# Patient Record
Sex: MALE | Race: WHITE | NOT HISPANIC OR LATINO | Employment: FULL TIME | URBAN - METROPOLITAN AREA
[De-identification: names, ages, dates, MRNs, and addresses within clinical notes are randomized per-mention and may not be internally consistent; named-entity substitution may affect disease eponyms.]

---

## 2017-02-15 ENCOUNTER — OFFICE VISIT (OUTPATIENT)
Dept: SURGERY | Facility: CLINIC | Age: 57
End: 2017-02-15
Payer: COMMERCIAL

## 2017-02-15 VITALS
BODY MASS INDEX: 30.39 KG/M2 | HEIGHT: 70 IN | WEIGHT: 212.31 LBS | HEART RATE: 65 BPM | SYSTOLIC BLOOD PRESSURE: 154 MMHG | RESPIRATION RATE: 18 BRPM | TEMPERATURE: 98 F | DIASTOLIC BLOOD PRESSURE: 89 MMHG

## 2017-02-15 DIAGNOSIS — C20 RECTAL CANCER: ICD-10-CM

## 2017-02-15 PROCEDURE — 99205 OFFICE O/P NEW HI 60 MIN: CPT | Mod: S$GLB,,, | Performed by: COLON & RECTAL SURGERY

## 2017-02-15 PROCEDURE — 99999 PR PBB SHADOW E&M-NEW PATIENT-LVL III: CPT | Mod: PBBFAC,,, | Performed by: COLON & RECTAL SURGERY

## 2017-02-15 RX ORDER — PRAVASTATIN SODIUM 80 MG/1
1 TABLET ORAL DAILY
COMMUNITY
Start: 2017-02-13

## 2017-02-15 RX ORDER — LEVOTHYROXINE SODIUM 50 UG/1
1 TABLET ORAL DAILY
COMMUNITY
Start: 2017-02-13

## 2017-02-15 NOTE — PROGRESS NOTES
Patient ID:  Murphy Angel is a 56 y.o. 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 Neg scans has seen XRT in [facility].   CT and MRI scheduled for tommorrow. Saw Dr Camacho who recommended APR. Here for second opinion.     ROS:        Constitutional: No fever, chills, activity or appetite change.      HENT: No hearing loss, facial swelling, neck pain or stiffness.       Eyes: No discharge, itching and visual disturbance.      Respiratory: No apnea, cough, choking or shortness of breath.       Cardiovascular: No leg swelling or chest pain      Gastrointestinal: No abdominal distention or change in bowel habbits     Genitourinary: No dysuria, frequency or flank pain.      Musculoskeletal: No arthralgias or gait problem.      Neurological: No dizziness, seizures or weakness.      Hematological: No adenopathy.      Psychiatric/Behavioral: No hallucinations or behavioral problems.       PE:    APPEARANCE: Well nourished, well developed, in no acute distress.   CHEST: Lungs clear. Normal respiratory effort.  CARDIOVASCULAR: Normal rhythm. No edema.  ABDOMEN: Soft. No tenderness or masses.  Rectum:  Normal skin, NST, 2.5 cm mass left posterior, not fixed distal edge 2 cm above puborectalis    Musculoskeletal: Symmetric, normal range of motion and strength.   Neurological: Alert and oriented to person, place, and time. Normal reflexes.   Skin: Skin is warm and dry.   Psychiatric: Normal mood and affect. Behavior is normal and appropriate.     Impression: Low rectal cancer  PLAN: Agree with neoadjuvant Chemoradiotherapy. Should be a canidate for sphincter preservation surgery (coloanal pullthrough with loop ileostomy) 6-10 weeks after Chemo XRT. Have patient return to clinic in 6 weeks.

## 2017-03-29 ENCOUNTER — OFFICE VISIT (OUTPATIENT)
Dept: SURGERY | Facility: CLINIC | Age: 57
End: 2017-03-29
Payer: COMMERCIAL

## 2017-03-29 VITALS
BODY MASS INDEX: 30.49 KG/M2 | DIASTOLIC BLOOD PRESSURE: 101 MMHG | HEART RATE: 72 BPM | SYSTOLIC BLOOD PRESSURE: 137 MMHG | WEIGHT: 212.5 LBS

## 2017-03-29 DIAGNOSIS — C20 RECTAL CANCER: Primary | ICD-10-CM

## 2017-03-29 PROCEDURE — 99999 PR PBB SHADOW E&M-EST. PATIENT-LVL III: CPT | Mod: PBBFAC,,, | Performed by: COLON & RECTAL SURGERY

## 2017-03-29 PROCEDURE — 99215 OFFICE O/P EST HI 40 MIN: CPT | Mod: S$GLB,,, | Performed by: COLON & RECTAL SURGERY

## 2017-03-29 PROCEDURE — 1160F RVW MEDS BY RX/DR IN RCRD: CPT | Mod: S$GLB,,, | Performed by: COLON & RECTAL SURGERY

## 2017-03-29 RX ORDER — METRONIDAZOLE 500 MG/100ML
500 INJECTION, SOLUTION INTRAVENOUS
Status: CANCELLED | OUTPATIENT
Start: 2017-03-29

## 2017-03-29 RX ORDER — CAPECITABINE 500 MG/1
TABLET, FILM COATED ORAL
COMMUNITY
Start: 2017-03-20 | End: 2018-04-18

## 2017-03-29 RX ORDER — ACETAMINOPHEN 10 MG/ML
1000 INJECTION, SOLUTION INTRAVENOUS
Status: CANCELLED | OUTPATIENT
Start: 2017-03-29 | End: 2017-03-29

## 2017-03-29 RX ORDER — HYDROCODONE BITARTRATE AND ACETAMINOPHEN 7.5; 325 MG/1; MG/1
1 TABLET ORAL
COMMUNITY
Start: 2017-03-13 | End: 2017-04-12

## 2017-03-29 RX ORDER — SODIUM CHLORIDE 9 MG/ML
INJECTION, SOLUTION INTRAVENOUS CONTINUOUS
Status: CANCELLED | OUTPATIENT
Start: 2017-03-29

## 2017-03-29 NOTE — PROGRESS NOTES
Patient ID:  Murphy Angel is a 56 y.o. 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 Neg scans has seen XRT in [facility].   CT 2/21/17 No evidence of mets mass not seen. MRI scheduled. Saw Dr Camacho who recommended APR. Came to [facility] for second opinion Feb 15, 2017.     ROS:        Constitutional: No fever, chills, activity or appetite change.      HENT: No hearing loss, facial swelling, neck pain or stiffness.       Eyes: No discharge, itching and visual disturbance.      Respiratory: No apnea, cough, choking or shortness of breath.       Cardiovascular: No leg swelling or chest pain      Gastrointestinal: No abdominal distention or change in bowel habbits     Genitourinary: No dysuria, frequency or flank pain.      Musculoskeletal: No arthralgias or gait problem.      Neurological: No dizziness, seizures or weakness.      Hematological: No adenopathy.      Psychiatric/Behavioral: No hallucinations or behavioral problems.       PE:    APPEARANCE: Well nourished, well developed, in no acute distress.   CHEST: Lungs clear. Normal respiratory effort.  CARDIOVASCULAR: Normal rhythm. No edema.  ABDOMEN: Soft. No tenderness or masses.  Rectum:  Normal skin, NST, 2 cm mass left posterior, not fixed distal edge 2 cm above puborectalis    Musculoskeletal: Symmetric, normal range of motion and strength.   Neurological: Alert and oriented to person, place, and time. Normal reflexes.   Skin: Skin is warm and dry.   Psychiatric: Normal mood and affect. Behavior is normal and appropriate.     Impression: Low rectal cancer  PLAN:   sphincter preservation surgery (LAR vs coloanal pullthrough with loop ileostomy)  On May 15 (case req in). RTC May 10 for exam.

## 2017-05-10 ENCOUNTER — OFFICE VISIT (OUTPATIENT)
Dept: SURGERY | Facility: CLINIC | Age: 57
End: 2017-05-10
Payer: COMMERCIAL

## 2017-05-10 VITALS
BODY MASS INDEX: 30.36 KG/M2 | HEART RATE: 58 BPM | SYSTOLIC BLOOD PRESSURE: 136 MMHG | DIASTOLIC BLOOD PRESSURE: 83 MMHG | HEIGHT: 70 IN | WEIGHT: 212.06 LBS

## 2017-05-10 DIAGNOSIS — C20 RECTAL CANCER: Primary | ICD-10-CM

## 2017-05-10 PROCEDURE — 99214 OFFICE O/P EST MOD 30 MIN: CPT | Mod: S$GLB,,, | Performed by: COLON & RECTAL SURGERY

## 2017-05-10 PROCEDURE — 99999 PR PBB SHADOW E&M-EST. PATIENT-LVL III: CPT | Mod: PBBFAC,,, | Performed by: COLON & RECTAL SURGERY

## 2017-05-10 PROCEDURE — 1160F RVW MEDS BY RX/DR IN RCRD: CPT | Mod: S$GLB,,, | Performed by: COLON & RECTAL SURGERY

## 2017-05-10 RX ORDER — METRONIDAZOLE 250 MG/1
TABLET ORAL
Qty: 3 TABLET | Refills: 0 | Status: ON HOLD | OUTPATIENT
Start: 2017-05-10 | End: 2017-06-23 | Stop reason: CLARIF

## 2017-05-10 RX ORDER — ACETAMINOPHEN 10 MG/ML
1000 INJECTION, SOLUTION INTRAVENOUS
Status: CANCELLED | OUTPATIENT
Start: 2017-05-10 | End: 2017-05-10

## 2017-05-10 RX ORDER — NEOMYCIN SULFATE 500 MG/1
1000 TABLET ORAL ONCE
Qty: 6 TABLET | Refills: 0 | Status: SHIPPED | OUTPATIENT
Start: 2017-05-10 | End: 2017-05-10

## 2017-05-10 RX ORDER — SODIUM CHLORIDE 9 MG/ML
INJECTION, SOLUTION INTRAVENOUS CONTINUOUS
Status: CANCELLED | OUTPATIENT
Start: 2017-05-10

## 2017-05-10 RX ORDER — METRONIDAZOLE 500 MG/100ML
500 INJECTION, SOLUTION INTRAVENOUS
Status: CANCELLED | OUTPATIENT
Start: 2017-05-10

## 2017-05-10 NOTE — H&P
Murphy Angel is a 56 y.o. 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 Neg scans has seen XRT in [facility].   CT 2/21/17 No evidence of mets mass not seen. MRI scheduled. Saw Dr Camacho who recommended APR. Came to [facility] for second opinion Feb 15, 2017.     ROS:        Constitutional: No fever, chills, activity or appetite change.      HENT: No hearing loss, facial swelling, neck pain or stiffness.       Eyes: No discharge, itching and visual disturbance.      Respiratory: No apnea, cough, choking or shortness of breath.       Cardiovascular: No leg swelling or chest pain      Gastrointestinal: No abdominal distention or change in bowel habbits     Genitourinary: No dysuria, frequency or flank pain.      Musculoskeletal: No arthralgias or gait problem.      Neurological: No dizziness, seizures or weakness.      Hematological: No adenopathy.      Psychiatric/Behavioral: No hallucinations or behavioral problems.       PE:    APPEARANCE: Well nourished, well developed, in no acute distress.   CHEST: Lungs clear. Normal respiratory effort.  CARDIOVASCULAR: Normal rhythm. No edema.  ABDOMEN: Soft. No tenderness or masses.  Rectum:  Normal skin, NST, 2 cm mass left posterior, not fixed distal edge 2 cm above puborectalis    Musculoskeletal: Symmetric, normal range of motion and strength.   Neurological: Alert and oriented to person, place, and time. Normal reflexes.   Skin: Skin is warm and dry.   Psychiatric: Normal mood and affect. Behavior is normal and appropriate.     Impression: Low rectal cancer  PLAN:   sphincter preservation surgery (LAR vs coloanal pullthrough with loop ileostomy)  On May 15.  I have explained the procedure including indications, alternatives, expected outcomes and potential complications. The patient  appears to understand and gives informed consent. The patient is medically ready for surgery.

## 2017-05-10 NOTE — PROGRESS NOTES
Patient ID:  Murphy Angel is a 56 y.o. 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 Neg scans has seen XRT in [facility].   CT 2/21/17 No evidence of mets mass not seen. MRI scheduled. Saw Dr Camacho who recommended APR. Came to [facility] for second opinion Feb 15, 2017.     ROS:        Constitutional: No fever, chills, activity or appetite change.      HENT: No hearing loss, facial swelling, neck pain or stiffness.       Eyes: No discharge, itching and visual disturbance.      Respiratory: No apnea, cough, choking or shortness of breath.       Cardiovascular: No leg swelling or chest pain      Gastrointestinal: No abdominal distention or change in bowel habbits     Genitourinary: No dysuria, frequency or flank pain.      Musculoskeletal: No arthralgias or gait problem.      Neurological: No dizziness, seizures or weakness.      Hematological: No adenopathy.      Psychiatric/Behavioral: No hallucinations or behavioral problems.       PE:    APPEARANCE: Well nourished, well developed, in no acute distress.   CHEST: Lungs clear. Normal respiratory effort.  CARDIOVASCULAR: Normal rhythm. No edema.  ABDOMEN: Soft. No tenderness or masses.  Rectum:  Normal skin, NST, 2 cm mass left posterior, not fixed distal edge 2 cm above puborectalis    Musculoskeletal: Symmetric, normal range of motion and strength.   Neurological: Alert and oriented to person, place, and time. Normal reflexes.   Skin: Skin is warm and dry.   Psychiatric: Normal mood and affect. Behavior is normal and appropriate.     Impression: Low rectal cancer  PLAN:   sphincter preservation surgery (LAR vs coloanal pullthrough with loop ileostomy)  On May 15.  I have explained the procedure including indications, alternatives, expected outcomes and potential complications.  The patient appears to understand and gives informed consent. The patient is medically ready for surgery.

## 2017-05-13 RX ORDER — NEOMYCIN SULFATE 500 MG/1
500 TABLET ORAL
Status: ON HOLD | COMMUNITY
End: 2017-06-23 | Stop reason: CLARIF

## 2017-05-13 NOTE — PRE-PROCEDURE INSTRUCTIONS
Spoke with Patient.  NPO, medication, and pre-op instructions reviewed.  Denies previous problems with Anesthesia.   Verbalized understanding of instructions.

## 2017-05-14 ENCOUNTER — ANESTHESIA EVENT (OUTPATIENT)
Dept: SURGERY | Facility: HOSPITAL | Age: 57
DRG: 331 | End: 2017-05-14
Payer: COMMERCIAL

## 2017-05-15 ENCOUNTER — ANESTHESIA (OUTPATIENT)
Dept: SURGERY | Facility: HOSPITAL | Age: 57
DRG: 331 | End: 2017-05-15
Payer: COMMERCIAL

## 2017-05-15 ENCOUNTER — HOSPITAL ENCOUNTER (INPATIENT)
Facility: HOSPITAL | Age: 57
LOS: 6 days | Discharge: HOME HEALTH | DRG: 331 | End: 2017-05-21
Attending: COLON & RECTAL SURGERY | Admitting: COLON & RECTAL SURGERY
Payer: COMMERCIAL

## 2017-05-15 ENCOUNTER — SURGERY (OUTPATIENT)
Age: 57
End: 2017-05-15

## 2017-05-15 DIAGNOSIS — C20 RECTAL CANCER: Primary | ICD-10-CM

## 2017-05-15 LAB
ABO + RH BLD: NORMAL
BLD GP AB SCN CELLS X3 SERPL QL: NORMAL

## 2017-05-15 PROCEDURE — 27000221 HC OXYGEN, UP TO 24 HOURS

## 2017-05-15 PROCEDURE — 36000708 HC OR TIME LEV III 1ST 15 MIN: Performed by: COLON & RECTAL SURGERY

## 2017-05-15 PROCEDURE — 88309 TISSUE EXAM BY PATHOLOGIST: CPT | Mod: 26,,, | Performed by: PATHOLOGY

## 2017-05-15 PROCEDURE — 25000003 PHARM REV CODE 250: Performed by: STUDENT IN AN ORGANIZED HEALTH CARE EDUCATION/TRAINING PROGRAM

## 2017-05-15 PROCEDURE — 86900 BLOOD TYPING SEROLOGIC ABO: CPT

## 2017-05-15 PROCEDURE — 25000003 PHARM REV CODE 250: Performed by: COLON & RECTAL SURGERY

## 2017-05-15 PROCEDURE — 63600175 PHARM REV CODE 636 W HCPCS: Performed by: COLON & RECTAL SURGERY

## 2017-05-15 PROCEDURE — 63600175 PHARM REV CODE 636 W HCPCS

## 2017-05-15 PROCEDURE — 63600175 PHARM REV CODE 636 W HCPCS: Performed by: ANESTHESIOLOGY

## 2017-05-15 PROCEDURE — D9220A PRA ANESTHESIA: Mod: ,,, | Performed by: ANESTHESIOLOGY

## 2017-05-15 PROCEDURE — 63600175 PHARM REV CODE 636 W HCPCS: Performed by: STUDENT IN AN ORGANIZED HEALTH CARE EDUCATION/TRAINING PROGRAM

## 2017-05-15 PROCEDURE — 71000039 HC RECOVERY, EACH ADD'L HOUR: Performed by: COLON & RECTAL SURGERY

## 2017-05-15 PROCEDURE — 88307 TISSUE EXAM BY PATHOLOGIST: CPT | Performed by: PATHOLOGY

## 2017-05-15 PROCEDURE — 37000008 HC ANESTHESIA 1ST 15 MINUTES: Performed by: COLON & RECTAL SURGERY

## 2017-05-15 PROCEDURE — 88307 TISSUE EXAM BY PATHOLOGIST: CPT | Mod: 26,,, | Performed by: PATHOLOGY

## 2017-05-15 PROCEDURE — 86901 BLOOD TYPING SEROLOGIC RH(D): CPT

## 2017-05-15 PROCEDURE — 71000033 HC RECOVERY, INTIAL HOUR: Performed by: COLON & RECTAL SURGERY

## 2017-05-15 PROCEDURE — 44139 MOBILIZATION OF COLON: CPT | Mod: ,,, | Performed by: COLON & RECTAL SURGERY

## 2017-05-15 PROCEDURE — 25000003 PHARM REV CODE 250

## 2017-05-15 PROCEDURE — 0DTP0ZZ RESECTION OF RECTUM, OPEN APPROACH: ICD-10-PCS | Performed by: COLON & RECTAL SURGERY

## 2017-05-15 PROCEDURE — 20600001 HC STEP DOWN PRIVATE ROOM

## 2017-05-15 PROCEDURE — C1729 CATH, DRAINAGE: HCPCS | Performed by: COLON & RECTAL SURGERY

## 2017-05-15 PROCEDURE — 27201423 OPTIME MED/SURG SUP & DEVICES STERILE SUPPLY: Performed by: COLON & RECTAL SURGERY

## 2017-05-15 PROCEDURE — 0D1B0Z4 BYPASS ILEUM TO CUTANEOUS, OPEN APPROACH: ICD-10-PCS | Performed by: COLON & RECTAL SURGERY

## 2017-05-15 PROCEDURE — 44145 PARTIAL REMOVAL OF COLON: CPT | Mod: ,,, | Performed by: COLON & RECTAL SURGERY

## 2017-05-15 PROCEDURE — 44310 ILEOSTOMY/JEJUNOSTOMY: CPT | Mod: 51,,, | Performed by: COLON & RECTAL SURGERY

## 2017-05-15 PROCEDURE — 0DTN0ZZ RESECTION OF SIGMOID COLON, OPEN APPROACH: ICD-10-PCS | Performed by: COLON & RECTAL SURGERY

## 2017-05-15 PROCEDURE — 94760 N-INVAS EAR/PLS OXIMETRY 1: CPT

## 2017-05-15 PROCEDURE — 36000709 HC OR TIME LEV III EA ADD 15 MIN: Performed by: COLON & RECTAL SURGERY

## 2017-05-15 PROCEDURE — 37000009 HC ANESTHESIA EA ADD 15 MINS: Performed by: COLON & RECTAL SURGERY

## 2017-05-15 RX ORDER — LEVOTHYROXINE SODIUM 25 UG/1
TABLET ORAL
Status: COMPLETED
Start: 2017-05-15 | End: 2017-05-15

## 2017-05-15 RX ORDER — SODIUM CHLORIDE 9 MG/ML
INJECTION, SOLUTION INTRAVENOUS CONTINUOUS
Status: DISCONTINUED | OUTPATIENT
Start: 2017-05-15 | End: 2017-05-15

## 2017-05-15 RX ORDER — NALOXONE HCL 0.4 MG/ML
0.02 VIAL (ML) INJECTION
Status: DISCONTINUED | OUTPATIENT
Start: 2017-05-15 | End: 2017-05-21 | Stop reason: HOSPADM

## 2017-05-15 RX ORDER — SODIUM CHLORIDE 9 MG/ML
INJECTION, SOLUTION INTRAVENOUS CONTINUOUS
Status: DISCONTINUED | OUTPATIENT
Start: 2017-05-15 | End: 2017-05-19

## 2017-05-15 RX ORDER — MIDAZOLAM HYDROCHLORIDE 1 MG/ML
INJECTION, SOLUTION INTRAMUSCULAR; INTRAVENOUS
Status: DISCONTINUED | OUTPATIENT
Start: 2017-05-15 | End: 2017-05-15

## 2017-05-15 RX ORDER — METRONIDAZOLE 500 MG/100ML
500 INJECTION, SOLUTION INTRAVENOUS
Status: COMPLETED | OUTPATIENT
Start: 2017-05-15 | End: 2017-05-15

## 2017-05-15 RX ORDER — HYDROMORPHONE HYDROCHLORIDE 1 MG/ML
0.2 INJECTION, SOLUTION INTRAMUSCULAR; INTRAVENOUS; SUBCUTANEOUS EVERY 5 MIN PRN
Status: DISCONTINUED | OUTPATIENT
Start: 2017-05-15 | End: 2017-05-15

## 2017-05-15 RX ORDER — SODIUM CHLORIDE 0.9 % (FLUSH) 0.9 %
3 SYRINGE (ML) INJECTION EVERY 8 HOURS
Status: DISCONTINUED | OUTPATIENT
Start: 2017-05-15 | End: 2017-05-15

## 2017-05-15 RX ORDER — IBUPROFEN 200 MG
16 TABLET ORAL
Status: DISCONTINUED | OUTPATIENT
Start: 2017-05-15 | End: 2017-05-21 | Stop reason: HOSPADM

## 2017-05-15 RX ORDER — ACETAMINOPHEN 10 MG/ML
INJECTION, SOLUTION INTRAVENOUS
Status: COMPLETED
Start: 2017-05-15 | End: 2017-05-15

## 2017-05-15 RX ORDER — KETAMINE HYDROCHLORIDE 100 MG/ML
INJECTION, SOLUTION INTRAMUSCULAR; INTRAVENOUS
Status: DISCONTINUED | OUTPATIENT
Start: 2017-05-15 | End: 2017-05-15

## 2017-05-15 RX ORDER — HYDROMORPHONE HYDROCHLORIDE 1 MG/ML
0.2 INJECTION, SOLUTION INTRAMUSCULAR; INTRAVENOUS; SUBCUTANEOUS EVERY 5 MIN PRN
Status: COMPLETED | OUTPATIENT
Start: 2017-05-15 | End: 2017-05-15

## 2017-05-15 RX ORDER — ONDANSETRON 2 MG/ML
4 INJECTION INTRAMUSCULAR; INTRAVENOUS EVERY 4 HOURS PRN
Status: DISCONTINUED | OUTPATIENT
Start: 2017-05-15 | End: 2017-05-21 | Stop reason: HOSPADM

## 2017-05-15 RX ORDER — ACETAMINOPHEN 10 MG/ML
1000 INJECTION, SOLUTION INTRAVENOUS
Status: COMPLETED | OUTPATIENT
Start: 2017-05-15 | End: 2017-05-15

## 2017-05-15 RX ORDER — PROPOFOL 10 MG/ML
VIAL (ML) INTRAVENOUS
Status: DISCONTINUED | OUTPATIENT
Start: 2017-05-15 | End: 2017-05-15

## 2017-05-15 RX ORDER — GLYCOPYRROLATE 0.2 MG/ML
INJECTION INTRAMUSCULAR; INTRAVENOUS
Status: DISCONTINUED | OUTPATIENT
Start: 2017-05-15 | End: 2017-05-15

## 2017-05-15 RX ORDER — HYDROMORPHONE HYDROCHLORIDE 1 MG/ML
0.5 INJECTION, SOLUTION INTRAMUSCULAR; INTRAVENOUS; SUBCUTANEOUS EVERY 5 MIN PRN
Status: COMPLETED | OUTPATIENT
Start: 2017-05-15 | End: 2017-05-15

## 2017-05-15 RX ORDER — SODIUM CHLORIDE 0.9 % (FLUSH) 0.9 %
3 SYRINGE (ML) INJECTION
Status: DISCONTINUED | OUTPATIENT
Start: 2017-05-15 | End: 2017-05-15

## 2017-05-15 RX ORDER — DIPHENHYDRAMINE HYDROCHLORIDE 50 MG/ML
12.5 INJECTION INTRAMUSCULAR; INTRAVENOUS EVERY 4 HOURS PRN
Status: DISCONTINUED | OUTPATIENT
Start: 2017-05-15 | End: 2017-05-21 | Stop reason: HOSPADM

## 2017-05-15 RX ORDER — ACETAMINOPHEN 10 MG/ML
1000 INJECTION, SOLUTION INTRAVENOUS EVERY 8 HOURS
Status: DISPENSED | OUTPATIENT
Start: 2017-05-15 | End: 2017-05-16

## 2017-05-15 RX ORDER — PRAVASTATIN SODIUM 40 MG/1
80 TABLET ORAL DAILY
Status: DISCONTINUED | OUTPATIENT
Start: 2017-05-15 | End: 2017-05-21 | Stop reason: HOSPADM

## 2017-05-15 RX ORDER — HYDROMORPHONE HYDROCHLORIDE 2 MG/ML
INJECTION, SOLUTION INTRAMUSCULAR; INTRAVENOUS; SUBCUTANEOUS
Status: DISCONTINUED | OUTPATIENT
Start: 2017-05-15 | End: 2017-05-15

## 2017-05-15 RX ORDER — HYDROMORPHONE HYDROCHLORIDE 1 MG/ML
INJECTION, SOLUTION INTRAMUSCULAR; INTRAVENOUS; SUBCUTANEOUS
Status: COMPLETED
Start: 2017-05-15 | End: 2017-05-15

## 2017-05-15 RX ORDER — LIDOCAINE HCL/PF 100 MG/5ML
SYRINGE (ML) INTRAVENOUS
Status: DISCONTINUED | OUTPATIENT
Start: 2017-05-15 | End: 2017-05-15

## 2017-05-15 RX ORDER — PRAVASTATIN SODIUM 20 MG/1
TABLET ORAL
Status: COMPLETED
Start: 2017-05-15 | End: 2017-05-15

## 2017-05-15 RX ORDER — FENTANYL CITRATE 50 UG/ML
INJECTION, SOLUTION INTRAMUSCULAR; INTRAVENOUS
Status: DISCONTINUED | OUTPATIENT
Start: 2017-05-15 | End: 2017-05-15

## 2017-05-15 RX ORDER — HYDROMORPHONE HYDROCHLORIDE 1 MG/ML
1 INJECTION, SOLUTION INTRAMUSCULAR; INTRAVENOUS; SUBCUTANEOUS
Status: DISCONTINUED | OUTPATIENT
Start: 2017-05-15 | End: 2017-05-19

## 2017-05-15 RX ORDER — DEXAMETHASONE SODIUM PHOSPHATE 4 MG/ML
INJECTION, SOLUTION INTRA-ARTICULAR; INTRALESIONAL; INTRAMUSCULAR; INTRAVENOUS; SOFT TISSUE
Status: DISCONTINUED | OUTPATIENT
Start: 2017-05-15 | End: 2017-05-15

## 2017-05-15 RX ORDER — ENOXAPARIN SODIUM 100 MG/ML
40 INJECTION SUBCUTANEOUS EVERY 24 HOURS
Status: DISCONTINUED | OUTPATIENT
Start: 2017-05-16 | End: 2017-05-21 | Stop reason: HOSPADM

## 2017-05-15 RX ORDER — NEOSTIGMINE METHYLSULFATE 1 MG/ML
INJECTION, SOLUTION INTRAVENOUS
Status: DISCONTINUED | OUTPATIENT
Start: 2017-05-15 | End: 2017-05-15

## 2017-05-15 RX ORDER — ONDANSETRON 2 MG/ML
4 INJECTION INTRAMUSCULAR; INTRAVENOUS DAILY PRN
Status: DISCONTINUED | OUTPATIENT
Start: 2017-05-15 | End: 2017-05-15

## 2017-05-15 RX ORDER — LIDOCAINE HYDROCHLORIDE AND EPINEPHRINE 10; 10 MG/ML; UG/ML
INJECTION, SOLUTION INFILTRATION; PERINEURAL
Status: DISCONTINUED | OUTPATIENT
Start: 2017-05-15 | End: 2017-05-15 | Stop reason: HOSPADM

## 2017-05-15 RX ORDER — ONDANSETRON 2 MG/ML
INJECTION INTRAMUSCULAR; INTRAVENOUS
Status: DISCONTINUED | OUTPATIENT
Start: 2017-05-15 | End: 2017-05-15

## 2017-05-15 RX ORDER — ONDANSETRON 2 MG/ML
INJECTION INTRAMUSCULAR; INTRAVENOUS
Status: DISPENSED
Start: 2017-05-15 | End: 2017-05-15

## 2017-05-15 RX ORDER — LEVOTHYROXINE SODIUM 50 UG/1
50 TABLET ORAL DAILY
Status: DISCONTINUED | OUTPATIENT
Start: 2017-05-15 | End: 2017-05-21 | Stop reason: HOSPADM

## 2017-05-15 RX ORDER — ROCURONIUM BROMIDE 10 MG/ML
INJECTION, SOLUTION INTRAVENOUS
Status: DISCONTINUED | OUTPATIENT
Start: 2017-05-15 | End: 2017-05-15

## 2017-05-15 RX ORDER — IBUPROFEN 200 MG
24 TABLET ORAL
Status: DISCONTINUED | OUTPATIENT
Start: 2017-05-15 | End: 2017-05-21 | Stop reason: HOSPADM

## 2017-05-15 RX ORDER — HYDROMORPHONE HYDROCHLORIDE 1 MG/ML
0.5 INJECTION, SOLUTION INTRAMUSCULAR; INTRAVENOUS; SUBCUTANEOUS
Status: DISCONTINUED | OUTPATIENT
Start: 2017-05-15 | End: 2017-05-19

## 2017-05-15 RX ORDER — BUPIVACAINE HYDROCHLORIDE 2.5 MG/ML
INJECTION, SOLUTION EPIDURAL; INFILTRATION; INTRACAUDAL
Status: DISCONTINUED | OUTPATIENT
Start: 2017-05-15 | End: 2017-05-15 | Stop reason: HOSPADM

## 2017-05-15 RX ORDER — GLUCAGON 1 MG
1 KIT INJECTION
Status: DISCONTINUED | OUTPATIENT
Start: 2017-05-15 | End: 2017-05-21 | Stop reason: HOSPADM

## 2017-05-15 RX ADMIN — IBUPROFEN 800 MG: 800 INJECTION INTRAVENOUS at 12:05

## 2017-05-15 RX ADMIN — ONDANSETRON 4 MG: 2 INJECTION INTRAMUSCULAR; INTRAVENOUS at 07:05

## 2017-05-15 RX ADMIN — SODIUM CHLORIDE, SODIUM GLUCONATE, SODIUM ACETATE, POTASSIUM CHLORIDE, MAGNESIUM CHLORIDE, SODIUM PHOSPHATE, DIBASIC, AND POTASSIUM PHOSPHATE: .53; .5; .37; .037; .03; .012; .00082 INJECTION, SOLUTION INTRAVENOUS at 09:05

## 2017-05-15 RX ADMIN — ROCURONIUM BROMIDE 10 MG: 10 INJECTION, SOLUTION INTRAVENOUS at 10:05

## 2017-05-15 RX ADMIN — ROCURONIUM BROMIDE 10 MG: 10 INJECTION, SOLUTION INTRAVENOUS at 09:05

## 2017-05-15 RX ADMIN — METRONIDAZOLE 500 MG: 500 SOLUTION INTRAVENOUS at 07:05

## 2017-05-15 RX ADMIN — HYDROMORPHONE HYDROCHLORIDE 1 MG: 1 INJECTION, SOLUTION INTRAMUSCULAR; INTRAVENOUS; SUBCUTANEOUS at 09:05

## 2017-05-15 RX ADMIN — HYDROMORPHONE HYDROCHLORIDE 0.2 MG: 1 INJECTION, SOLUTION INTRAMUSCULAR; INTRAVENOUS; SUBCUTANEOUS at 12:05

## 2017-05-15 RX ADMIN — SODIUM CHLORIDE: 0.9 INJECTION, SOLUTION INTRAVENOUS at 12:05

## 2017-05-15 RX ADMIN — HYDROMORPHONE HYDROCHLORIDE 0.5 MG: 1 INJECTION, SOLUTION INTRAMUSCULAR; INTRAVENOUS; SUBCUTANEOUS at 03:05

## 2017-05-15 RX ADMIN — HYDROMORPHONE HYDROCHLORIDE 0.2 MG: 1 INJECTION, SOLUTION INTRAMUSCULAR; INTRAVENOUS; SUBCUTANEOUS at 01:05

## 2017-05-15 RX ADMIN — ACETAMINOPHEN 1000 MG: 10 INJECTION, SOLUTION INTRAVENOUS at 01:05

## 2017-05-15 RX ADMIN — KETAMINE HYDROCHLORIDE 5 MG: 100 INJECTION, SOLUTION, CONCENTRATE INTRAMUSCULAR; INTRAVENOUS at 10:05

## 2017-05-15 RX ADMIN — SODIUM CHLORIDE, SODIUM GLUCONATE, SODIUM ACETATE, POTASSIUM CHLORIDE, MAGNESIUM CHLORIDE, SODIUM PHOSPHATE, DIBASIC, AND POTASSIUM PHOSPHATE: .53; .5; .37; .037; .03; .012; .00082 INJECTION, SOLUTION INTRAVENOUS at 07:05

## 2017-05-15 RX ADMIN — ROCURONIUM BROMIDE 10 MG: 10 INJECTION, SOLUTION INTRAVENOUS at 07:05

## 2017-05-15 RX ADMIN — PRAVASTATIN SODIUM 80 MG: 40 TABLET ORAL at 12:05

## 2017-05-15 RX ADMIN — LIDOCAINE HYDROCHLORIDE AND EPINEPHRINE 20 ML: 10; 10 INJECTION, SOLUTION INFILTRATION; PERINEURAL at 08:05

## 2017-05-15 RX ADMIN — PRAVASTATIN SODIUM 80 MG: 20 TABLET ORAL at 12:05

## 2017-05-15 RX ADMIN — GLYCOPYRROLATE 0.4 MG: 0.2 INJECTION, SOLUTION INTRAMUSCULAR; INTRAVENOUS at 11:05

## 2017-05-15 RX ADMIN — IBUPROFEN 800 MG: 800 INJECTION INTRAVENOUS at 05:05

## 2017-05-15 RX ADMIN — BUPIVACAINE 20 ML: 13.3 INJECTION, SUSPENSION, LIPOSOMAL INFILTRATION at 11:05

## 2017-05-15 RX ADMIN — FENTANYL CITRATE 25 MCG: 50 INJECTION, SOLUTION INTRAMUSCULAR; INTRAVENOUS at 08:05

## 2017-05-15 RX ADMIN — MIDAZOLAM HYDROCHLORIDE 2 MG: 1 INJECTION, SOLUTION INTRAMUSCULAR; INTRAVENOUS at 07:05

## 2017-05-15 RX ADMIN — ACETAMINOPHEN 1000 MG: 10 INJECTION, SOLUTION INTRAVENOUS at 09:05

## 2017-05-15 RX ADMIN — HYDROMORPHONE HYDROCHLORIDE 1 MG: 1 INJECTION, SOLUTION INTRAMUSCULAR; INTRAVENOUS; SUBCUTANEOUS at 05:05

## 2017-05-15 RX ADMIN — HYDROMORPHONE HYDROCHLORIDE 0.4 MG: 2 INJECTION, SOLUTION INTRAMUSCULAR; INTRAVENOUS; SUBCUTANEOUS at 09:05

## 2017-05-15 RX ADMIN — ROCURONIUM BROMIDE 10 MG: 10 INJECTION, SOLUTION INTRAVENOUS at 08:05

## 2017-05-15 RX ADMIN — HYDROMORPHONE HYDROCHLORIDE 0.5 MG: 1 INJECTION, SOLUTION INTRAMUSCULAR; INTRAVENOUS; SUBCUTANEOUS at 02:05

## 2017-05-15 RX ADMIN — HYDROMORPHONE HYDROCHLORIDE 0.2 MG: 2 INJECTION, SOLUTION INTRAMUSCULAR; INTRAVENOUS; SUBCUTANEOUS at 08:05

## 2017-05-15 RX ADMIN — PROPOFOL 150 MG: 10 INJECTION, EMULSION INTRAVENOUS at 07:05

## 2017-05-15 RX ADMIN — ONDANSETRON 4 MG: 2 INJECTION INTRAMUSCULAR; INTRAVENOUS at 11:05

## 2017-05-15 RX ADMIN — ROCURONIUM BROMIDE 50 MG: 10 INJECTION, SOLUTION INTRAVENOUS at 07:05

## 2017-05-15 RX ADMIN — HYDROMORPHONE HYDROCHLORIDE 0.2 MG: 2 INJECTION, SOLUTION INTRAMUSCULAR; INTRAVENOUS; SUBCUTANEOUS at 10:05

## 2017-05-15 RX ADMIN — FENTANYL CITRATE 50 MCG: 50 INJECTION, SOLUTION INTRAMUSCULAR; INTRAVENOUS at 07:05

## 2017-05-15 RX ADMIN — KETAMINE HYDROCHLORIDE 30 MG: 100 INJECTION, SOLUTION, CONCENTRATE INTRAMUSCULAR; INTRAVENOUS at 07:05

## 2017-05-15 RX ADMIN — FENTANYL CITRATE 25 MCG: 50 INJECTION, SOLUTION INTRAMUSCULAR; INTRAVENOUS at 07:05

## 2017-05-15 RX ADMIN — ONDANSETRON 4 MG: 2 INJECTION INTRAMUSCULAR; INTRAVENOUS at 10:05

## 2017-05-15 RX ADMIN — HYDROMORPHONE HYDROCHLORIDE 0.5 MG: 1 INJECTION, SOLUTION INTRAMUSCULAR; INTRAVENOUS; SUBCUTANEOUS at 01:05

## 2017-05-15 RX ADMIN — SODIUM CHLORIDE, SODIUM GLUCONATE, SODIUM ACETATE, POTASSIUM CHLORIDE, MAGNESIUM CHLORIDE, SODIUM PHOSPHATE, DIBASIC, AND POTASSIUM PHOSPHATE: .53; .5; .37; .037; .03; .012; .00082 INJECTION, SOLUTION INTRAVENOUS at 08:05

## 2017-05-15 RX ADMIN — LEVOTHYROXINE SODIUM 50 MCG: 25 TABLET ORAL at 12:05

## 2017-05-15 RX ADMIN — DEXAMETHASONE SODIUM PHOSPHATE 8 MG: 4 INJECTION, SOLUTION INTRAMUSCULAR; INTRAVENOUS at 07:05

## 2017-05-15 RX ADMIN — KETAMINE HYDROCHLORIDE 5 MG: 100 INJECTION, SOLUTION, CONCENTRATE INTRAMUSCULAR; INTRAVENOUS at 08:05

## 2017-05-15 RX ADMIN — ACETAMINOPHEN 1000 MG: 10 INJECTION, SOLUTION INTRAVENOUS at 06:05

## 2017-05-15 RX ADMIN — LIDOCAINE HYDROCHLORIDE 100 MG: 20 INJECTION, SOLUTION INTRAVENOUS at 07:05

## 2017-05-15 RX ADMIN — NEOSTIGMINE METHYLSULFATE 5 MG: 1 INJECTION INTRAVENOUS at 11:05

## 2017-05-15 RX ADMIN — SODIUM CHLORIDE: 0.9 INJECTION, SOLUTION INTRAVENOUS at 06:05

## 2017-05-15 RX ADMIN — CEFTRIAXONE 2 G: 2 INJECTION, SOLUTION INTRAVENOUS at 07:05

## 2017-05-15 RX ADMIN — SODIUM CHLORIDE, SODIUM GLUCONATE, SODIUM ACETATE, POTASSIUM CHLORIDE, MAGNESIUM CHLORIDE, SODIUM PHOSPHATE, DIBASIC, AND POTASSIUM PHOSPHATE: .53; .5; .37; .037; .03; .012; .00082 INJECTION, SOLUTION INTRAVENOUS at 10:05

## 2017-05-15 RX ADMIN — FENTANYL CITRATE 100 MCG: 50 INJECTION, SOLUTION INTRAMUSCULAR; INTRAVENOUS at 07:05

## 2017-05-15 RX ADMIN — SODIUM CHLORIDE: 0.9 INJECTION, SOLUTION INTRAVENOUS at 07:05

## 2017-05-15 RX ADMIN — LEVOTHYROXINE SODIUM 50 MCG: 50 TABLET ORAL at 12:05

## 2017-05-15 RX ADMIN — BUPIVACAINE HYDROCHLORIDE 30 ML: 2.5 INJECTION, SOLUTION EPIDURAL; INFILTRATION; INTRACAUDAL; PERINEURAL at 11:05

## 2017-05-15 NOTE — ANESTHESIA RELEASE NOTE
"Anesthesia Release from PACU Note    Patient: Murphy Angel    Procedure(s) Performed: Procedure(s) (LRB):  RESECTION-COLON AND RECTAL-LOW ANTERIOR, coloanal pullthrough, loop ileostomy, double setup (N/A)    Anesthesia type: general    Post pain: Adequate analgesia    Post assessment: no apparent anesthetic complications, tolerated procedure well and no evidence of recall    Last Vitals:   Visit Vitals    BP (!) 151/86    Pulse 89    Temp 36.5 °C (97.7 °F) (Temporal)    Resp 10    Ht 5' 10" (1.778 m)    Wt 96.2 kg (212 lb)    SpO2 100%    BMI 30.42 kg/m2       Post vital signs: stable    Level of consciousness: awake, alert  and oriented    Nausea/Vomiting: no nausea/no vomiting    Complications: none    Airway Patency: patent    Respiratory: unassisted, spontaneous ventilation, room air    Cardiovascular: stable and blood pressure at baseline    Hydration: euvolemic  "

## 2017-05-15 NOTE — ANESTHESIA POSTPROCEDURE EVALUATION
"Anesthesia Post Evaluation    Patient: Murphy Angel    Procedure(s) Performed: Procedure(s) (LRB):  RESECTION-COLON AND RECTAL-LOW ANTERIOR, coloanal pullthrough, loop ileostomy, double setup (N/A)    Final Anesthesia Type: general  Patient location during evaluation: PACU  Patient participation: Yes- Able to Participate  Level of consciousness: awake and alert and oriented  Post-procedure vital signs: reviewed and stable  Pain management: adequate  Airway patency: patent  PONV status at discharge: No PONV  Anesthetic complications: no      Cardiovascular status: blood pressure returned to baseline and hemodynamically stable  Respiratory status: spontaneous ventilation, unassisted and room air  Hydration status: euvolemic  Follow-up not needed.        Visit Vitals    BP (!) 151/86    Pulse 89    Temp 37 °C (98.6 °F) (Temporal)    Resp 10    Ht 5' 10" (1.778 m)    Wt 96.2 kg (212 lb)    SpO2 100%    BMI 30.42 kg/m2       Pain/Conrad Score: Pain Assessment Performed: Yes (5/15/2017  2:15 PM)  Presence of Pain: complains of pain/discomfort (5/15/2017  2:15 PM)  Pain Rating Prior to Med Admin: 5 (5/15/2017  3:15 PM)  Conrad Score: 10 (5/15/2017  2:15 PM)      "

## 2017-05-15 NOTE — BRIEF OP NOTE
[facility]  Colon and Rectal Surgery  Operative Note    SUMMARY     Date of Procedure: 5/15/2017     Procedure:  RESECTION-COLON AND RECTAL-LOW ANTERIOR, coloanal pullthrough, loop ileostomy     Surgeon(s) and Role:     * Nacho Dominguez MD - Primary     * Freddy Rod MD - Fellow    Assisting Surgeon: None    Pre-Operative Diagnosis: Rectal cancer [C20]    Post-Operative Diagnosis: Post-Op Diagnosis Codes:     * Rectal cancer [C20]    Anesthesia: General, Exparel 266 mg, Marcaine 0.25% (75 mg) saline 20 cc  preperironeal injection      Technical Procedures Used: hand sewn end coloanall pullthrough, loop ileostomy  Description of the Findings of the Procedure: distal scar just above anorectal ring    Significant Surgical Tasks Conducted by the Assistant(s), if Applicable: n/a    Complications: No    Estimated Blood Loss (EBL): 300 mL           Implants: * No implants in log *    Specimens:   Specimen (12h ago through future)    Start     Ordered    05/15/17 1108  Specimen to Pathology - Surgery  Once     Comments:  1. Rectum and anal canal (permanent, no preservatives, to fridge)   2. Anal canal (permanent, in formalin, to fridge)    05/15/17 1107           Condition: Good    Disposition: PACU - hemodynamically stable.    Attestation: I was present and scrubbed for the entire procedure.

## 2017-05-15 NOTE — INTERVAL H&P NOTE
The patient has been examined and the H&P has been reviewed:    I concur with the findings and no changes have occurred since H&P was written.    Anesthesia/Surgery risks, benefits and alternative options discussed and understood by patient/family.          Active Hospital Problems    Diagnosis  POA    Rectal cancer [C20]  Yes      Resolved Hospital Problems    Diagnosis Date Resolved POA   No resolved problems to display.

## 2017-05-15 NOTE — ANESTHESIA PREPROCEDURE EVALUATION
Pre-operative evaluation for Procedure(s) (LRB):  RESECTION-COLON AND RECTAL-LOW ANTERIOR, coloanal pullthrough, loop ileostomy, double setup (N/A)    Murphy Angel is a 56 y.o. male with pmh of smoking, hypothyroidism and rectal cancer. Plan for above procedure 5/15/17.     LDA:   18G PIV    Prev airway:   None on file     Patient Active Problem List   Diagnosis    Rectal cancer       Review of patient's allergies indicates:  No Known Allergies     No current facility-administered medications on file prior to encounter.      Current Outpatient Prescriptions on File Prior to Encounter   Medication Sig Dispense Refill    levothyroxine (SYNTHROID) 50 MCG tablet Take 1 tablet by mouth once daily.      pravastatin (PRAVACHOL) 80 MG tablet Take 1 tablet by mouth once daily.      capecitabine (XELODA) 500 MG Tab          Past Surgical History:   Procedure Laterality Date    EYE SURGERY         Social History     Social History    Marital status:      Spouse name: N/A    Number of children: N/A    Years of education: N/A     Occupational History    Not on file.     Social History Main Topics    Smoking status: Current Every Day Smoker     Packs/day: 1.00     Types: Cigarettes    Smokeless tobacco: Never Used    Alcohol use No    Drug use: No    Sexual activity: Not on file     Other Topics Concern    Not on file     Social History Narrative         Vital Signs Range (Last 24H):  Temp:  [36.7 °C (98.1 °F)]   Pulse:  [63]   Resp:  [16]   BP: (127)/(77)   SpO2:  [99 %]       CBC: No results for input(s): WBC, RBC, HGB, HCT, PLT, MCV, MCH, MCHC in the last 72 hours.    CMP: No results for input(s): NA, K, CL, CO2, BUN, CREATININE, GLU, MG, PHOS, CALCIUM, ALBUMIN, PROT, ALKPHOS, ALT, AST, BILITOT in the last 72 hours.    INR  No results for input(s): INR, PROTIME, APTT in the last 72 hours.    Invalid input(s): PT    EKG:  Vent. Rate : 076 BPM     Atrial Rate : 076 BPM     P-R Int : 146 ms           QRS Dur : 098 ms      QT Int : 396 ms       P-R-T Axes : 050 061 030 degrees     QTc Int : 445 ms    Confirmed by STORAGE ONLY, NO REPORT ([id number]),  ISH JEAN ([id number])  on 3/8/2013 10:48:34 AM    Anesthesia Evaluation    I have reviewed the Patient Summary Reports.        Review of Systems  Anesthesia Hx:  No problems with previous Anesthesia  History of prior surgery of interest to airway management or planning: Denies Family Hx of Anesthesia complications.   Denies Personal Hx of Anesthesia complications.   Social:  Smoker 1 ppd   Hematology/Oncology:  Hematology Normal      Current/Recent Cancer. (Rectal Cancer)   EENT/Dental:EENT/Dental Normal   Cardiovascular:  Cardiovascular Normal     Pulmonary:  Pulmonary Normal    Renal/:  Renal/ Normal     Hepatic/GI:  Hepatic/GI Normal    Musculoskeletal:  Musculoskeletal Normal    Neurological:  Neurology Normal    Endocrine:  Endocrine Normal    Dermatological:  Skin Normal    Psych:  Psychiatric Normal           Physical Exam  General:  Well nourished    Airway/Jaw/Neck:  Airway Findings: Mouth Opening: Normal Tongue: Normal  General Airway Assessment: Adult  Mallampati: III  Improves to II with phonation.  TM Distance: Normal, at least 6 cm      Dental:  Dental Findings: In tact   Chest/Lungs:  Chest/Lungs Findings: Clear to auscultation, Normal Respiratory Rate     Heart/Vascular:  Heart Findings: Rate: Normal  Rhythm: Regular Rhythm  Sounds: Normal  Heart murmur: negative    Abdomen:  Abdomen Findings: Normal      Mental Status:  Mental Status Findings:  Cooperative, Alert and Oriented         Anesthesia Plan  Type of Anesthesia, risks & benefits discussed:  Anesthesia Type:  general  Patient's Preference:   Intra-op Monitoring Plan: standard ASA monitors  Intra-op Monitoring Plan Comments:   Post Op Pain Control Plan:   Post Op Pain Control Plan Comments:   Induction:   IV  Beta Blocker:  Patient is not currently on a Beta-Blocker (No further  documentation required).       Informed Consent: Patient understands risks and agrees with Anesthesia plan.  Questions answered. Anesthesia consent signed with patient.  ASA Score: 2     Day of Surgery Review of History & Physical: I have interviewed and examined the patient. I have reviewed the patient's H&P dated:            Ready For Surgery From Anesthesia Perspective.

## 2017-05-15 NOTE — NURSING TRANSFER
Nursing Transfer Note      5/15/2017     Transfer To: [address]    Transfer via stretcher    Transfer with iv pole    Transported by pct    Medicines sent: iv fluids    Chart send with patient: Yes    Notified: spouse    Patient reassessed at: 5/15/17

## 2017-05-15 NOTE — TRANSFER OF CARE
"Anesthesia Transfer of Care Note    Patient: Murphy Angel    Procedure(s) Performed: Procedure(s) (LRB):  RESECTION-COLON AND RECTAL-LOW ANTERIOR, coloanal pullthrough, loop ileostomy, double setup (N/A)    Patient location: PACU    Anesthesia Type: general    Transport from OR: Transported from OR on 6-10 L/min O2 by face mask with adequate spontaneous ventilation    Post pain: adequate analgesia    Post assessment: no apparent anesthetic complications and tolerated procedure well    Post vital signs: stable    Level of consciousness: awake and alert    Nausea/Vomiting: no nausea/vomiting    Complications: none          Last vitals:   Visit Vitals    BP (!) 120/58 (BP Location: Left arm, Patient Position: Lying, BP Method: Automatic)    Pulse 80    Temp 36.7 °C (98.1 °F) (Oral)    Resp 10    Ht 5' 10" (1.778 m)    Wt 96.2 kg (212 lb)    SpO2 100%    BMI 30.42 kg/m2     "

## 2017-05-15 NOTE — PLAN OF CARE
Problem: Patient Care Overview  Goal: Plan of Care Review  Outcome: Ongoing (interventions implemented as appropriate)  Plan of care reviewed with patient. Patient verbalized understanding.  Patient is AAO and VSS. Pain managed with PRN pain meds.  No complaint of nausea or vomiting. Tolerating clear liquids well.  Coates Catheter in place with good output.  Ileostomy in place to RLQ.  Ambulated from stretcher to bed. Free of falls and injury. Will continue to monitor. Siria Brennan RN

## 2017-05-16 LAB
ANION GAP SERPL CALC-SCNC: 6 MMOL/L
BASOPHILS # BLD AUTO: 0 K/UL
BASOPHILS NFR BLD: 0 %
BUN SERPL-MCNC: 12 MG/DL
CALCIUM SERPL-MCNC: 8 MG/DL
CHLORIDE SERPL-SCNC: 105 MMOL/L
CO2 SERPL-SCNC: 26 MMOL/L
CREAT SERPL-MCNC: 0.9 MG/DL
DIFFERENTIAL METHOD: ABNORMAL
EOSINOPHIL # BLD AUTO: 0 K/UL
EOSINOPHIL NFR BLD: 0 %
ERYTHROCYTE [DISTWIDTH] IN BLOOD BY AUTOMATED COUNT: 14.3 %
EST. GFR  (AFRICAN AMERICAN): >60 ML/MIN/1.73 M^2
EST. GFR  (NON AFRICAN AMERICAN): >60 ML/MIN/1.73 M^2
GLUCOSE SERPL-MCNC: 125 MG/DL
HCT VFR BLD AUTO: 31.8 %
HGB BLD-MCNC: 10.9 G/DL
LYMPHOCYTES # BLD AUTO: 0.8 K/UL
LYMPHOCYTES NFR BLD: 5.7 %
MAGNESIUM SERPL-MCNC: 2.1 MG/DL
MCH RBC QN AUTO: 32.8 PG
MCHC RBC AUTO-ENTMCNC: 34.3 %
MCV RBC AUTO: 96 FL
MONOCYTES # BLD AUTO: 1.8 K/UL
MONOCYTES NFR BLD: 12 %
NEUTROPHILS # BLD AUTO: 12 K/UL
NEUTROPHILS NFR BLD: 82 %
PHOSPHATE SERPL-MCNC: 2.9 MG/DL
PLATELET # BLD AUTO: 193 K/UL
PMV BLD AUTO: 10.1 FL
POTASSIUM SERPL-SCNC: 3.8 MMOL/L
RBC # BLD AUTO: 3.32 M/UL
SODIUM SERPL-SCNC: 137 MMOL/L
WBC # BLD AUTO: 14.63 K/UL

## 2017-05-16 PROCEDURE — 25000003 PHARM REV CODE 250: Performed by: COLON & RECTAL SURGERY

## 2017-05-16 PROCEDURE — 83735 ASSAY OF MAGNESIUM: CPT

## 2017-05-16 PROCEDURE — 84100 ASSAY OF PHOSPHORUS: CPT

## 2017-05-16 PROCEDURE — 20600001 HC STEP DOWN PRIVATE ROOM

## 2017-05-16 PROCEDURE — 36415 COLL VENOUS BLD VENIPUNCTURE: CPT

## 2017-05-16 PROCEDURE — 85025 COMPLETE CBC W/AUTO DIFF WBC: CPT

## 2017-05-16 PROCEDURE — 63600175 PHARM REV CODE 636 W HCPCS: Performed by: COLON & RECTAL SURGERY

## 2017-05-16 PROCEDURE — 80048 BASIC METABOLIC PNL TOTAL CA: CPT

## 2017-05-16 RX ADMIN — IBUPROFEN 800 MG: 800 INJECTION INTRAVENOUS at 11:05

## 2017-05-16 RX ADMIN — IBUPROFEN 800 MG: 800 INJECTION INTRAVENOUS at 05:05

## 2017-05-16 RX ADMIN — PRAVASTATIN SODIUM 80 MG: 40 TABLET ORAL at 08:05

## 2017-05-16 RX ADMIN — HYDROMORPHONE HYDROCHLORIDE 1 MG: 1 INJECTION, SOLUTION INTRAMUSCULAR; INTRAVENOUS; SUBCUTANEOUS at 09:05

## 2017-05-16 RX ADMIN — HYDROMORPHONE HYDROCHLORIDE 1 MG: 1 INJECTION, SOLUTION INTRAMUSCULAR; INTRAVENOUS; SUBCUTANEOUS at 06:05

## 2017-05-16 RX ADMIN — HYDROMORPHONE HYDROCHLORIDE 1 MG: 1 INJECTION, SOLUTION INTRAMUSCULAR; INTRAVENOUS; SUBCUTANEOUS at 11:05

## 2017-05-16 RX ADMIN — HYDROMORPHONE HYDROCHLORIDE 1 MG: 1 INJECTION, SOLUTION INTRAMUSCULAR; INTRAVENOUS; SUBCUTANEOUS at 08:05

## 2017-05-16 RX ADMIN — HYDROMORPHONE HYDROCHLORIDE 1 MG: 1 INJECTION, SOLUTION INTRAMUSCULAR; INTRAVENOUS; SUBCUTANEOUS at 03:05

## 2017-05-16 RX ADMIN — HYDROMORPHONE HYDROCHLORIDE 1 MG: 1 INJECTION, SOLUTION INTRAMUSCULAR; INTRAVENOUS; SUBCUTANEOUS at 05:05

## 2017-05-16 RX ADMIN — LEVOTHYROXINE SODIUM 50 MCG: 50 TABLET ORAL at 08:05

## 2017-05-16 RX ADMIN — DIPHENHYDRAMINE HYDROCHLORIDE 12.5 MG: 50 INJECTION, SOLUTION INTRAMUSCULAR; INTRAVENOUS at 08:05

## 2017-05-16 RX ADMIN — HYDROMORPHONE HYDROCHLORIDE 1 MG: 1 INJECTION, SOLUTION INTRAMUSCULAR; INTRAVENOUS; SUBCUTANEOUS at 01:05

## 2017-05-16 RX ADMIN — IBUPROFEN 800 MG: 800 INJECTION INTRAVENOUS at 01:05

## 2017-05-16 RX ADMIN — ENOXAPARIN SODIUM 40 MG: 100 INJECTION SUBCUTANEOUS at 05:05

## 2017-05-16 NOTE — PROGRESS NOTES
COLON RECTAL SURGERY  PROGRESS NOTE    LENGTH OF STAY: 1  POST OPERATIVE DAY: 1 Day Post-Op for Procedure(s) (LRB):  RESECTION-COLON AND RECTAL-LOW ANTERIOR, coloanal pullthrough, loop ileostomy, double setup (N/A)     Subjective     Daily HPI: NAEN, adequate pain control, no n/v, no flatus yet  Objective     Vitals:    05/16/17 0728   BP: 120/63   Pulse: 75   Resp: 16   Temp: 98.2 °F (36.8 °C)         Intake/Output Summary (Last 24 hours) at 05/16/17 1034  Last data filed at 05/16/17 0340   Gross per 24 hour   Intake          5573.08 ml   Output             2350 ml   Net          3223.08 ml       General:  male in nad  Neuro: AAO x4 MAEx4 PERRL  Chest: resps even unlabored, cap refill <2 sec  Abd: soft, non-tender, without masses or organomegaly  Wound (if present): wound margins intact and healing well.  No signs of infection.  Stoma (if present): pink and viable, no flatus     Recent Results (from the past 24 hour(s))   Basic Metabolic Panel    Collection Time: 05/16/17  4:37 AM   Result Value Ref Range    Sodium 137 136 - 145 mmol/L    Potassium 3.8 3.5 - 5.1 mmol/L    Chloride 105 95 - 110 mmol/L    CO2 26 23 - 29 mmol/L    Glucose 125 (H) 70 - 110 mg/dL    BUN, Bld 12 6 - 20 mg/dL    Creatinine 0.9 0.5 - 1.4 mg/dL    Calcium 8.0 (L) 8.7 - 10.5 mg/dL    Anion Gap 6 (L) 8 - 16 mmol/L    eGFR if African American >60.0 >60 mL/min/1.73 m^2    eGFR if non African American >60.0 >60 mL/min/1.73 m^2   CBC with Auto Differential    Collection Time: 05/16/17  4:37 AM   Result Value Ref Range    WBC 14.63 (H) 3.90 - 12.70 K/uL    RBC 3.32 (L) 4.60 - 6.20 M/uL    Hemoglobin 10.9 (L) 14.0 - 18.0 g/dL    Hematocrit 31.8 (L) 40.0 - 54.0 %    MCV 96 82 - 98 fL    MCH 32.8 (H) 27.0 - 31.0 pg    MCHC 34.3 32.0 - 36.0 %    RDW 14.3 11.5 - 14.5 %    Platelets 193 150 - 350 K/uL    MPV 10.1 9.2 - 12.9 fL    Gran # 12.0 (H) 1.8 - 7.7 K/uL    Lymph # 0.8 (L) 1.0 - 4.8 K/uL    Mono # 1.8 (H) 0.3 - 1.0 K/uL    Eos # 0.0 0.0 -  0.5 K/uL    Baso # 0.00 0.00 - 0.20 K/uL    Gran% 82.0 (H) 38.0 - 73.0 %    Lymph% 5.7 (L) 18.0 - 48.0 %    Mono% 12.0 4.0 - 15.0 %    Eosinophil% 0.0 0.0 - 8.0 %    Basophil% 0.0 0.0 - 1.9 %    Differential Method Automated    Magnesium    Collection Time: 05/16/17  4:37 AM   Result Value Ref Range    Magnesium 2.1 1.6 - 2.6 mg/dL   Phosphorus    Collection Time: 05/16/17  4:37 AM   Result Value Ref Range    Phosphorus 2.9 2.7 - 4.5 mg/dL       Assessment and Plan   PO 1 LAR with coloanal anast     Plan:  Coates to remain in for now  cld  Enc amb and IS  Await bowel function  Monitor labs

## 2017-05-16 NOTE — PLAN OF CARE
Problem: Patient Care Overview  Goal: Plan of Care Review  Outcome: Ongoing (interventions implemented as appropriate)  Plan of care reviewed with patient. Patient verbalized understanding. Patient is AAO and VSS. Pain managed with PRN pain meds. No complaint of nausea or vomiting. Tolerating clear liquids well. Coates Catheter in place with good output. Ileostomy in place to RLQ, only producing bowel sweat.  Ambulated in [place]. Free of falls and injury. Will continue to monitor. Siria Brennan RN

## 2017-05-16 NOTE — PROGRESS NOTES
Asked to see for new ileostomy   Hx of adenocarcinoma in low rectum and relates he has had radiation and chemotherapy prior to surgery.   Pt has been ambulating in the hallway with his nurse and is now sitting up in the chair. C/o frequent hiccups and pain when he coughs. Stoma red, viable and non functioning. Has some bowel sweat in the appliance.   Had lengthy discussion regarding life with an ostomy and demonstrated the [facility] ostomy appliance. Gave him a pouch to manipulate. Wants his wife to have education as well. Discussed dietary changes with an ileostomy .   Plan to have another lesson an change the appliance tomorrow.   Mario Kunz RN,CWON  [phone]

## 2017-05-16 NOTE — PLAN OF CARE
Problem: Patient Care Overview  Goal: Plan of Care Review  Outcome: Ongoing (interventions implemented as appropriate)  Reviewed plan of care with patient whom verbalized understanding. Vital signs stable overnight. No acute events or falls during shift. Patient is AAOx4. Midline clean dry and intact. Ileostomy intact and patent, putting out bowel sweat. NS infusing at 125 cc/hour. One episode of emesis, though patient denied nausea. Coates in place. Urine output adequate. Pain controlled via PRN pain medications. Call bell within reach. Bed is in lowest position. Will continue to monitor for further patient needs.

## 2017-05-16 NOTE — PLAN OF CARE
Pt deciding whether he would like HH upon d/c. Pt to speak to his wife and inform team of decision. Sw will continue to follow.      Melissa Walter LMSW [phone]

## 2017-05-16 NOTE — OP NOTE
DATE OF PROCEDURE:  05/15/2017    PREOPERATIVE DIAGNOSIS:  Rectal cancer.    POSTOPERATIVE DIAGNOSIS:  Rectal cancer.    SURGEON:  Nacho Dominguez M.D.    ASSISTING SURGEON:  Freddy Rod M.D. (RES)    ANESTHESIA:  General endotracheal anesthesia.    FINDINGS:  Scar just above the anorectal ring.    ESTIMATED BLOOD LOSS:  300 mL.    FLUIDS GIVEN:  4561 mL of fluid.    SPECIMENS:  Rectum and anal canal.    SECOND SPECIMEN:  Anal canal.    DESCRIPTION OF PROCEDURE:  The patient was identified and consent verified in the preoperative holding area.  The patient was then brought to the Operative Theater and placed in the supine position on the operative table.  General endotracheal anesthesia was administered by the Anesthesia Team.  Once adequate anesthesia was obtained, the patient was placed in the lithotomy position.  Preoperative antibiotics were given prophylactically.  Prior to induction, the patient had received 5000 units of heparin and SCDs were on and in place.  A Coates catheter was placed and distal rectal washout was performed with Betadine.  The abdomen and perineum were prepped and draped in the usual standard sterile fashion.  A critical timeout was then performed.    A generous low midline incision was created and deepened to the abdominal cavity.  The abdominal cavity was entered under direct visualization and a midline incision extended from the pubic symphysis to the mid epigastrium.  A wound protector was then placed at this incision and [place] retractor was used to gain exposure of the abdominal contents.  The small bowel and proximal transverse colon were packed into the right upper quadrant of the abdomen.  Dissection of the sigmoid colon was then performed at the white line of Toldt and carried to the splenic flexure.  The mesentery was then sequentially dissected from the retroperitoneum at Gerota's fascia.  During this dissection, we identified the left ureter and protected it throughout its  course.  Our dissection then proceeded into the pelvis wherein we scored the peritoneum overlying the left and right lateral aspect of the rectum.  At this point, a window was created at the base of the mesentery at the sacral promontory.  Using a combination of Bovie electrocautery and digital manipulation, we isolated and ligated the SOMMER with clamps and ties. Our dissection then proceeded through the mesentery to the bowel, which was sequentially dissected and ligated using clamps and ties.  The sigmoid colon was then transected at the junction of the sigmoid and descending colon using a NICKI 75 mm blue load stapler.  The splenic flexure was then fully mobilized and the omentum dissected from the distal transverse colon.  During this dissection, the IMV was ligated and  a colotomy was created with minimal spillage of enteric contents.  This was closed in two layers using 3-0 Vicryl and 3-0 Ethibond.  This area was copiously irrigated and aspirated until clear.  Hemostasis was obtained in this portion of the abdomen and after mobilization was completed, it was clear that the colon would reach down to the pubic symphysis without any undue tension.  The bowel was then packed into the upper abdomen.  While we proceeded with our rectal dissection, the right lateral aspect of the rectum was dissected down to the anterior peritoneal reflection, which was completely mobilized and the anterior aspect of the dissection was performed down to the pelvic floor.  The posterior aspect along the Waldyer's fascia was then created down to the pelvic floor.  It was noted that the patient's pelvis was narrow in nature.  At this time, we proceeded with the perineal dissection.  Using Marcaine with epinephrine, we injected the anal canal mucosa lifting it from the sphincter complex using a combination of sharp dissection and Bovie electrocautery.  The anal canal mucosa was freed to the proximal aspect of the anal canal anorectal ring.   The rectum was then transected at the pelvic floor using Jaclyn scissors.  The anal canal mucosa was further dissected from the anal canal using Bovie electrocautery and passed off the table as a specimen.  Hemostasis was obtained in the anal canal on the pelvic floor.  Our attention then returned to the abdomen where we sequentially placed sutures at the proximal resection margin, which were then pulled through the anal canal floor.  We preplaced 2-0 Vicryl sutures in the anal mucosa and sphincter complex.  These were then used to create our coloanal anastomosis.  Any gaps were then closed with 2-0 Vicryl sutures.  This completed our handsewn coloanal anastomosis.  A loop ileostomy was then fashioned by creating a trephination in the right lower quadrant at a previously marked site.  A piece of Seprafilm was used to wrap the bowel, which was prolapsed through the anterior abdominal wall.  The abdominal wall was then closed using 2-0 PDS sutures in a running fashion.  The loop ileostomy was then matured using 3-0 chromic sutures in a typical fashion and the midline wound closed with 4-0 Monocryl sutures and dressed with Dermabond.  The patient tolerated the procedure well.  The patient was awoken from anesthesia and brought to the PACU in stable condition.    DICTATED BY:  Freddy Rod M.D. (RES)      PATTI/MARVA  dd: 05/15/2017 19:35:54 (CDT)  td: 05/16/2017 00:32:25 (CDT)  Doc ID   #[id number]  Job ID #[id number]    CC:

## 2017-05-16 NOTE — PLAN OF CARE
Pt is postop day #1, AA&Ox4, resting in bed.   met with patient and obtained assessment information.  Educated pt on goals of the day, he voiced understanding.      Pt with new Ileostomy.  WOCN consulted.   educated pt on importance of starting to learn self-care of ostomy while here in hospital, patient voiced understanding and agreement.  CM discussed  home health care as a resource at time of discharge.  Pt is undecided wants to discuss with his wife.  SW consulted.    PCP: Chandan Vallejo MD    Pharmacy of Choice:   [facility], [place] - [address]  [address] [facility] [place] [address]  Phone: [phone] Fax: [phone]    Payor: [facility] / Plan: [facility] CHOICE PLUS / Product Type: Commercial /         05/16/17 0904   Discharge Assessment   Assessment Type Discharge Planning Assessment   Confirmed/corrected address and phone number on facesheet? Yes   Assessment information obtained from? Patient;Medical Record   Expected Length of Stay (days) 5   Communicated expected length of stay with patient/caregiver yes   Prior to hospitilization cognitive status: Alert/Oriented   Prior to hospitalization functional status: Independent   Current cognitive status: Alert/Oriented   Current Functional Status: Needs Assistance   Arrived From home or self-care   Lives With spouse   Able to Return to Prior Arrangements yes   Is patient able to care for self after discharge? Yes   How many people do you have in your home that can help with your care after discharge? 1   Who are your caregiver(s) and their phone number(s)? Wife, Aaliyah Soliz, [phone]   Patient's perception of discharge disposition home or selfcare   Readmission Within The Last 30 Days no previous admission in last 30 days   Patient currently receives home health services? No   Does the patient currently use HME? Yes   Equipment Currently Used at Home CPAP   Do you have any problems affording any of your  prescribed medications? No   Is the patient taking medications as prescribed? yes   Do you have any financial concerns preventing you from receiving the healthcare you need? No   Does the patient have transportation to healthcare appointments? Yes   Transportation Available family or friend will provide   On Dialysis? No   Does the patient receive services at the Coumadin Clinic? No   Discharge Plan A Home with family;Home Health   Discharge Plan B Home with family   Patient/Family In Agreement With Plan yes

## 2017-05-17 LAB
ANION GAP SERPL CALC-SCNC: 8 MMOL/L
BASOPHILS # BLD AUTO: 0.01 K/UL
BASOPHILS NFR BLD: 0.1 %
BUN SERPL-MCNC: 10 MG/DL
CALCIUM SERPL-MCNC: 8.1 MG/DL
CHLORIDE SERPL-SCNC: 106 MMOL/L
CO2 SERPL-SCNC: 25 MMOL/L
CREAT SERPL-MCNC: 0.8 MG/DL
DIFFERENTIAL METHOD: ABNORMAL
EOSINOPHIL # BLD AUTO: 0.1 K/UL
EOSINOPHIL NFR BLD: 0.6 %
ERYTHROCYTE [DISTWIDTH] IN BLOOD BY AUTOMATED COUNT: 14.1 %
EST. GFR  (AFRICAN AMERICAN): >60 ML/MIN/1.73 M^2
EST. GFR  (NON AFRICAN AMERICAN): >60 ML/MIN/1.73 M^2
GLUCOSE SERPL-MCNC: 90 MG/DL
HCT VFR BLD AUTO: 28 %
HGB BLD-MCNC: 9.8 G/DL
LYMPHOCYTES # BLD AUTO: 0.8 K/UL
LYMPHOCYTES NFR BLD: 10 %
MAGNESIUM SERPL-MCNC: 1.9 MG/DL
MCH RBC QN AUTO: 32.7 PG
MCHC RBC AUTO-ENTMCNC: 35 %
MCV RBC AUTO: 93 FL
MONOCYTES # BLD AUTO: 1.2 K/UL
MONOCYTES NFR BLD: 14.9 %
NEUTROPHILS # BLD AUTO: 6.1 K/UL
NEUTROPHILS NFR BLD: 74.3 %
PHOSPHATE SERPL-MCNC: 1.5 MG/DL
PLATELET # BLD AUTO: 157 K/UL
PMV BLD AUTO: 9.7 FL
POTASSIUM SERPL-SCNC: 3.8 MMOL/L
RBC # BLD AUTO: 3 M/UL
SODIUM SERPL-SCNC: 139 MMOL/L
WBC # BLD AUTO: 8.17 K/UL

## 2017-05-17 PROCEDURE — 20600001 HC STEP DOWN PRIVATE ROOM

## 2017-05-17 PROCEDURE — 83735 ASSAY OF MAGNESIUM: CPT

## 2017-05-17 PROCEDURE — 84100 ASSAY OF PHOSPHORUS: CPT

## 2017-05-17 PROCEDURE — 85025 COMPLETE CBC W/AUTO DIFF WBC: CPT

## 2017-05-17 PROCEDURE — 36415 COLL VENOUS BLD VENIPUNCTURE: CPT

## 2017-05-17 PROCEDURE — 80048 BASIC METABOLIC PNL TOTAL CA: CPT

## 2017-05-17 PROCEDURE — 25000003 PHARM REV CODE 250: Performed by: NURSE PRACTITIONER

## 2017-05-17 PROCEDURE — 25000003 PHARM REV CODE 250: Performed by: COLON & RECTAL SURGERY

## 2017-05-17 PROCEDURE — 63600175 PHARM REV CODE 636 W HCPCS: Performed by: COLON & RECTAL SURGERY

## 2017-05-17 RX ADMIN — LEVOTHYROXINE SODIUM 50 MCG: 50 TABLET ORAL at 10:05

## 2017-05-17 RX ADMIN — ENOXAPARIN SODIUM 40 MG: 100 INJECTION SUBCUTANEOUS at 06:05

## 2017-05-17 RX ADMIN — IBUPROFEN 800 MG: 800 INJECTION INTRAVENOUS at 05:05

## 2017-05-17 RX ADMIN — HYDROMORPHONE HYDROCHLORIDE 1 MG: 1 INJECTION, SOLUTION INTRAMUSCULAR; INTRAVENOUS; SUBCUTANEOUS at 05:05

## 2017-05-17 RX ADMIN — SODIUM CHLORIDE: 0.9 INJECTION, SOLUTION INTRAVENOUS at 06:05

## 2017-05-17 RX ADMIN — PRAVASTATIN SODIUM 80 MG: 40 TABLET ORAL at 10:05

## 2017-05-17 RX ADMIN — IBUPROFEN 800 MG: 800 INJECTION INTRAVENOUS at 12:05

## 2017-05-17 RX ADMIN — HYDROMORPHONE HYDROCHLORIDE 1 MG: 1 INJECTION, SOLUTION INTRAMUSCULAR; INTRAVENOUS; SUBCUTANEOUS at 09:05

## 2017-05-17 RX ADMIN — HYDROMORPHONE HYDROCHLORIDE 1 MG: 1 INJECTION, SOLUTION INTRAMUSCULAR; INTRAVENOUS; SUBCUTANEOUS at 03:05

## 2017-05-17 RX ADMIN — IBUPROFEN 800 MG: 800 INJECTION INTRAVENOUS at 06:05

## 2017-05-17 RX ADMIN — HYDROMORPHONE HYDROCHLORIDE 1 MG: 1 INJECTION, SOLUTION INTRAMUSCULAR; INTRAVENOUS; SUBCUTANEOUS at 12:05

## 2017-05-17 RX ADMIN — IBUPROFEN 800 MG: 800 INJECTION INTRAVENOUS at 11:05

## 2017-05-17 RX ADMIN — HYDROMORPHONE HYDROCHLORIDE 1 MG: 1 INJECTION, SOLUTION INTRAMUSCULAR; INTRAVENOUS; SUBCUTANEOUS at 10:05

## 2017-05-17 RX ADMIN — HYDROMORPHONE HYDROCHLORIDE 1 MG: 1 INJECTION, SOLUTION INTRAMUSCULAR; INTRAVENOUS; SUBCUTANEOUS at 06:05

## 2017-05-17 NOTE — PLAN OF CARE
Sw met with Pt at the bedside to discuss d/c plan. Pt still unsure of whether he would like  care. Pt would like to make decision closer to time of d/c. Pt's IZAIAH tentatively Fri/ Sat, depending on ostomy function. Sw will continue to follow.      Melissa Walter, ABRAM [id number]

## 2017-05-17 NOTE — PROGRESS NOTES
Complaining of pain this morning. Has not been OOB to walk yet.   Stoma red , viable and non functioning . Denies flatus production.   Have brought ostomy supplies and literature into the room. Discussed foods to avoid on his restricted diet.   Agreed to wait for wife to be present for pouch change lesson . Pt to call when she arrives.   Mario Conde RN,CWON  [phone]

## 2017-05-17 NOTE — PROGRESS NOTES
COLON RECTAL SURGERY  PROGRESS NOTE    LENGTH OF STAY: 2  POST OPERATIVE DAY: 2 Days Post-Op for Procedure(s) (LRB):  RESECTION-COLON AND RECTAL-LOW ANTERIOR, coloanal pullthrough, loop ileostomy, double setup (N/A)     Subjective     Daily HPI: NAEON, adequate pain control, no n/v, no flatus or succus  Objective     Vitals:    05/17/17 0723   BP: 125/68   Pulse: 82   Resp: 18   Temp: 98 °F (36.7 °C)         Intake/Output Summary (Last 24 hours) at 05/17/17 1211  Last data filed at 05/17/17 1003   Gross per 24 hour   Intake          3022.92 ml   Output             2700 ml   Net           322.92 ml       General:  male in nad  Neuro: AAO x4 MAEx4 PERRL  Chest: resps even unlabored, cap refill <2 sec  Abd: soft, non-tender, without masses or organomegaly  Wound (if present): wound margins intact and healing well.  No signs of infection.  Stoma (if present): pink and viable, no flatus     Recent Results (from the past 24 hour(s))   Basic Metabolic Panel    Collection Time: 05/17/17  4:17 AM   Result Value Ref Range    Sodium 139 136 - 145 mmol/L    Potassium 3.8 3.5 - 5.1 mmol/L    Chloride 106 95 - 110 mmol/L    CO2 25 23 - 29 mmol/L    Glucose 90 70 - 110 mg/dL    BUN, Bld 10 6 - 20 mg/dL    Creatinine 0.8 0.5 - 1.4 mg/dL    Calcium 8.1 (L) 8.7 - 10.5 mg/dL    Anion Gap 8 8 - 16 mmol/L    eGFR if African American >60.0 >60 mL/min/1.73 m^2    eGFR if non African American >60.0 >60 mL/min/1.73 m^2   CBC with Auto Differential    Collection Time: 05/17/17  4:17 AM   Result Value Ref Range    WBC 8.17 3.90 - 12.70 K/uL    RBC 3.00 (L) 4.60 - 6.20 M/uL    Hemoglobin 9.8 (L) 14.0 - 18.0 g/dL    Hematocrit 28.0 (L) 40.0 - 54.0 %    MCV 93 82 - 98 fL    MCH 32.7 (H) 27.0 - 31.0 pg    MCHC 35.0 32.0 - 36.0 %    RDW 14.1 11.5 - 14.5 %    Platelets 157 150 - 350 K/uL    MPV 9.7 9.2 - 12.9 fL    Gran # 6.1 1.8 - 7.7 K/uL    Lymph # 0.8 (L) 1.0 - 4.8 K/uL    Mono # 1.2 (H) 0.3 - 1.0 K/uL    Eos # 0.1 0.0 - 0.5 K/uL     Baso # 0.01 0.00 - 0.20 K/uL    Gran% 74.3 (H) 38.0 - 73.0 %    Lymph% 10.0 (L) 18.0 - 48.0 %    Mono% 14.9 4.0 - 15.0 %    Eosinophil% 0.6 0.0 - 8.0 %    Basophil% 0.1 0.0 - 1.9 %    Differential Method Automated    Magnesium    Collection Time: 05/17/17  4:17 AM   Result Value Ref Range    Magnesium 1.9 1.6 - 2.6 mg/dL   Phosphorus    Collection Time: 05/17/17  4:17 AM   Result Value Ref Range    Phosphorus 1.5 (L) 2.7 - 4.5 mg/dL       Assessment and Plan   PO 2 LAR with coloanal    Plan:  Coates d/c  Adv diet  Enc amb and IS  Monitor labs

## 2017-05-18 LAB
ANION GAP SERPL CALC-SCNC: 6 MMOL/L
BASOPHILS # BLD AUTO: 0.01 K/UL
BASOPHILS NFR BLD: 0.2 %
BUN SERPL-MCNC: 8 MG/DL
CALCIUM SERPL-MCNC: 8.3 MG/DL
CHLORIDE SERPL-SCNC: 106 MMOL/L
CO2 SERPL-SCNC: 26 MMOL/L
CREAT SERPL-MCNC: 0.7 MG/DL
DIFFERENTIAL METHOD: ABNORMAL
EOSINOPHIL # BLD AUTO: 0.2 K/UL
EOSINOPHIL NFR BLD: 2.5 %
ERYTHROCYTE [DISTWIDTH] IN BLOOD BY AUTOMATED COUNT: 13.7 %
EST. GFR  (AFRICAN AMERICAN): >60 ML/MIN/1.73 M^2
EST. GFR  (NON AFRICAN AMERICAN): >60 ML/MIN/1.73 M^2
GLUCOSE SERPL-MCNC: 107 MG/DL
HCT VFR BLD AUTO: 30.6 %
HGB BLD-MCNC: 10.4 G/DL
LYMPHOCYTES # BLD AUTO: 0.7 K/UL
LYMPHOCYTES NFR BLD: 11.1 %
MAGNESIUM SERPL-MCNC: 1.9 MG/DL
MCH RBC QN AUTO: 32.3 PG
MCHC RBC AUTO-ENTMCNC: 34 %
MCV RBC AUTO: 95 FL
MONOCYTES # BLD AUTO: 0.7 K/UL
MONOCYTES NFR BLD: 11.5 %
NEUTROPHILS # BLD AUTO: 4.8 K/UL
NEUTROPHILS NFR BLD: 74.4 %
PHOSPHATE SERPL-MCNC: 2 MG/DL
PLATELET # BLD AUTO: 178 K/UL
PMV BLD AUTO: 9.9 FL
POTASSIUM SERPL-SCNC: 3.6 MMOL/L
RBC # BLD AUTO: 3.22 M/UL
SODIUM SERPL-SCNC: 138 MMOL/L
WBC # BLD AUTO: 6.41 K/UL

## 2017-05-18 PROCEDURE — 20600001 HC STEP DOWN PRIVATE ROOM

## 2017-05-18 PROCEDURE — 36415 COLL VENOUS BLD VENIPUNCTURE: CPT

## 2017-05-18 PROCEDURE — 63600175 PHARM REV CODE 636 W HCPCS: Performed by: COLON & RECTAL SURGERY

## 2017-05-18 PROCEDURE — 85025 COMPLETE CBC W/AUTO DIFF WBC: CPT

## 2017-05-18 PROCEDURE — 84100 ASSAY OF PHOSPHORUS: CPT

## 2017-05-18 PROCEDURE — 25000003 PHARM REV CODE 250: Performed by: NURSE PRACTITIONER

## 2017-05-18 PROCEDURE — 80048 BASIC METABOLIC PNL TOTAL CA: CPT

## 2017-05-18 PROCEDURE — 83735 ASSAY OF MAGNESIUM: CPT

## 2017-05-18 PROCEDURE — 25000003 PHARM REV CODE 250: Performed by: COLON & RECTAL SURGERY

## 2017-05-18 RX ADMIN — HYDROMORPHONE HYDROCHLORIDE 1 MG: 1 INJECTION, SOLUTION INTRAMUSCULAR; INTRAVENOUS; SUBCUTANEOUS at 04:05

## 2017-05-18 RX ADMIN — LEVOTHYROXINE SODIUM 50 MCG: 50 TABLET ORAL at 09:05

## 2017-05-18 RX ADMIN — IBUPROFEN 800 MG: 800 INJECTION INTRAVENOUS at 12:05

## 2017-05-18 RX ADMIN — IBUPROFEN 800 MG: 800 INJECTION INTRAVENOUS at 05:05

## 2017-05-18 RX ADMIN — SODIUM CHLORIDE: 0.9 INJECTION, SOLUTION INTRAVENOUS at 09:05

## 2017-05-18 RX ADMIN — HYDROMORPHONE HYDROCHLORIDE 1 MG: 1 INJECTION, SOLUTION INTRAMUSCULAR; INTRAVENOUS; SUBCUTANEOUS at 12:05

## 2017-05-18 RX ADMIN — ENOXAPARIN SODIUM 40 MG: 100 INJECTION SUBCUTANEOUS at 04:05

## 2017-05-18 RX ADMIN — HYDROMORPHONE HYDROCHLORIDE 1 MG: 1 INJECTION, SOLUTION INTRAMUSCULAR; INTRAVENOUS; SUBCUTANEOUS at 09:05

## 2017-05-18 RX ADMIN — HYDROMORPHONE HYDROCHLORIDE 1 MG: 1 INJECTION, SOLUTION INTRAMUSCULAR; INTRAVENOUS; SUBCUTANEOUS at 10:05

## 2017-05-18 RX ADMIN — PRAVASTATIN SODIUM 80 MG: 40 TABLET ORAL at 09:05

## 2017-05-18 RX ADMIN — HYDROMORPHONE HYDROCHLORIDE 1 MG: 1 INJECTION, SOLUTION INTRAMUSCULAR; INTRAVENOUS; SUBCUTANEOUS at 07:05

## 2017-05-18 RX ADMIN — SODIUM CHLORIDE: 0.9 INJECTION, SOLUTION INTRAVENOUS at 10:05

## 2017-05-18 NOTE — PLAN OF CARE
Sw met with Pt at the bedside. Sw discussed d/c plan. Pt requested HH care, has no preference of agency. Sw searched in network agencies in Pt's area. Sw sent referral to [facility], as many ostomy Pts have reported positive experiences with their care. Sw sent referral via [facility]. Sw contacted Radha at [facility], informed her of IZAIAH and needs.     [facility] cannot accept Pt until they have received cosigned orders. Covering Sw will need to send cosigned orders prior to d/c, as no orders have been written yet (as Pt is not expected to d/c until Sunday).     Melissa Walter, ABRAM [id number]

## 2017-05-18 NOTE — PLAN OF CARE
Problem: Patient Care Overview  Goal: Plan of Care Review  IV pain medication administered as ordered.     Problem: Fall Risk (Adult)  Goal: Identify Related Risk Factors and Signs and Symptoms  Related risk factors and signs and symptoms are identified upon initiation of Human Response Clinical Practice Guideline (CPG)   No falls during this shift.

## 2017-05-18 NOTE — PROGRESS NOTES
Pt related his wife will be unable to get a ride to the hospital until in the evenings .  Discussed with care management as we were concerned he may need additional assistance with his new ostomy at home as wife has not been part of the ostomy lessons. Plan is for him to receive home health services upon discharge.     05/18/17 1100       Ileostomy 05/15/17 1111 RLQ   Placement Date/Time: 05/15/17 1111   Inserted by: MD  Location: RLQ   Stoma Appearance rosebud appearance;red;swollen   Stoma Size (in) 62v20ln oval   Appliance 1-piece;no leakage;changed   Accessories/Skin Care skin barrier paste;cleansed w/ sterile normal saline   Treatment Bag change;Site care   Stoma Function brown;thin liquid   Peristomal Assessment Dry;Clean;Intact   Tolerance did not assist w/ stoma care  (cut wafer and observed application)     Pt observed entire pouch change procedure and assisted by cutting out some of the wafer. States he feels changing the pouch is something he can do with practice. Would like for him to change the appliance himself with wound/ ostomy nurse observing.  Mario Conde RN,CWON  [id number]

## 2017-05-18 NOTE — PROGRESS NOTES
COLON RECTAL SURGERY  PROGRESS NOTE    LENGTH OF STAY: 3  POST OPERATIVE DAY: 3 Days Post-Op for Procedure(s) (LRB):  RESECTION-COLON AND RECTAL-LOW ANTERIOR, coloanal pullthrough, loop ileostomy, double setup (N/A)     Subjective     Daily HPI: NAEON, adequate pain control, no n/v, positive for flatus and succus  Objective     Vitals:    05/18/17 0740   BP: (!) 155/86   Pulse: 78   Resp: 16   Temp: 97.8 °F (36.6 °C)         Intake/Output Summary (Last 24 hours) at 05/18/17 1134  Last data filed at 05/18/17 0741   Gross per 24 hour   Intake             2607 ml   Output             2150 ml   Net              457 ml       General:  male in nad  Neuro: AAO x4 MAEx4 PERRL  Chest: resps even unlabored, cap refill <2 sec  Abd: soft, non-tender, without masses or organomegaly  Wound (if present): wound margins intact and healing well.  No signs of infection.distention improved  Stoma (if present): pink and viable, positive for flatus     Recent Results (from the past 24 hour(s))   Basic Metabolic Panel    Collection Time: 05/18/17  5:15 AM   Result Value Ref Range    Sodium 138 136 - 145 mmol/L    Potassium 3.6 3.5 - 5.1 mmol/L    Chloride 106 95 - 110 mmol/L    CO2 26 23 - 29 mmol/L    Glucose 107 70 - 110 mg/dL    BUN, Bld 8 6 - 20 mg/dL    Creatinine 0.7 0.5 - 1.4 mg/dL    Calcium 8.3 (L) 8.7 - 10.5 mg/dL    Anion Gap 6 (L) 8 - 16 mmol/L    eGFR if African American >60.0 >60 mL/min/1.73 m^2    eGFR if non African American >60.0 >60 mL/min/1.73 m^2   CBC with Auto Differential    Collection Time: 05/18/17  5:15 AM   Result Value Ref Range    WBC 6.41 3.90 - 12.70 K/uL    RBC 3.22 (L) 4.60 - 6.20 M/uL    Hemoglobin 10.4 (L) 14.0 - 18.0 g/dL    Hematocrit 30.6 (L) 40.0 - 54.0 %    MCV 95 82 - 98 fL    MCH 32.3 (H) 27.0 - 31.0 pg    MCHC 34.0 32.0 - 36.0 %    RDW 13.7 11.5 - 14.5 %    Platelets 178 150 - 350 K/uL    MPV 9.9 9.2 - 12.9 fL    Gran # 4.8 1.8 - 7.7 K/uL    Lymph # 0.7 (L) 1.0 - 4.8 K/uL    Mono # 0.7  0.3 - 1.0 K/uL    Eos # 0.2 0.0 - 0.5 K/uL    Baso # 0.01 0.00 - 0.20 K/uL    Gran% 74.4 (H) 38.0 - 73.0 %    Lymph% 11.1 (L) 18.0 - 48.0 %    Mono% 11.5 4.0 - 15.0 %    Eosinophil% 2.5 0.0 - 8.0 %    Basophil% 0.2 0.0 - 1.9 %    Differential Method Automated    Magnesium    Collection Time: 05/18/17  5:15 AM   Result Value Ref Range    Magnesium 1.9 1.6 - 2.6 mg/dL   Phosphorus    Collection Time: 05/18/17  5:15 AM   Result Value Ref Range    Phosphorus 2.0 (L) 2.7 - 4.5 mg/dL       Assessment and Plan   PO 2 LAR with coloanal    Plan:  Continue low fiber diet  Consult wound care for ostomy education  Enc amb and IS  Monitor labs   Discharge planning

## 2017-05-18 NOTE — PLAN OF CARE
Problem: Patient Care Overview  Goal: Plan of Care Review  Pt voided during this shift.     Problem: Fall Risk (Adult)  Goal: Identify Related Risk Factors and Signs and Symptoms  Related risk factors and signs and symptoms are identified upon initiation of Human Response Clinical Practice Guideline (CPG)   No falls during this shift.

## 2017-05-18 NOTE — PLAN OF CARE
Problem: Patient Care Overview  Goal: Plan of Care Review  Outcome: Ongoing (interventions implemented as appropriate)  POC reviewed with pt and spouse, both verbalized understanding. VSS. Pt AAOx3. Pt c\o pain during shift,  relieved with prescribed meds. Pt denies nausea during shift.  Pt tolerating regular diet. Pt moving independently in bed.  Pt remains free of falls, injuries and trauma during shift. No distress noted at this time, will continue to monitor.

## 2017-05-19 LAB
ANION GAP SERPL CALC-SCNC: 11 MMOL/L
BASOPHILS # BLD AUTO: 0.01 K/UL
BASOPHILS NFR BLD: 0.2 %
BUN SERPL-MCNC: 6 MG/DL
CALCIUM SERPL-MCNC: 8.5 MG/DL
CHLORIDE SERPL-SCNC: 106 MMOL/L
CO2 SERPL-SCNC: 22 MMOL/L
CREAT SERPL-MCNC: 0.8 MG/DL
DIFFERENTIAL METHOD: ABNORMAL
EOSINOPHIL # BLD AUTO: 0.3 K/UL
EOSINOPHIL NFR BLD: 4.5 %
ERYTHROCYTE [DISTWIDTH] IN BLOOD BY AUTOMATED COUNT: 13.5 %
EST. GFR  (AFRICAN AMERICAN): >60 ML/MIN/1.73 M^2
EST. GFR  (NON AFRICAN AMERICAN): >60 ML/MIN/1.73 M^2
GLUCOSE SERPL-MCNC: 90 MG/DL
HCT VFR BLD AUTO: 31.3 %
HGB BLD-MCNC: 10.5 G/DL
LYMPHOCYTES # BLD AUTO: 0.9 K/UL
LYMPHOCYTES NFR BLD: 14.2 %
MAGNESIUM SERPL-MCNC: 1.8 MG/DL
MCH RBC QN AUTO: 31.6 PG
MCHC RBC AUTO-ENTMCNC: 33.5 %
MCV RBC AUTO: 94 FL
MONOCYTES # BLD AUTO: 0.8 K/UL
MONOCYTES NFR BLD: 12.7 %
NEUTROPHILS # BLD AUTO: 4.1 K/UL
NEUTROPHILS NFR BLD: 68.2 %
PHOSPHATE SERPL-MCNC: 2.7 MG/DL
PLATELET # BLD AUTO: 206 K/UL
PMV BLD AUTO: 9.5 FL
POTASSIUM SERPL-SCNC: 3.6 MMOL/L
RBC # BLD AUTO: 3.32 M/UL
SODIUM SERPL-SCNC: 139 MMOL/L
WBC # BLD AUTO: 6 K/UL

## 2017-05-19 PROCEDURE — 25000003 PHARM REV CODE 250: Performed by: NURSE PRACTITIONER

## 2017-05-19 PROCEDURE — 80048 BASIC METABOLIC PNL TOTAL CA: CPT

## 2017-05-19 PROCEDURE — 36415 COLL VENOUS BLD VENIPUNCTURE: CPT

## 2017-05-19 PROCEDURE — 83735 ASSAY OF MAGNESIUM: CPT

## 2017-05-19 PROCEDURE — 20600001 HC STEP DOWN PRIVATE ROOM

## 2017-05-19 PROCEDURE — 84100 ASSAY OF PHOSPHORUS: CPT

## 2017-05-19 PROCEDURE — 85025 COMPLETE CBC W/AUTO DIFF WBC: CPT

## 2017-05-19 PROCEDURE — 63600175 PHARM REV CODE 636 W HCPCS: Performed by: COLON & RECTAL SURGERY

## 2017-05-19 PROCEDURE — 25000003 PHARM REV CODE 250: Performed by: COLON & RECTAL SURGERY

## 2017-05-19 RX ORDER — OXYCODONE HYDROCHLORIDE 5 MG/1
5 TABLET ORAL
Status: DISCONTINUED | OUTPATIENT
Start: 2017-05-19 | End: 2017-05-21 | Stop reason: HOSPADM

## 2017-05-19 RX ORDER — HYDROMORPHONE HYDROCHLORIDE 1 MG/ML
0.5 INJECTION, SOLUTION INTRAMUSCULAR; INTRAVENOUS; SUBCUTANEOUS EVERY 6 HOURS PRN
Status: DISCONTINUED | OUTPATIENT
Start: 2017-05-19 | End: 2017-05-21 | Stop reason: HOSPADM

## 2017-05-19 RX ORDER — OXYCODONE HYDROCHLORIDE 5 MG/1
10 TABLET ORAL
Status: DISCONTINUED | OUTPATIENT
Start: 2017-05-19 | End: 2017-05-21 | Stop reason: HOSPADM

## 2017-05-19 RX ADMIN — IBUPROFEN 800 MG: 800 INJECTION INTRAVENOUS at 11:05

## 2017-05-19 RX ADMIN — HYDROMORPHONE HYDROCHLORIDE 1 MG: 1 INJECTION, SOLUTION INTRAMUSCULAR; INTRAVENOUS; SUBCUTANEOUS at 04:05

## 2017-05-19 RX ADMIN — HYDROMORPHONE HYDROCHLORIDE 1 MG: 1 INJECTION, SOLUTION INTRAMUSCULAR; INTRAVENOUS; SUBCUTANEOUS at 08:05

## 2017-05-19 RX ADMIN — LEVOTHYROXINE SODIUM 50 MCG: 50 TABLET ORAL at 08:05

## 2017-05-19 RX ADMIN — HYDROMORPHONE HYDROCHLORIDE 0.5 MG: 1 INJECTION, SOLUTION INTRAMUSCULAR; INTRAVENOUS; SUBCUTANEOUS at 02:05

## 2017-05-19 RX ADMIN — IBUPROFEN 800 MG: 800 INJECTION INTRAVENOUS at 06:05

## 2017-05-19 RX ADMIN — ENOXAPARIN SODIUM 40 MG: 100 INJECTION SUBCUTANEOUS at 05:05

## 2017-05-19 RX ADMIN — IBUPROFEN 800 MG: 800 INJECTION INTRAVENOUS at 05:05

## 2017-05-19 RX ADMIN — OXYCODONE HYDROCHLORIDE 10 MG: 5 TABLET ORAL at 05:05

## 2017-05-19 RX ADMIN — IBUPROFEN 800 MG: 800 INJECTION INTRAVENOUS at 12:05

## 2017-05-19 RX ADMIN — OXYCODONE HYDROCHLORIDE 10 MG: 5 TABLET ORAL at 10:05

## 2017-05-19 RX ADMIN — PRAVASTATIN SODIUM 80 MG: 40 TABLET ORAL at 08:05

## 2017-05-19 NOTE — PLAN OF CARE
Problem: Patient Care Overview  Goal: Plan of Care Review  Outcome: Ongoing (interventions implemented as appropriate)  POC reviewed with pt. Who verbalized understanding. AAOx 4. Remains free of falls and injuries. VSS. Tolerating low fiber/residue diet, denies nausea. Complains of pain treated with PRN meds. Independent activity. ML-DB. Ileostomy. No acute events. No distress noted. WCTM.

## 2017-05-19 NOTE — PROGRESS NOTES
Wife was present for pouch change .  Had lengthy lesson on pouch change procedure. Pt familiar with steps and could provide step by step instruction. Wife participated by cutting out wafer , applying the stoma paste and applying the pouch to the stoma. Demonstrated the pouch closure. Pt has been enrolled in the [facility] cares program to asst in the transition to home and supply needs.      05/19/17 1000   RASS (Mccray Agitation-Sedation Scale)   RASS Patient Score 0-->alert and calm       Ileostomy 05/15/17 1111 RLQ   Placement Date/Time: 05/15/17 1111   Inserted by: MD  Location: RLQ   Stoma Appearance rosebud appearance;pink;moist   Stoma Size (in) 40mm x 35 mm oval   Appliance 1-piece;no leakage   Accessories/Skin Care skin barrier paste;cleansed w/ sterile normal saline   Treatment Bag change   Stoma Function green;thin liquid   Peristomal Assessment Clean;Intact   Tolerance did not assist w/ stoma care;independent w/ stoma care  (wife taught pouch change procedure and she applied the pouch)   Safety Interventions   Patient Rounds bed in low position;bed wheels locked;call light in reach;clutter free environment maintained;ID band on;placement of personal items at bedside;toileting offered;visualized patient   Safety Promotion/Fall Prevention side rails raised x 2;nonskid shoes/socks when out of bed;medications reviewed;lighting adjusted;Fall Risk reviewed with patient/family;commode/urinal/bedpan at bedside;assistive device/personal item within reach   Daily Care   Activity Type up ad rose   Positioning   Body Position positioned/repositioned independently     Mario Conde RN,CWON  [id number]

## 2017-05-19 NOTE — PLAN OF CARE
Problem: Patient Care Overview  Goal: Plan of Care Review  IV fluids d/c'd    Problem: Fall Risk (Adult)  Goal: Identify Related Risk Factors and Signs and Symptoms  Related risk factors and signs and symptoms are identified upon initiation of Human Response Clinical Practice Guideline (CPG)   No falls during this shift.

## 2017-05-19 NOTE — PLAN OF CARE
[facility]    HOME HEALTH ORDERS  FACE TO FACE ENCOUNTER    Patient Name: Murphy Angel  YOB: 1960    PCP: Chandan Vallejo MD   PCP Address: [address] [place], [place] [address] [address]  PCP Phone Number: [phone]  PCP Fax: [phone]    Encounter Date: 05/19/2017    Admit to Home Health    Diagnoses:  Active Hospital Problems    Diagnosis  POA    *Rectal cancer [C20]  Yes      Resolved Hospital Problems    Diagnosis Date Resolved POA   No resolved problems to display.       Future Appointments  Date Time Provider Department Center   5/31/2017 9:15 AM Nacho Dominguez MD [facility] [address]   5/31/2017 9:30 AM JOSE Grant [facility] [address]     Follow-up Information     Follow up with [facility].    Specialties:  DME Provider, Home Health Services    Why:  home health    Contact information:    [address] [facility] [id number] [phone]              I have seen and examined this patient face to face today. My clinical findings that support the need for the home health skilled services and home bound status are the following:  Weakness/numbness causing balance and gait disturbance due to Surgery making it taxing to leave home.    Allergies:Review of patient's allergies indicates:  No Known Allergies    Diet: regular diet    Activities: no lifting, Driving, or Strenuous exercise for 6 weeks    Nursing:   SN to complete comprehensive assessment including routine vital signs. Instruct on disease process and s/s of complications to report to MD. Review/verify medication list sent home with the patient at time of discharge  and instruct patient/caregiver as needed. Frequency may be adjusted depending on start of care date.    Notify MD if SBP > 160 or < 90; DBP > 90 or < 50; HR > 120 or < 50; Temp > 101; Other:         CONSULTS:    Physical Therapy to evaluate and treat. Evaluate for home safety and equipment needs; Establish/upgrade home exercise  program. Perform / instruct on therapeutic exercises, gait training, transfer training, and Range of Motion.  Occupational Therapy to evaluate and treat. Evaluate home environment for safety and equipment needs. Perform/Instruct on transfers, ADL training, ROM, and therapeutic exercises.   to evaluate for community resources/long-range planning.    MISCELLANEOUS CARE:  Colostomy Care:  Instruct patient/caregiver to empty bag when full and PRN., Change and clean site every 48 hours and Monitor skin integrity.    WOUND CARE ORDERS  yes:  Surgical Wound:  Location:   abd    Consult ET nurse        Apply the following to wound: wound cleanse and leave open to air,          Medications: Review discharge medications with patient and family and provide education.      Current Discharge Medication List      CONTINUE these medications which have NOT CHANGED    Details   levothyroxine (SYNTHROID) 50 MCG tablet Take 1 tablet by mouth once daily.      metronidazole (FLAGYL) 250 MG tablet Take tablet at 1300, 1400, and 2300 day prior to surgery  Qty: 3 tablet, Refills: 0      neomycin (MYCIFRADIN) 500 mg Tab Take 500 mg by mouth.      pravastatin (PRAVACHOL) 80 MG tablet Take 1 tablet by mouth once daily.      capecitabine (XELODA) 500 MG Tab              I certify that this patient is confined to his home and needs intermittent skilled nursing care, physical therapy and occupational therapy.

## 2017-05-19 NOTE — PROGRESS NOTES
COLON RECTAL SURGERY  PROGRESS NOTE    LENGTH OF STAY: 4  POST OPERATIVE DAY: 4 Days Post-Op for Procedure(s) (LRB):  RESECTION-COLON AND RECTAL-LOW ANTERIOR, coloanal pullthrough, loop ileostomy, double setup (N/A)     Subjective     Daily HPI: NAEON, adequate pain control, no n/v, positive for flatus and succus  Objective     Vitals:    05/19/17 0708   BP: 133/88   Pulse: 71   Resp: 16   Temp: 97.8 °F (36.6 °C)         Intake/Output Summary (Last 24 hours) at 05/19/17 1135  Last data filed at 05/19/17 0803   Gross per 24 hour   Intake             3044 ml   Output             3025 ml   Net               19 ml       General:  male in nad  Neuro: AAO x4 MAEx4 PERRL  Chest: resps even unlabored, cap refill <2 sec  Abd: soft, rohan tender, without masses or organomegaly  Wound (if present): wound margins intact and healing well.  No signs of infection.distention improved  Stoma (if present): pink and viable, positive for flatus     Recent Results (from the past 24 hour(s))   Basic Metabolic Panel    Collection Time: 05/19/17  4:18 AM   Result Value Ref Range    Sodium 139 136 - 145 mmol/L    Potassium 3.6 3.5 - 5.1 mmol/L    Chloride 106 95 - 110 mmol/L    CO2 22 (L) 23 - 29 mmol/L    Glucose 90 70 - 110 mg/dL    BUN, Bld 6 6 - 20 mg/dL    Creatinine 0.8 0.5 - 1.4 mg/dL    Calcium 8.5 (L) 8.7 - 10.5 mg/dL    Anion Gap 11 8 - 16 mmol/L    eGFR if African American >60.0 >60 mL/min/1.73 m^2    eGFR if non African American >60.0 >60 mL/min/1.73 m^2   CBC with Auto Differential    Collection Time: 05/19/17  4:18 AM   Result Value Ref Range    WBC 6.00 3.90 - 12.70 K/uL    RBC 3.32 (L) 4.60 - 6.20 M/uL    Hemoglobin 10.5 (L) 14.0 - 18.0 g/dL    Hematocrit 31.3 (L) 40.0 - 54.0 %    MCV 94 82 - 98 fL    MCH 31.6 (H) 27.0 - 31.0 pg    MCHC 33.5 32.0 - 36.0 %    RDW 13.5 11.5 - 14.5 %    Platelets 206 150 - 350 K/uL    MPV 9.5 9.2 - 12.9 fL    Gran # 4.1 1.8 - 7.7 K/uL    Lymph # 0.9 (L) 1.0 - 4.8 K/uL    Mono # 0.8 0.3 -  1.0 K/uL    Eos # 0.3 0.0 - 0.5 K/uL    Baso # 0.01 0.00 - 0.20 K/uL    Gran% 68.2 38.0 - 73.0 %    Lymph% 14.2 (L) 18.0 - 48.0 %    Mono% 12.7 4.0 - 15.0 %    Eosinophil% 4.5 0.0 - 8.0 %    Basophil% 0.2 0.0 - 1.9 %    Differential Method Automated    Magnesium    Collection Time: 05/19/17  4:18 AM   Result Value Ref Range    Magnesium 1.8 1.6 - 2.6 mg/dL   Phosphorus    Collection Time: 05/19/17  4:18 AM   Result Value Ref Range    Phosphorus 2.7 2.7 - 4.5 mg/dL       Assessment and Plan   s/p LAR with coloanal    Plan:  Continue low fiber diet  Consult wound care for ostomy education  Enc amb and IS  Monitor labs   Discharge today or in am with pain controlled with oral pain meds

## 2017-05-19 NOTE — PROGRESS NOTES
Pt sitting up in bed watching television. States he just came back from his walk around the floor.   His wife plans to be here this morning for an ostomy lesson . Pt will call when she arrives and we will demonstate the steps to changing his ileostomy.  Mario Conde RN,CWON  [id number]

## 2017-05-19 NOTE — PLAN OF CARE
Pt is anticipating discharge home with HH care and support of his wife, possibly tomorrow.  Pt has no preference in a HH agency, CM notified .    CM scheduled postop follow-up appointments:  Future Appointments  Date Time Provider Department Center   5/31/2017 9:15 AM Nacho Dominguez MD [facility] [address]   5/31/2017 9:30 AM JOSE Grant [facility] [address]        05/19/17 1500   Final Note   Assessment Type Final Discharge Note   Discharge Disposition Home-Health   Discharge planning education complete? Yes   Hospital Follow Up  Appt(s) scheduled? Yes   Discharge plans and expectations educations in teach back method with documentation complete? Yes   Offered [facility] -- Bedside Delivery? n/a   Discharge/Hospital Encounter Summary to (non[facility]) PCP n/a   Referral to Outpatient Case Management complete? n/a   Referral to / orders for Home Health Complete? Yes  (See SW's notes )   30 day supply of medicines given at discharge, if documented non-compliance / non-adherence? n/a   Any social issues identified prior to discharge? No   Did you assess the readiness or willingness of the family or caregiver to support self management of care? No

## 2017-05-20 LAB
ANION GAP SERPL CALC-SCNC: 9 MMOL/L
BASOPHILS # BLD AUTO: 0.01 K/UL
BASOPHILS NFR BLD: 0.2 %
BUN SERPL-MCNC: 9 MG/DL
CALCIUM SERPL-MCNC: 9.1 MG/DL
CHLORIDE SERPL-SCNC: 105 MMOL/L
CO2 SERPL-SCNC: 25 MMOL/L
CREAT SERPL-MCNC: 0.9 MG/DL
DIFFERENTIAL METHOD: ABNORMAL
EOSINOPHIL # BLD AUTO: 0.4 K/UL
EOSINOPHIL NFR BLD: 8.2 %
ERYTHROCYTE [DISTWIDTH] IN BLOOD BY AUTOMATED COUNT: 13.7 %
EST. GFR  (AFRICAN AMERICAN): >60 ML/MIN/1.73 M^2
EST. GFR  (NON AFRICAN AMERICAN): >60 ML/MIN/1.73 M^2
GLUCOSE SERPL-MCNC: 96 MG/DL
HCT VFR BLD AUTO: 31.2 %
HGB BLD-MCNC: 11 G/DL
LYMPHOCYTES # BLD AUTO: 0.9 K/UL
LYMPHOCYTES NFR BLD: 17.1 %
MAGNESIUM SERPL-MCNC: 2.1 MG/DL
MCH RBC QN AUTO: 32.9 PG
MCHC RBC AUTO-ENTMCNC: 35.3 %
MCV RBC AUTO: 93 FL
MONOCYTES # BLD AUTO: 0.7 K/UL
MONOCYTES NFR BLD: 14.7 %
NEUTROPHILS # BLD AUTO: 3 K/UL
NEUTROPHILS NFR BLD: 59.6 %
PHOSPHATE SERPL-MCNC: 3.8 MG/DL
PLATELET # BLD AUTO: 214 K/UL
PMV BLD AUTO: 9.2 FL
POTASSIUM SERPL-SCNC: 4 MMOL/L
RBC # BLD AUTO: 3.34 M/UL
SODIUM SERPL-SCNC: 139 MMOL/L
WBC # BLD AUTO: 4.98 K/UL

## 2017-05-20 PROCEDURE — 36415 COLL VENOUS BLD VENIPUNCTURE: CPT

## 2017-05-20 PROCEDURE — 63600175 PHARM REV CODE 636 W HCPCS: Performed by: COLON & RECTAL SURGERY

## 2017-05-20 PROCEDURE — 99900035 HC TECH TIME PER 15 MIN (STAT)

## 2017-05-20 PROCEDURE — 84100 ASSAY OF PHOSPHORUS: CPT

## 2017-05-20 PROCEDURE — 85025 COMPLETE CBC W/AUTO DIFF WBC: CPT

## 2017-05-20 PROCEDURE — 80048 BASIC METABOLIC PNL TOTAL CA: CPT

## 2017-05-20 PROCEDURE — 20600001 HC STEP DOWN PRIVATE ROOM

## 2017-05-20 PROCEDURE — 83735 ASSAY OF MAGNESIUM: CPT

## 2017-05-20 PROCEDURE — 25000003 PHARM REV CODE 250: Performed by: COLON & RECTAL SURGERY

## 2017-05-20 PROCEDURE — 25000003 PHARM REV CODE 250: Performed by: NURSE PRACTITIONER

## 2017-05-20 RX ADMIN — IBUPROFEN 800 MG: 800 INJECTION INTRAVENOUS at 05:05

## 2017-05-20 RX ADMIN — ENOXAPARIN SODIUM 40 MG: 100 INJECTION SUBCUTANEOUS at 04:05

## 2017-05-20 RX ADMIN — LEVOTHYROXINE SODIUM 50 MCG: 50 TABLET ORAL at 07:05

## 2017-05-20 RX ADMIN — OXYCODONE HYDROCHLORIDE 10 MG: 5 TABLET ORAL at 10:05

## 2017-05-20 RX ADMIN — OXYCODONE HYDROCHLORIDE 10 MG: 5 TABLET ORAL at 01:05

## 2017-05-20 RX ADMIN — OXYCODONE HYDROCHLORIDE 10 MG: 5 TABLET ORAL at 04:05

## 2017-05-20 RX ADMIN — IBUPROFEN 800 MG: 800 INJECTION INTRAVENOUS at 11:05

## 2017-05-20 RX ADMIN — HYDROMORPHONE HYDROCHLORIDE 0.5 MG: 1 INJECTION, SOLUTION INTRAMUSCULAR; INTRAVENOUS; SUBCUTANEOUS at 07:05

## 2017-05-20 RX ADMIN — PRAVASTATIN SODIUM 80 MG: 40 TABLET ORAL at 07:05

## 2017-05-20 NOTE — PROGRESS NOTES
COLON RECTAL SURGERY  PROGRESS NOTE    LENGTH OF STAY: 5  POST OPERATIVE DAY: 5 Days Post-Op for Procedure(s) (LRB):  RESECTION-COLON AND RECTAL-LOW ANTERIOR, coloanal pullthrough, loop ileostomy, double setup (N/A)     Subjective     Daily HPI: NAEON  Moderate pain control,   Some nausea and vomiting  Continues to have flatus and stool in ostomy appliance    Objective     Vitals:    05/20/17 0746   BP: (!) 152/91   Pulse: 97   Resp: 18   Temp: 97.5 °F (36.4 °C)         Intake/Output Summary (Last 24 hours) at 05/20/17 1131  Last data filed at 05/20/17 1000   Gross per 24 hour   Intake           2658.5 ml   Output             1520 ml   Net           1138.5 ml       General: NAD  Neuro: AAO x4  Chest: resps even unlabored, cap refill <2 sec  Abd: soft, rohan tender, without masses or organomegaly  Wound (if present): wound margins intact and healing well.  No signs of infection.distention improved  Stoma (if present): pink and viable, positive for flatus     Recent Results (from the past 24 hour(s))   Basic Metabolic Panel    Collection Time: 05/20/17  4:29 AM   Result Value Ref Range    Sodium 139 136 - 145 mmol/L    Potassium 4.0 3.5 - 5.1 mmol/L    Chloride 105 95 - 110 mmol/L    CO2 25 23 - 29 mmol/L    Glucose 96 70 - 110 mg/dL    BUN, Bld 9 6 - 20 mg/dL    Creatinine 0.9 0.5 - 1.4 mg/dL    Calcium 9.1 8.7 - 10.5 mg/dL    Anion Gap 9 8 - 16 mmol/L    eGFR if African American >60.0 >60 mL/min/1.73 m^2    eGFR if non African American >60.0 >60 mL/min/1.73 m^2   CBC with Auto Differential    Collection Time: 05/20/17  4:29 AM   Result Value Ref Range    WBC 4.98 3.90 - 12.70 K/uL    RBC 3.34 (L) 4.60 - 6.20 M/uL    Hemoglobin 11.0 (L) 14.0 - 18.0 g/dL    Hematocrit 31.2 (L) 40.0 - 54.0 %    MCV 93 82 - 98 fL    MCH 32.9 (H) 27.0 - 31.0 pg    MCHC 35.3 32.0 - 36.0 %    RDW 13.7 11.5 - 14.5 %    Platelets 214 150 - 350 K/uL    MPV 9.2 9.2 - 12.9 fL    Gran # 3.0 1.8 - 7.7 K/uL    Lymph # 0.9 (L) 1.0 - 4.8 K/uL    Mono  # 0.7 0.3 - 1.0 K/uL    Eos # 0.4 0.0 - 0.5 K/uL    Baso # 0.01 0.00 - 0.20 K/uL    Gran% 59.6 38.0 - 73.0 %    Lymph% 17.1 (L) 18.0 - 48.0 %    Mono% 14.7 4.0 - 15.0 %    Eosinophil% 8.2 (H) 0.0 - 8.0 %    Basophil% 0.2 0.0 - 1.9 %    Differential Method Automated    Magnesium    Collection Time: 05/20/17  4:29 AM   Result Value Ref Range    Magnesium 2.1 1.6 - 2.6 mg/dL   Phosphorus    Collection Time: 05/20/17  4:29 AM   Result Value Ref Range    Phosphorus 3.8 2.7 - 4.5 mg/dL       Assessment and Plan   s/p LAR with coloanal    Plan:  Continue low fiber diet  Wound care for ostomy education  Enc amb and IS  Monitor labs   Dispo: D/c possibly tomorrow pending pain/nausea control

## 2017-05-20 NOTE — PLAN OF CARE
Problem: Patient Care Overview  Goal: Plan of Care Review  POC reviewed w/ pt and family. Verbalized understanding. AAOX4, slight hypertension for noon vitals due to pain. Increased pain noted today, denies pain at this time. Pt output to ileostomy increased today, per md pt did not go home today due to low output of ileostomy. Decreased appetite, did not eat bfast or dinner but ate 100% of lunch. Denies n/v. Will continue to monitor.

## 2017-05-20 NOTE — PLAN OF CARE
Problem: Patient Care Overview  Goal: Plan of Care Review  Outcome: Ongoing (interventions implemented as appropriate)  Plan of care reviewed with patient who verbalized understanding. Alert and oriented when awake, slept between care. Vitals stable and within patient's baseline since admission on room air. BP trending upward. Pain controlled on current regimen. Complains of incisional abdominal pain. Midline incision open to air and clean. Tolerating low fiber/low residue diet, no nausea reported. Up independently to toilet, urine output adequate overnight (patient forgets to use urinal for accurate measurement). See flowsheets for ileostomy output. Refused TEDs/SCDs, ambulates well in room and hallways; also receives pharmacologic DVT prophylaxis. Instructed to call for help as needed, call light in reach. Bed in lowest position and brake set. See flowsheets for detailed assessment. Continuing to monitor.

## 2017-05-21 VITALS
RESPIRATION RATE: 16 BRPM | DIASTOLIC BLOOD PRESSURE: 76 MMHG | SYSTOLIC BLOOD PRESSURE: 129 MMHG | BODY MASS INDEX: 30.35 KG/M2 | OXYGEN SATURATION: 96 % | WEIGHT: 212 LBS | TEMPERATURE: 98 F | HEIGHT: 70 IN | HEART RATE: 65 BPM

## 2017-05-21 LAB
ANION GAP SERPL CALC-SCNC: 8 MMOL/L
BASOPHILS # BLD AUTO: 0.01 K/UL
BASOPHILS NFR BLD: 0.2 %
BUN SERPL-MCNC: 8 MG/DL
CALCIUM SERPL-MCNC: 8.6 MG/DL
CHLORIDE SERPL-SCNC: 104 MMOL/L
CO2 SERPL-SCNC: 25 MMOL/L
CREAT SERPL-MCNC: 0.8 MG/DL
DIFFERENTIAL METHOD: ABNORMAL
EOSINOPHIL # BLD AUTO: 0.4 K/UL
EOSINOPHIL NFR BLD: 7.5 %
ERYTHROCYTE [DISTWIDTH] IN BLOOD BY AUTOMATED COUNT: 13.8 %
EST. GFR  (AFRICAN AMERICAN): >60 ML/MIN/1.73 M^2
EST. GFR  (NON AFRICAN AMERICAN): >60 ML/MIN/1.73 M^2
GLUCOSE SERPL-MCNC: 123 MG/DL
HCT VFR BLD AUTO: 30.8 %
HGB BLD-MCNC: 10.7 G/DL
LYMPHOCYTES # BLD AUTO: 0.8 K/UL
LYMPHOCYTES NFR BLD: 16.2 %
MAGNESIUM SERPL-MCNC: 1.9 MG/DL
MCH RBC QN AUTO: 32.7 PG
MCHC RBC AUTO-ENTMCNC: 34.7 %
MCV RBC AUTO: 94 FL
MONOCYTES # BLD AUTO: 0.7 K/UL
MONOCYTES NFR BLD: 14.1 %
NEUTROPHILS # BLD AUTO: 3.2 K/UL
NEUTROPHILS NFR BLD: 61.4 %
PHOSPHATE SERPL-MCNC: 3.5 MG/DL
PLATELET # BLD AUTO: 213 K/UL
PMV BLD AUTO: 9.7 FL
POTASSIUM SERPL-SCNC: 3.5 MMOL/L
RBC # BLD AUTO: 3.27 M/UL
SODIUM SERPL-SCNC: 137 MMOL/L
WBC # BLD AUTO: 5.18 K/UL

## 2017-05-21 PROCEDURE — 25000003 PHARM REV CODE 250: Performed by: NURSE PRACTITIONER

## 2017-05-21 PROCEDURE — 85025 COMPLETE CBC W/AUTO DIFF WBC: CPT

## 2017-05-21 PROCEDURE — 36415 COLL VENOUS BLD VENIPUNCTURE: CPT

## 2017-05-21 PROCEDURE — 83735 ASSAY OF MAGNESIUM: CPT

## 2017-05-21 PROCEDURE — 80048 BASIC METABOLIC PNL TOTAL CA: CPT

## 2017-05-21 PROCEDURE — 25000003 PHARM REV CODE 250: Performed by: COLON & RECTAL SURGERY

## 2017-05-21 PROCEDURE — 84100 ASSAY OF PHOSPHORUS: CPT

## 2017-05-21 PROCEDURE — 63600175 PHARM REV CODE 636 W HCPCS: Performed by: COLON & RECTAL SURGERY

## 2017-05-21 RX ORDER — OXYCODONE AND ACETAMINOPHEN 10; 325 MG/1; MG/1
1 TABLET ORAL EVERY 6 HOURS PRN
Qty: 50 TABLET | Refills: 0 | Status: ON HOLD | OUTPATIENT
Start: 2017-05-21 | End: 2017-06-23 | Stop reason: CLARIF

## 2017-05-21 RX ADMIN — OXYCODONE HYDROCHLORIDE 10 MG: 5 TABLET ORAL at 05:05

## 2017-05-21 RX ADMIN — OXYCODONE HYDROCHLORIDE 10 MG: 5 TABLET ORAL at 11:05

## 2017-05-21 RX ADMIN — IBUPROFEN 800 MG: 800 INJECTION INTRAVENOUS at 12:05

## 2017-05-21 RX ADMIN — IBUPROFEN 800 MG: 800 INJECTION INTRAVENOUS at 05:05

## 2017-05-21 RX ADMIN — LEVOTHYROXINE SODIUM 50 MCG: 50 TABLET ORAL at 08:05

## 2017-05-21 RX ADMIN — PRAVASTATIN SODIUM 80 MG: 40 TABLET ORAL at 08:05

## 2017-05-21 RX ADMIN — OXYCODONE HYDROCHLORIDE 10 MG: 5 TABLET ORAL at 08:05

## 2017-05-21 RX ADMIN — HYDROMORPHONE HYDROCHLORIDE 0.5 MG: 1 INJECTION, SOLUTION INTRAMUSCULAR; INTRAVENOUS; SUBCUTANEOUS at 02:05

## 2017-05-21 NOTE — DISCHARGE SUMMARY
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 Neg scans has seen XRT in BR.   CT 2/21/17 No evidence of mets mass not seen. MRI scheduled. Saw Dr Camacho who recommended APR. Came to [facility] for second opinion Feb 15, 2017.     Procedure(s) (LRB):  RESECTION-COLON AND RECTAL-LOW ANTERIOR, coloanal pullthrough, loop ileostomy, double setup (N/A)     Hospital Course: Pt went to OR for LAR with loop ileostomy. Pt tolerated the procedure well and had an expected uncomplicated post operative course. On discharge, pain was well controlled, tolerating low residue diet and ambulating without assistance.     Consults:   Consults         Status Ordering Provider     Inpatient consult to Social Work  Once     Provider:  (Not yet assigned)    Acknowledged NACHO NORMAN          Significant Diagnostic Studies: Labs: All labs within the past 24 hours have been reviewed    Pending Diagnostic Studies:     None        Final Active Diagnoses:    Diagnosis Date Noted POA    PRINCIPAL PROBLEM:  Rectal cancer [C20] 02/15/2017 Yes      Problems Resolved During this Admission:    Diagnosis Date Noted Date Resolved POA      Discharged Condition: good    Disposition: [facility] [place] [facility]    Follow Up:  Follow-up Information     [facility].    Specialties:  DME Provider, Home Health Services  Why:  home health  Contact information:  [address] [facility] [address]  [phone]             Nacho Norman MD. Go on 5/31/2017.    Specialty:  Colon and Rectal Surgery  Contact information:  [facility]  [address]  [phone]             Kemi Soto, CNS. Go on 5/31/2017.    Specialties:  Colon and Rectal Surgery, Wound Care  Contact information:  [address]  [facility] [address]  [phone]                 Patient Instructions:     Diet general     Activity as tolerated     Lifting restrictions   Order Comments: No lifting over 10 pounds for 6 weeks.     Call MD for:  temperature >100.4     Call MD for:  persistent nausea and vomiting or diarrhea     Call MD for:  severe uncontrolled pain     Call MD for:  redness, tenderness, or signs of infection (pain, swelling, redness, odor or green/yellow discharge around incision site)     Call MD for:  difficulty breathing or increased cough     Call MD for:  severe persistent headache     Call MD for:  worsening rash     Call MD for:  persistent dizziness, light-headedness, or visual disturbances     Call MD for:  increased confusion or weakness       Medications:  Reconciled Home Medications:   Current Discharge Medication List      START taking these medications    Details   oxycodone-acetaminophen (PERCOCET)  mg per tablet Take 1 tablet by mouth every 6 (six) hours as needed for Pain.  Qty: 50 tablet, Refills: 0         CONTINUE these medications which have NOT CHANGED    Details   levothyroxine (SYNTHROID) 50 MCG tablet Take 1 tablet by mouth once daily.      metronidazole (FLAGYL) 250 MG tablet Take tablet at 1300, 1400, and 2300 day prior to surgery  Qty: 3 tablet, Refills: 0      neomycin (MYCIFRADIN) 500 mg Tab Take 500 mg by mouth.      pravastatin (PRAVACHOL) 80 MG tablet Take 1 tablet by mouth once daily.      capecitabine (XELODA) 500 MG Tab              Nacho Robles MD  General Surgery  [facility]

## 2017-05-21 NOTE — PROGRESS NOTES
COLON RECTAL SURGERY  PROGRESS NOTE    LENGTH OF STAY: 6  POST OPERATIVE DAY: 6 Days Post-Op for Procedure(s) (LRB):  RESECTION-COLON AND RECTAL-LOW ANTERIOR, coloanal pullthrough, loop ileostomy, double setup (N/A)     Subjective     Daily HPI: NAEON  Better pain control  Tolerating low residue diet with minimal n/v  Good output in ostomy bag    Objective     Vitals:    05/21/17 0511   BP: (!) 151/85   Pulse: 65   Resp: 18   Temp: 97.8 °F (36.6 °C)         Intake/Output Summary (Last 24 hours) at 05/21/17 0638  Last data filed at 05/21/17 0529   Gross per 24 hour   Intake             2230 ml   Output             2720 ml   Net             -490 ml       General: NAD  Neuro: AAO x4  Chest: resps even unlabored, cap refill <2 sec  Abd: soft, rohan tender, without masses or organomegaly  Wound (if present): wound margins intact and healing well.  No signs of infection.distention improved  Stoma (if present): pink and viable, positive for flatus     No results found for this or any previous visit (from the past 24 hour(s)).    Assessment and Plan   s/p LAR with coloanal    Plan:  Continue low fiber diet  Wound care for ostomy education  Enc amb and IS  Monitor labs   Dispo: D/c home after discussing with staff

## 2017-05-21 NOTE — PROGRESS NOTES
Discharge instructions given to pt, verbalized understanding. Pt given prescription for pain medication and instructed to follow up with Dr. Dominguez. Pt being discharged with home health orders. IV d/c'd. Pt denies any further questions/concerns. Pt awaiting ride.

## 2017-05-21 NOTE — PLAN OF CARE
Problem: Patient Care Overview  Goal: Plan of Care Review  Outcome: Ongoing (interventions implemented as appropriate)  Plan of care reviewed with patient who verbalized understanding. Alert and oriented when awake, slept between care. Vitals stable and within patient's baseline since admission on room air. BP trending upward. Pain controlled on current regimen. Complains of incisional abdominal pain. Midline incision open to air and clean. Tolerating low fiber/low residue diet, no nausea reported. Up independently to toilet and urinal in reach urine output adequate overnight. See flowsheets for ileostomy output. Refused TEDs/SCDs, ambulates well in room and hallways; also receives pharmacologic DVT prophylaxis. Instructed to call for help as needed, call light in reach. Bed in lowest position and brake set. See flowsheets for detailed assessment. Continuing to monitor.

## 2017-05-21 NOTE — PLAN OF CARE
CM recv'd a call from Dr. Robles, he stated the pt will be d/c'd today and wanted to make sure the pt's HH was arranged. Advised according to  notes we are waiting on the staff doctor to co-sign the previously written HH orders. Checked the orders and they were co-signed this morning by Dr. Dominguez. Advised I would call HH agency and make the arrangements.     Spoke to the pt and he stated he was going to the d/c address of [address] [place] [address] and his cell [phone] is the best way to contact him. He confirmed the [facility] team gave him ostomy supplies and set him up to recv delivery of ostomy supplies at home.     Spoke to June, on call nurse for [facility]  ([phone]), she stated she has no information on this pt in her system. She stated I can fax a complete referral to [phone] and they will see the pt tomorrow. Pt's d/c address and contact number on the face sheet for [facility].     Complete referral faxed to the number above with fax confirmation e-mail returned.     Pt is clear to d/c from a CM standpoint.

## 2017-05-21 NOTE — DISCHARGE INSTRUCTIONS
Low-Fiber Diet     Eggs are high in protein and easy to digest.     Eating a low-fiber diet means eating foods that dont have much fiber. These foods are easy to digest.  Most of the fiber that you eat passes undigested through your bowel. This is what forms stool. Low-fiber foods can help to slow down your bowel movements. When you eat a low-fiber diet, you have fewer stools. This lets your intestine rest.  Your healthcare provider will tell you how long you need to be on this diet. It may only be for a short time. Low-fiber foods often don't give you all the nutrients you need to keep healthy. Your provider may have you take certain vitamins while you are on this diet.  Reasons to eat a low-fiber diet  The goal of a low-fiber diet is to limit the size and number of your stools. It may be prescribed if you:  · Are having chemotherapy or radiation treatments  · Have had intestinal surgery  · Have a condition that affects your intestine, such as irritable bowel syndrome, Crohns disease, ulcerative colitis, or diverticulitis  General guidelines for a low-fiber diet  In general, a low-fiber diet means having fewer than 13 grams of fiber a day. Your provider may give you a list of things you can and cant eat or drink. Read food labels. Choose foods and drinks that have as close to zero grams of fiber as possible. Here are general guidelines to follow:  Breads, pasta, cereal, rice, and other starches (6 to 11 servings daily)  · What to choose: white bread, biscuits, muffins, and white rolls; plain crackers; waffles; white pasta; white rice; cream of wheat; grits; white pancakes; corn flakes; cooked potatoes without skin.  Fiber content of these foods should be less than 0.5 (1/2) gram per serving.  · What to avoid: whole-wheat or whole-grain breads, crackers, and pasta; breads with seeds or nuts; wheat germ; carmela crackers; cornbread; wild or brown rice; whole-grain, bran, and granola cereals; cereals with seeds,  nuts, coconut, or dried fruit; potatoes with skin  Milk and dairy (2 servings daily)  · What to choose: milk, buttermilk; yogurt or ice cream without seeds or nuts; custard or pudding; sour cream; cheese and cottage cheese  · What to avoid: ice cream and yogurt with seeds, nuts, or fruit chunks  Fruit (2 to 4 servings daily)  · What to choose: ripe banana; ripe nectarine, peach, apricot, papaya, and plum; soft honeydew melon and cantaloupe; cooked or canned fruit without skin or seeds (not sweetened with sorbitol); applesauce; strained fruit juice (without pulp)  · What to avoid: raw or dried fruit; all berries; raisins; canned and raw pineapple; prunes and prune juice; fruit juice with pulp  Vegetables (3 to 5 servings daily)  · What to choose: well-cooked or canned vegetables without seeds, such as spinach, eggplant, green and wax beans, carrots, yellow squash, pumpkin; lettuce on a sandwich  · What to avoid: all raw or steamed vegetables; vegetables with seeds, such as unstrained tomato sauce; green peas; lima beans; broccoli; corn; parsnips  Meats and protein (4 to 6 ounces daily)  · What to choose: tender, well-cooked meat, including ground meat, poultry, and fish; eggs; tofu; creamy peanut butter  · What to avoid: tough, chewy meat with gristle; peas, including split, yellow, and black-eyed; beans, including navy, lima, black, garbanzo, soy, lutz, and lentil; peanuts and crunchy peanut butter   Fats, oils, sauces, condiments (fewer than 8 teaspoons daily)  · What to choose: butter, magarine, oils, whipped cream, sour cream, mayonnaise, smooth dressings and sauces; plain gravy; smooth condiments  · What to avoid: dressing with seeds or fruit chunks; pickles and relishes  Other foods and drinks  · What to choose: water; plain gelatin; plain puddings; pretzels; plain cookies and cakes; honey, syrup; decaffeinated drinks, including tea and coffee    · What to avoid: popcorn; potato chips; spicy foods; fried,  greasy foods; alcohol (ask your provider); marmalade, jam, and preserves; desserts that have seeds, nuts, coconut, dried fruit, whole grains or bran; candy that has seeds or nuts; drinks sweetened with sorbitol or other sugar substitutes; caffeinated drinks, including tea, coffee, soda, and energy drinks  Date Last Reviewed: 6/18/2015  © 7947-4357 [employer]. [address] [place] [address], [place] [address]. All rights reserved. This information is not intended as a substitute for professional medical care. Always follow your healthcare professional's instructions.

## 2017-05-31 ENCOUNTER — OFFICE VISIT (OUTPATIENT)
Dept: SURGERY | Facility: CLINIC | Age: 57
End: 2017-05-31
Payer: COMMERCIAL

## 2017-05-31 ENCOUNTER — OFFICE VISIT (OUTPATIENT)
Dept: WOUND CARE | Facility: CLINIC | Age: 57
End: 2017-05-31
Payer: COMMERCIAL

## 2017-05-31 VITALS
BODY MASS INDEX: 28.44 KG/M2 | DIASTOLIC BLOOD PRESSURE: 93 MMHG | HEIGHT: 70 IN | WEIGHT: 198.63 LBS | SYSTOLIC BLOOD PRESSURE: 147 MMHG | HEART RATE: 56 BPM

## 2017-05-31 DIAGNOSIS — Z85.048 HISTORY OF RECTAL CANCER: ICD-10-CM

## 2017-05-31 DIAGNOSIS — Z93.2 ILEOSTOMY IN PLACE: Primary | ICD-10-CM

## 2017-05-31 DIAGNOSIS — Z43.2 ATTENTION TO ILEOSTOMY: Primary | ICD-10-CM

## 2017-05-31 PROCEDURE — 99999 PR PBB SHADOW E&M-EST. PATIENT-LVL I: CPT | Mod: PBBFAC,,, | Performed by: CLINICAL NURSE SPECIALIST

## 2017-05-31 PROCEDURE — 99024 POSTOP FOLLOW-UP VISIT: CPT | Mod: S$GLB,,, | Performed by: CLINICAL NURSE SPECIALIST

## 2017-05-31 PROCEDURE — 99999 PR PBB SHADOW E&M-EST. PATIENT-LVL IV: CPT | Mod: PBBFAC,,, | Performed by: COLON & RECTAL SURGERY

## 2017-05-31 PROCEDURE — 99024 POSTOP FOLLOW-UP VISIT: CPT | Mod: S$GLB,,, | Performed by: COLON & RECTAL SURGERY

## 2017-05-31 RX ORDER — HYDROCODONE BITARTRATE AND ACETAMINOPHEN 5; 325 MG/1; MG/1
1 TABLET ORAL EVERY 6 HOURS PRN
Qty: 90 TABLET | Refills: 0 | Status: SHIPPED | OUTPATIENT
Start: 2017-05-31 | End: 2018-02-07

## 2017-05-31 NOTE — PROGRESS NOTES
This patient is unknown to me and is here in clinic today for first post op evaluation related to ileostomy. Surgery done 5/15 by Dr. Dominguez. The patient reports no  problems with  new ostomy. The patient is receiving home health care with [facility].   Pain level today is reported as  0.    The ileostomy is 11/4  octaviano high budded loop stoma.  The patient is currently  wearing a 1piece pouching system by [employer].   Average wear time is 2 days.   Peristomal skin is clear    There is no  mucocutaneous separation.  Pt is coping well with the new ostomy.  SUPPLIES/DME: NONE needed and set for closure 6/23  Patient enrolled in [employer] [facility]. Pt gives verbal permission and understanding a representative will call them regarding samples sent and follow up needs.  Samples requested today based on needs or patient requests today.    Pouching concerns include:none      SPECIAL NEEDS:  Pt counseled on skin care, nutrition, hydration as well as  how to order ostomy supplies.  I spent greater than 50% of this 25 minute visit in face to face counseling.

## 2017-05-31 NOTE — PROGRESS NOTES
Patient ID:  Murphy Angel is a 56 y.o. male     Chief Complaint:   Chief Complaint   Patient presents with    Follow-up        HPI: 5/15/17 LAR and DLI   Path :L y pT3 pN0 pMX     Will completed chemoradiotherapy April 4    c-scope 1/31/17 for rectal bleeding fpounf to have mod diff adenocarcinoma in low rectum. Neg scans has seen XRT in [facility].   CT 2/21/17 No evidence of mets mass not seen. MRI scheduled. Saw Dr Camacho who recommended APR. Came to [facility] for second opinion Feb 15, 2017.     ROS:        Constitutional: No fever, chills, activity or appetite change.      HENT: No hearing loss, facial swelling, neck pain or stiffness.       Eyes: No discharge, itching and visual disturbance.      Respiratory: No apnea, cough, choking or shortness of breath.       Cardiovascular: No leg swelling or chest pain      Gastrointestinal: No abdominal distention or change in bowel habbits     Genitourinary: No dysuria, frequency or flank pain.      Musculoskeletal: No arthralgias or gait problem.      Neurological: No dizziness, seizures or weakness.      Hematological: No adenopathy.      Psychiatric/Behavioral: No hallucinations or behavioral problems.       PE:    APPEARANCE: Well nourished, well developed, in no acute distress.   CHEST: Lungs clear. Normal respiratory effort.  CARDIOVASCULAR: Normal rhythm. No edema.  ABDOMEN: Soft. No tenderness or masses.  Rectum:  Normal skin, NST, 2 cm mass left posterior, not fixed distal edge 2 cm above puborectalis    Musculoskeletal: Symmetric, normal range of motion and strength.   Neurological: Alert and oriented to person, place, and time. Normal reflexes.   Skin: Skin is warm and dry.   Psychiatric: Normal mood and affect. Behavior is normal and appropriate.     Impression: Hx rectal cancer, DLI  PLAN:  Pouchagram June 22, if OK will close stoma June 23. RTC June 22. CAse req in

## 2017-06-21 ENCOUNTER — TELEPHONE (OUTPATIENT)
Dept: RADIOLOGY | Facility: HOSPITAL | Age: 57
End: 2017-06-21

## 2017-06-22 ENCOUNTER — HOSPITAL ENCOUNTER (OUTPATIENT)
Dept: RADIOLOGY | Facility: HOSPITAL | Age: 57
Discharge: HOME | DRG: 331 | End: 2017-06-22
Attending: COLON & RECTAL SURGERY
Payer: COMMERCIAL

## 2017-06-22 ENCOUNTER — OFFICE VISIT (OUTPATIENT)
Dept: SURGERY | Facility: CLINIC | Age: 57
DRG: 331 | End: 2017-06-22
Payer: COMMERCIAL

## 2017-06-22 VITALS
DIASTOLIC BLOOD PRESSURE: 88 MMHG | BODY MASS INDEX: 28.72 KG/M2 | HEART RATE: 106 BPM | SYSTOLIC BLOOD PRESSURE: 119 MMHG | HEIGHT: 70 IN | WEIGHT: 200.63 LBS

## 2017-06-22 DIAGNOSIS — Z85.048 HISTORY OF RECTAL CANCER: ICD-10-CM

## 2017-06-22 DIAGNOSIS — Z85.048 HISTORY OF RECTAL OR ANAL CANCER: ICD-10-CM

## 2017-06-22 DIAGNOSIS — Z93.2 ILEOSTOMY IN PLACE: ICD-10-CM

## 2017-06-22 DIAGNOSIS — Z93.2 ILEOSTOMY IN PLACE: Primary | ICD-10-CM

## 2017-06-22 PROBLEM — C20 RECTAL CANCER: Status: RESOLVED | Noted: 2017-02-15 | Resolved: 2017-06-22

## 2017-06-22 PROCEDURE — 99024 POSTOP FOLLOW-UP VISIT: CPT | Mod: S$GLB,,, | Performed by: COLON & RECTAL SURGERY

## 2017-06-22 PROCEDURE — 74270 X-RAY XM COLON 1CNTRST STD: CPT | Mod: 26,,, | Performed by: RADIOLOGY

## 2017-06-22 PROCEDURE — 99999 PR PBB SHADOW E&M-EST. PATIENT-LVL III: CPT | Mod: PBBFAC,,, | Performed by: COLON & RECTAL SURGERY

## 2017-06-22 RX ORDER — METRONIDAZOLE 500 MG/100ML
500 INJECTION, SOLUTION INTRAVENOUS
Status: CANCELLED | OUTPATIENT
Start: 2017-06-22

## 2017-06-22 RX ORDER — ACETAMINOPHEN 10 MG/ML
1000 INJECTION, SOLUTION INTRAVENOUS
Status: CANCELLED | OUTPATIENT
Start: 2017-06-22 | End: 2017-06-22

## 2017-06-22 RX ORDER — SODIUM CHLORIDE 9 MG/ML
INJECTION, SOLUTION INTRAVENOUS CONTINUOUS
Status: CANCELLED | OUTPATIENT
Start: 2017-06-22

## 2017-06-22 RX ADMIN — DIATRIZOATE MEGLUMINE AND DIATRIZOATE SODIUM 240 ML: 600; 100 SOLUTION ORAL; RECTAL at 08:06

## 2017-06-22 NOTE — PROGRESS NOTES
Patient ID:  Murphy Angel is a 56 y.o. male     Chief Complaint:   Chief Complaint   Patient presents with    Ileostomy    history rectal cancer'        HPI: Pouchagram 2/22/17 no leak    5/15/17 LAR and DLI   Path : y pT3 pN0 pMX     Will completed chemoradiotherapy April 4    c-scope 1/31/17 for rectal bleeding founf to have mod diff adenocarcinoma in low rectum. Neg scans has seen XRT in [facility].   CT 2/21/17 No evidence of mets mass not seen. MRI scheduled. Saw Dr Camacho who recommended APR. Came to [facility] for second opinion Feb 15, 2017.     ROS:        Constitutional: No fever, chills, activity or appetite change.      HENT: No hearing loss, facial swelling, neck pain or stiffness.       Eyes: No discharge, itching and visual disturbance.      Respiratory: No apnea, cough, choking or shortness of breath.       Cardiovascular: No leg swelling or chest pain      Gastrointestinal: No abdominal distention or change in bowel habbits     Genitourinary: No dysuria, frequency or flank pain.      Musculoskeletal: No arthralgias or gait problem.      Neurological: No dizziness, seizures or weakness.      Hematological: No adenopathy.      Psychiatric/Behavioral: No hallucinations or behavioral problems.       PE:    APPEARANCE: Well nourished, well developed, in no acute distress.   CHEST: Lungs clear. Normal respiratory effort.  CARDIOVASCULAR: Normal rhythm. No edema.  ABDOMEN: Soft. No tenderness or masses.  Rectum:  Normal skin, NST, 2 cm mass left posterior, not fixed distal edge 2 cm above puborectalis    Musculoskeletal: Symmetric, normal range of motion and strength.   Neurological: Alert and oriented to person, place, and time. Normal reflexes.   Skin: Skin is warm and dry.   Psychiatric: Normal mood and affect. Behavior is normal and appropriate.     Impression: Hx rectal cancer, DLI  PLAN:    close stoma June 23.   I have explained the procedure including indications, alternatives, expected outcomes and  potential complications. The patient appears to understand and gives informed consent. The patient is medically ready for surgery.

## 2017-06-22 NOTE — H&P
Murphy Angel is a 56 y.o. male      Chief Complaint:       Chief Complaint   Patient presents with    Ileostomy    history rectal cancer'         HPI: Pouchagram 2/22/17 no leak     5/15/17 LAR and DLI   Path : y pT3 pN0 pMX      Will completed chemoradiotherapy April 4     c-scope 1/31/17 for rectal bleeding founf to have mod diff adenocarcinoma in low rectum. Neg scans has seen XRT in [facility].   CT 2/21/17 No evidence of mets mass not seen. MRI scheduled. Saw Dr Camacho who recommended APR. Came to [facility] for second opinion Feb 15, 2017.      ROS:        Constitutional: No fever, chills, activity or appetite change.      HENT: No hearing loss, facial swelling, neck pain or stiffness.       Eyes: No discharge, itching and visual disturbance.      Respiratory: No apnea, cough, choking or shortness of breath.       Cardiovascular: No leg swelling or chest pain      Gastrointestinal: No abdominal distention or change in bowel habbits     Genitourinary: No dysuria, frequency or flank pain.      Musculoskeletal: No arthralgias or gait problem.      Neurological: No dizziness, seizures or weakness.      Hematological: No adenopathy.      Psychiatric/Behavioral: No hallucinations or behavioral problems.         PE:    APPEARANCE: Well nourished, well developed, in no acute distress.   CHEST: Lungs clear. Normal respiratory effort.  CARDIOVASCULAR: Normal rhythm. No edema.  ABDOMEN: Soft. No tenderness or masses.  Rectum:  Normal skin, NST, 2 cm mass left posterior, not fixed distal edge 2 cm above puborectalis    Musculoskeletal: Symmetric, normal range of motion and strength.   Neurological: Alert and oriented to person, place, and time. Normal reflexes.   Skin: Skin is warm and dry.   Psychiatric: Normal mood and affect. Behavior is normal and appropriate.      Impression: Hx rectal cancer, DLI  PLAN:    close stoma June 23.   I have explained the procedure including indications, alternatives, expected outcomes and  potential complications. The patient appears to understand and gives informed consent. The patient is medically ready for surgery.

## 2017-06-23 ENCOUNTER — SURGERY (OUTPATIENT)
Age: 57
End: 2017-06-23

## 2017-06-23 ENCOUNTER — HOSPITAL ENCOUNTER (INPATIENT)
Facility: HOSPITAL | Age: 57
LOS: 2 days | Discharge: HOME | DRG: 331 | End: 2017-06-25
Attending: COLON & RECTAL SURGERY | Admitting: COLON & RECTAL SURGERY
Payer: COMMERCIAL

## 2017-06-23 ENCOUNTER — ANESTHESIA (OUTPATIENT)
Dept: SURGERY | Facility: HOSPITAL | Age: 57
DRG: 331 | End: 2017-06-23
Payer: COMMERCIAL

## 2017-06-23 ENCOUNTER — ANESTHESIA EVENT (OUTPATIENT)
Dept: SURGERY | Facility: HOSPITAL | Age: 57
DRG: 331 | End: 2017-06-23
Payer: COMMERCIAL

## 2017-06-23 DIAGNOSIS — Z85.048 HISTORY OF RECTAL OR ANAL CANCER: ICD-10-CM

## 2017-06-23 DIAGNOSIS — Z93.2 ILEOSTOMY IN PLACE: ICD-10-CM

## 2017-06-23 DIAGNOSIS — Z93.2 ILEOSTOMY IN PLACE: Primary | ICD-10-CM

## 2017-06-23 PROCEDURE — 63600175 PHARM REV CODE 636 W HCPCS: Performed by: NURSE ANESTHETIST, CERTIFIED REGISTERED

## 2017-06-23 PROCEDURE — 37000008 HC ANESTHESIA 1ST 15 MINUTES: Performed by: COLON & RECTAL SURGERY

## 2017-06-23 PROCEDURE — 27201423 OPTIME MED/SURG SUP & DEVICES STERILE SUPPLY: Performed by: COLON & RECTAL SURGERY

## 2017-06-23 PROCEDURE — 71000033 HC RECOVERY, INTIAL HOUR: Performed by: COLON & RECTAL SURGERY

## 2017-06-23 PROCEDURE — 36000706: Performed by: COLON & RECTAL SURGERY

## 2017-06-23 PROCEDURE — 25000003 PHARM REV CODE 250: Performed by: COLON & RECTAL SURGERY

## 2017-06-23 PROCEDURE — 25000003 PHARM REV CODE 250: Performed by: NURSE ANESTHETIST, CERTIFIED REGISTERED

## 2017-06-23 PROCEDURE — 25000003 PHARM REV CODE 250: Performed by: SURGERY

## 2017-06-23 PROCEDURE — 36000707: Performed by: COLON & RECTAL SURGERY

## 2017-06-23 PROCEDURE — 71000039 HC RECOVERY, EACH ADD'L HOUR: Performed by: COLON & RECTAL SURGERY

## 2017-06-23 PROCEDURE — 20600001 HC STEP DOWN PRIVATE ROOM

## 2017-06-23 PROCEDURE — 63600175 PHARM REV CODE 636 W HCPCS: Performed by: SURGERY

## 2017-06-23 PROCEDURE — D9220A PRA ANESTHESIA: Mod: CRNA,,, | Performed by: NURSE ANESTHETIST, CERTIFIED REGISTERED

## 2017-06-23 PROCEDURE — D9220A PRA ANESTHESIA: Mod: ANES,,, | Performed by: ANESTHESIOLOGY

## 2017-06-23 PROCEDURE — 27000221 HC OXYGEN, UP TO 24 HOURS

## 2017-06-23 PROCEDURE — 37000009 HC ANESTHESIA EA ADD 15 MINS: Performed by: COLON & RECTAL SURGERY

## 2017-06-23 PROCEDURE — 0DQB0ZZ REPAIR ILEUM, OPEN APPROACH: ICD-10-PCS | Performed by: COLON & RECTAL SURGERY

## 2017-06-23 PROCEDURE — 63600175 PHARM REV CODE 636 W HCPCS: Performed by: ANESTHESIOLOGY

## 2017-06-23 PROCEDURE — 63600175 PHARM REV CODE 636 W HCPCS: Performed by: COLON & RECTAL SURGERY

## 2017-06-23 RX ORDER — LIDOCAINE HCL/PF 100 MG/5ML
SYRINGE (ML) INTRAVENOUS
Status: DISCONTINUED | OUTPATIENT
Start: 2017-06-23 | End: 2017-06-23

## 2017-06-23 RX ORDER — ONDANSETRON 2 MG/ML
4 INJECTION INTRAMUSCULAR; INTRAVENOUS EVERY 4 HOURS PRN
Status: DISCONTINUED | OUTPATIENT
Start: 2017-06-23 | End: 2017-06-25 | Stop reason: HOSPADM

## 2017-06-23 RX ORDER — MIDAZOLAM HYDROCHLORIDE 1 MG/ML
INJECTION, SOLUTION INTRAMUSCULAR; INTRAVENOUS
Status: DISCONTINUED | OUTPATIENT
Start: 2017-06-23 | End: 2017-06-23

## 2017-06-23 RX ORDER — HYDROMORPHONE HYDROCHLORIDE 1 MG/ML
0.5 INJECTION, SOLUTION INTRAMUSCULAR; INTRAVENOUS; SUBCUTANEOUS
Status: DISCONTINUED | OUTPATIENT
Start: 2017-06-23 | End: 2017-06-24

## 2017-06-23 RX ORDER — FENTANYL CITRATE 50 UG/ML
INJECTION, SOLUTION INTRAMUSCULAR; INTRAVENOUS
Status: DISCONTINUED | OUTPATIENT
Start: 2017-06-23 | End: 2017-06-23

## 2017-06-23 RX ORDER — OXYCODONE HYDROCHLORIDE 5 MG/1
10 TABLET ORAL EVERY 4 HOURS PRN
Status: DISCONTINUED | OUTPATIENT
Start: 2017-06-23 | End: 2017-06-25 | Stop reason: HOSPADM

## 2017-06-23 RX ORDER — PROPOFOL 10 MG/ML
VIAL (ML) INTRAVENOUS
Status: DISCONTINUED | OUTPATIENT
Start: 2017-06-23 | End: 2017-06-23

## 2017-06-23 RX ORDER — SODIUM CHLORIDE 9 MG/ML
INJECTION, SOLUTION INTRAVENOUS CONTINUOUS
Status: DISCONTINUED | OUTPATIENT
Start: 2017-06-23 | End: 2017-06-23

## 2017-06-23 RX ORDER — BUPIVACAINE HYDROCHLORIDE 2.5 MG/ML
INJECTION, SOLUTION EPIDURAL; INFILTRATION; INTRACAUDAL
Status: DISCONTINUED | OUTPATIENT
Start: 2017-06-23 | End: 2017-06-23 | Stop reason: HOSPADM

## 2017-06-23 RX ORDER — AMOXICILLIN 250 MG
1 CAPSULE ORAL 2 TIMES DAILY
Status: DISCONTINUED | OUTPATIENT
Start: 2017-06-23 | End: 2017-06-25 | Stop reason: HOSPADM

## 2017-06-23 RX ORDER — OXYCODONE HYDROCHLORIDE 5 MG/1
5 TABLET ORAL EVERY 4 HOURS PRN
Status: DISCONTINUED | OUTPATIENT
Start: 2017-06-23 | End: 2017-06-25 | Stop reason: HOSPADM

## 2017-06-23 RX ORDER — ACETAMINOPHEN 10 MG/ML
1000 INJECTION, SOLUTION INTRAVENOUS
Status: COMPLETED | OUTPATIENT
Start: 2017-06-23 | End: 2017-06-23

## 2017-06-23 RX ORDER — LEVOTHYROXINE SODIUM 50 UG/1
50 TABLET ORAL DAILY
Status: DISCONTINUED | OUTPATIENT
Start: 2017-06-23 | End: 2017-06-25 | Stop reason: HOSPADM

## 2017-06-23 RX ORDER — DIPHENHYDRAMINE HYDROCHLORIDE 50 MG/ML
12.5 INJECTION INTRAMUSCULAR; INTRAVENOUS EVERY 6 HOURS PRN
Status: DISCONTINUED | OUTPATIENT
Start: 2017-06-23 | End: 2017-06-25 | Stop reason: HOSPADM

## 2017-06-23 RX ORDER — SODIUM CHLORIDE 9 MG/ML
INJECTION, SOLUTION INTRAVENOUS CONTINUOUS
Status: DISCONTINUED | OUTPATIENT
Start: 2017-06-23 | End: 2017-06-24

## 2017-06-23 RX ORDER — ONDANSETRON 2 MG/ML
4 INJECTION INTRAMUSCULAR; INTRAVENOUS ONCE AS NEEDED
Status: DISCONTINUED | OUTPATIENT
Start: 2017-06-23 | End: 2017-06-23 | Stop reason: HOSPADM

## 2017-06-23 RX ORDER — LIDOCAINE HYDROCHLORIDE 10 MG/ML
1 INJECTION, SOLUTION EPIDURAL; INFILTRATION; INTRACAUDAL; PERINEURAL ONCE
Status: COMPLETED | OUTPATIENT
Start: 2017-06-23 | End: 2017-06-23

## 2017-06-23 RX ORDER — ONDANSETRON 2 MG/ML
INJECTION INTRAMUSCULAR; INTRAVENOUS
Status: DISCONTINUED | OUTPATIENT
Start: 2017-06-23 | End: 2017-06-23

## 2017-06-23 RX ORDER — METRONIDAZOLE 500 MG/100ML
500 INJECTION, SOLUTION INTRAVENOUS
Status: COMPLETED | OUTPATIENT
Start: 2017-06-23 | End: 2017-06-23

## 2017-06-23 RX ORDER — HYDROMORPHONE HYDROCHLORIDE 1 MG/ML
0.2 INJECTION, SOLUTION INTRAMUSCULAR; INTRAVENOUS; SUBCUTANEOUS EVERY 5 MIN PRN
Status: DISCONTINUED | OUTPATIENT
Start: 2017-06-23 | End: 2017-06-23 | Stop reason: HOSPADM

## 2017-06-23 RX ORDER — ACETAMINOPHEN 10 MG/ML
1000 INJECTION, SOLUTION INTRAVENOUS EVERY 6 HOURS
Status: DISPENSED | OUTPATIENT
Start: 2017-06-23 | End: 2017-06-24

## 2017-06-23 RX ORDER — FAMOTIDINE 10 MG/ML
20 INJECTION INTRAVENOUS EVERY 12 HOURS
Status: DISCONTINUED | OUTPATIENT
Start: 2017-06-23 | End: 2017-06-24

## 2017-06-23 RX ORDER — PRAVASTATIN SODIUM 40 MG/1
80 TABLET ORAL DAILY
Status: DISCONTINUED | OUTPATIENT
Start: 2017-06-23 | End: 2017-06-25 | Stop reason: HOSPADM

## 2017-06-23 RX ADMIN — STANDARDIZED SENNA CONCENTRATE AND DOCUSATE SODIUM 1 TABLET: 8.6; 5 TABLET, FILM COATED ORAL at 10:06

## 2017-06-23 RX ADMIN — METRONIDAZOLE 500 MG: 500 SOLUTION INTRAVENOUS at 08:06

## 2017-06-23 RX ADMIN — LIDOCAINE HYDROCHLORIDE 100 MG: 20 INJECTION, SOLUTION INTRAVENOUS at 08:06

## 2017-06-23 RX ADMIN — ACETAMINOPHEN 1000 MG: 10 INJECTION, SOLUTION INTRAVENOUS at 06:06

## 2017-06-23 RX ADMIN — DIPHENHYDRAMINE HYDROCHLORIDE 12.5 MG: 50 INJECTION, SOLUTION INTRAMUSCULAR; INTRAVENOUS at 09:06

## 2017-06-23 RX ADMIN — OXYCODONE HYDROCHLORIDE 10 MG: 5 TABLET ORAL at 04:06

## 2017-06-23 RX ADMIN — FAMOTIDINE 20 MG: 10 INJECTION, SOLUTION INTRAVENOUS at 09:06

## 2017-06-23 RX ADMIN — IBUPROFEN 800 MG: 800 INJECTION INTRAVENOUS at 08:06

## 2017-06-23 RX ADMIN — FENTANYL CITRATE 25 MCG: 50 INJECTION, SOLUTION INTRAMUSCULAR; INTRAVENOUS at 08:06

## 2017-06-23 RX ADMIN — BUPIVACAINE HYDROCHLORIDE 30 ML: 2.5 INJECTION, SOLUTION EPIDURAL; INFILTRATION; INTRACAUDAL; PERINEURAL at 09:06

## 2017-06-23 RX ADMIN — HYDROMORPHONE HYDROCHLORIDE 0.2 MG: 1 INJECTION, SOLUTION INTRAMUSCULAR; INTRAVENOUS; SUBCUTANEOUS at 10:06

## 2017-06-23 RX ADMIN — ONDANSETRON 4 MG: 2 INJECTION INTRAMUSCULAR; INTRAVENOUS at 09:06

## 2017-06-23 RX ADMIN — SODIUM CHLORIDE: 0.9 INJECTION, SOLUTION INTRAVENOUS at 08:06

## 2017-06-23 RX ADMIN — LEVOTHYROXINE SODIUM 50 MCG: 25 TABLET ORAL at 10:06

## 2017-06-23 RX ADMIN — OXYCODONE HYDROCHLORIDE 10 MG: 5 TABLET ORAL at 09:06

## 2017-06-23 RX ADMIN — BUPIVACAINE 20 ML: 13.3 INJECTION, SUSPENSION, LIPOSOMAL INFILTRATION at 09:06

## 2017-06-23 RX ADMIN — OXYCODONE HYDROCHLORIDE 10 MG: 5 TABLET ORAL at 10:06

## 2017-06-23 RX ADMIN — FENTANYL CITRATE 50 MCG: 50 INJECTION, SOLUTION INTRAMUSCULAR; INTRAVENOUS at 09:06

## 2017-06-23 RX ADMIN — FAMOTIDINE 20 MG: 10 INJECTION, SOLUTION INTRAVENOUS at 10:06

## 2017-06-23 RX ADMIN — ACETAMINOPHEN 1000 MG: 10 INJECTION, SOLUTION INTRAVENOUS at 12:06

## 2017-06-23 RX ADMIN — PRAVASTATIN SODIUM 80 MG: 40 TABLET ORAL at 10:06

## 2017-06-23 RX ADMIN — STANDARDIZED SENNA CONCENTRATE AND DOCUSATE SODIUM 1 TABLET: 8.6; 5 TABLET, FILM COATED ORAL at 09:06

## 2017-06-23 RX ADMIN — CEFTRIAXONE 2 G: 2 INJECTION, SOLUTION INTRAVENOUS at 08:06

## 2017-06-23 RX ADMIN — LIDOCAINE HYDROCHLORIDE 0.2 MG: 10 INJECTION, SOLUTION EPIDURAL; INFILTRATION; INTRACAUDAL; PERINEURAL at 08:06

## 2017-06-23 RX ADMIN — ACETAMINOPHEN 1000 MG: 10 INJECTION, SOLUTION INTRAVENOUS at 08:06

## 2017-06-23 RX ADMIN — PROPOFOL 200 MG: 10 INJECTION, EMULSION INTRAVENOUS at 08:06

## 2017-06-23 RX ADMIN — MIDAZOLAM HYDROCHLORIDE 2 MG: 1 INJECTION, SOLUTION INTRAMUSCULAR; INTRAVENOUS at 08:06

## 2017-06-23 RX ADMIN — SODIUM CHLORIDE: 0.9 INJECTION, SOLUTION INTRAVENOUS at 10:06

## 2017-06-23 RX ADMIN — FENTANYL CITRATE 25 MCG: 50 INJECTION, SOLUTION INTRAMUSCULAR; INTRAVENOUS at 09:06

## 2017-06-23 NOTE — NURSING TRANSFER
Nursing Transfer Note      6/23/2017     Transfer From: PACU    Transfer via stretcher    Transfer with N/A    Transported by Transport    Medicines sent: No    Chart send with patient: Yes    Notified: spouse    Patient reassessed at: 6/23/17 at 1200    Upon arrival to floor: patient oriented to room, call bell in reach and bed in lowest position

## 2017-06-23 NOTE — TRANSFER OF CARE
"Anesthesia Transfer of Care Note    Patient: Murphy Angel    Procedure(s) Performed: Procedure(s) (LRB):  CLOSURE-ILEOSTOMY (N/A)    Patient location: PACU    Anesthesia Type: general    Transport from OR: Transported from OR on 100% O2 by closed face mask with adequate spontaneous ventilation    Post pain: adequate analgesia    Post assessment: no apparent anesthetic complications    Post vital signs: stable    Level of consciousness: sedated    Nausea/Vomiting: no nausea/vomiting    Complications: none    Transfer of care protocol was followed      Last vitals:   Visit Vitals  BP (!) 143/82   Pulse 67   Temp 36.6 °C (97.9 °F) (Temporal)   Resp 12   Ht 5' 10" (1.778 m)   Wt 90.7 kg (200 lb)   SpO2 100%   BMI 28.70 kg/m²     "

## 2017-06-23 NOTE — ANESTHESIA POSTPROCEDURE EVALUATION
"Anesthesia Post Evaluation    Patient: Murphy Angel    Procedure(s) Performed: Procedure(s) (LRB):  CLOSURE-ILEOSTOMY (N/A)    Final Anesthesia Type: general  Patient location during evaluation: PACU  Patient participation: Yes- Able to Participate  Level of consciousness: awake and alert  Post-procedure vital signs: reviewed and stable  Pain management: adequate  Airway patency: patent  PONV status at discharge: No PONV  Anesthetic complications: no      Cardiovascular status: blood pressure returned to baseline  Respiratory status: unassisted, spontaneous ventilation and room air  Hydration status: euvolemic  Follow-up not needed.        Visit Vitals  BP (!) 156/108   Pulse 71   Temp 36.6 °C (97.9 °F) (Temporal)   Resp 12   Ht 5' 10" (1.778 m)   Wt 90.7 kg (200 lb)   SpO2 99%   BMI 28.70 kg/m²       Pain/Conrad Score: Pain Assessment Performed: Yes (6/23/2017  9:35 AM)  Presence of Pain: non-verbal indicators absent (6/23/2017  9:35 AM)  Pain Rating Prior to Med Admin: 8 (6/23/2017 10:30 AM)      "

## 2017-06-23 NOTE — BRIEF OP NOTE
[facility]  Colon and Rectal Surgery  Operative Note    SUMMARY     Date of Procedure: 6/23/2017     Procedure: Procedure(s) (LRB):  CLOSURE-ILEOSTOMY (N/A)     Surgeon(s) and Role:     * Nacho Dominguez MD - Primary     * Khalida Livingston MD - Resident - Assisting        Pre-Operative Diagnosis: Ileostomy in place [Z93.2]  History of rectal cancer [Z85.048]    Post-Operative Diagnosis: Post-Op Diagnosis Codes:     * Ileostomy in place [Z93.2]     * History of rectal cancer [Z85.048]    Anesthesia: General, Exparel 266 mg, Marcaine 0.25% (75 mg) preperironeal injection      Technical Procedures Used: STSFETE LC 75*    Description of the Findings of the Procedure:mod adhesions  Significant Surgical Tasks Conducted by the Assistant(s), if Applicable: n/a  Complications: No    Estimated Blood Loss (EBL): * No values recorded between 6/23/2017  8:54 AM and 6/23/2017  9:25 AM *           Implants: * No implants in log *    Specimens:   Specimen (12h ago through future)    None           Condition: Good    Disposition: PACU - hemodynamically stable.    Attestation: I was present and scrubbed for the entire procedure.

## 2017-06-23 NOTE — NURSING TRANSFER
Nursing Transfer Note      6/23/2017     Transfer To: 647 From PACU    Transfer via stretcher    Transfer with IV pump    Transported by PCT    Medicines sent: none    Chart send with patient: Yes    Notified: spouse    Patient reassessed at: 6/23/17 @ 1130     Upon arrival to floor:

## 2017-06-23 NOTE — INTERVAL H&P NOTE
The patient has been examined and the H&P has been reviewed:    I concur with the findings and no changes have occurred since H&P was written.    Anesthesia/Surgery risks, benefits and alternative options discussed and understood by patient/family.          Active Hospital Problems    Diagnosis  POA    Ileostomy in place [Z93.2]  Not Applicable      Resolved Hospital Problems    Diagnosis Date Resolved POA   No resolved problems to display.

## 2017-06-23 NOTE — ANESTHESIA RELEASE NOTE
"Anesthesia Release from PACU Note    Patient: Murphy Angel    Procedure(s) Performed: Procedure(s) (LRB):  CLOSURE-ILEOSTOMY (N/A)    Anesthesia type: general    Post pain: Adequate analgesia    Post assessment: no apparent anesthetic complications and tolerated procedure well    Last Vitals:   Visit Vitals  BP (!) 156/108   Pulse 71   Temp 36.6 °C (97.9 °F) (Temporal)   Resp 12   Ht 5' 10" (1.778 m)   Wt 90.7 kg (200 lb)   SpO2 99%   BMI 28.70 kg/m²       Post vital signs: stable    Level of consciousness: awake and alert     Nausea/Vomiting: no nausea/no vomiting    Complications: none    Airway Patency: patent    Respiratory: unassisted, spontaneous ventilation, room air    Cardiovascular: stable and blood pressure at baseline    Hydration: euvolemic  "

## 2017-06-23 NOTE — ANESTHESIA PREPROCEDURE EVALUATION
06/23/2017  Murphy Angel is a 56 y.o., male.    Anesthesia Evaluation    I have reviewed the Patient Summary Reports.    I have reviewed the Nursing Notes.   I have reviewed the Medications.     Review of Systems  Anesthesia Hx:  No problems with previous Anesthesia    Social:  Smoker    Hematology/Oncology:  Hematology Normal      Current/Recent Cancer. surgery Oncology Comments: Rectal CA     EENT/Dental:EENT/Dental Normal   Pulmonary:   Sleep Apnea, CPAP    Renal/:  Renal/ Normal     Hepatic/GI:  Hepatic/GI Normal    Neurological:  Neurology Normal    Endocrine:   Hypothyroidism        Physical Exam  General:  Well nourished    Airway/Jaw/Neck:  Airway Findings: Mouth Opening: Normal Tongue: Normal  Mallampati: II      Dental:  Dental Findings: In tact   Chest/Lungs:  Chest/Lungs Findings: Clear to auscultation, Normal Respiratory Rate     Heart/Vascular:  Heart Findings: Rate: Normal  Rhythm: Regular Rhythm        Mental Status:  Mental Status Findings:  Cooperative, Alert and Oriented         Anesthesia Plan  Type of Anesthesia, risks & benefits discussed:  Anesthesia Type:  general  Patient's Preference:   Intra-op Monitoring Plan:   Intra-op Monitoring Plan Comments:   Post Op Pain Control Plan:   Post Op Pain Control Plan Comments:   Induction:   IV  Beta Blocker:  Patient is not currently on a Beta-Blocker (No further documentation required).       Informed Consent: Patient understands risks and agrees with Anesthesia plan.  Questions answered. Anesthesia consent signed with patient.  ASA Score: 2     Day of Surgery Review of History & Physical:    H&P update referred to the surgeon.         Ready For Surgery From Anesthesia Perspective.

## 2017-06-23 NOTE — PLAN OF CARE
Problem: Patient Care Overview  Goal: Plan of Care Review  Outcome: Ongoing (interventions implemented as appropriate)  Plan of care reviewed with patient and spouse, verbalizes understanding. AAOx4, VSS. Dressing to RLQ with gauze and Tegaderm CDI. Patient tolerating clear liquids, reports no N/V. Patient reports pain as well controlled. Patient ambulated X1, remains free of any falls/trauma. Patient states no other needs at this time, call light in reach, WCTM.

## 2017-06-24 LAB
ANION GAP SERPL CALC-SCNC: 8 MMOL/L
BASOPHILS # BLD AUTO: 0.01 K/UL
BASOPHILS NFR BLD: 0.1 %
BUN SERPL-MCNC: 8 MG/DL
CALCIUM SERPL-MCNC: 8.7 MG/DL
CHLORIDE SERPL-SCNC: 104 MMOL/L
CO2 SERPL-SCNC: 27 MMOL/L
CREAT SERPL-MCNC: 0.8 MG/DL
DIFFERENTIAL METHOD: ABNORMAL
EOSINOPHIL # BLD AUTO: 0.2 K/UL
EOSINOPHIL NFR BLD: 2.5 %
ERYTHROCYTE [DISTWIDTH] IN BLOOD BY AUTOMATED COUNT: 12.3 %
EST. GFR  (AFRICAN AMERICAN): >60 ML/MIN/1.73 M^2
EST. GFR  (NON AFRICAN AMERICAN): >60 ML/MIN/1.73 M^2
GLUCOSE SERPL-MCNC: 91 MG/DL
HCT VFR BLD AUTO: 37.6 %
HGB BLD-MCNC: 12.6 G/DL
LYMPHOCYTES # BLD AUTO: 0.8 K/UL
LYMPHOCYTES NFR BLD: 12.3 %
MAGNESIUM SERPL-MCNC: 1.8 MG/DL
MCH RBC QN AUTO: 30.3 PG
MCHC RBC AUTO-ENTMCNC: 33.5 %
MCV RBC AUTO: 90 FL
MONOCYTES # BLD AUTO: 0.7 K/UL
MONOCYTES NFR BLD: 9.8 %
NEUTROPHILS # BLD AUTO: 5.1 K/UL
NEUTROPHILS NFR BLD: 75.2 %
PLATELET # BLD AUTO: 216 K/UL
PMV BLD AUTO: 9.4 FL
POTASSIUM SERPL-SCNC: 3.9 MMOL/L
RBC # BLD AUTO: 4.16 M/UL
SODIUM SERPL-SCNC: 139 MMOL/L
WBC # BLD AUTO: 6.73 K/UL

## 2017-06-24 PROCEDURE — 85025 COMPLETE CBC W/AUTO DIFF WBC: CPT

## 2017-06-24 PROCEDURE — 97165 OT EVAL LOW COMPLEX 30 MIN: CPT

## 2017-06-24 PROCEDURE — 63600175 PHARM REV CODE 636 W HCPCS: Performed by: ANESTHESIOLOGY

## 2017-06-24 PROCEDURE — 63600175 PHARM REV CODE 636 W HCPCS: Performed by: SURGERY

## 2017-06-24 PROCEDURE — 83735 ASSAY OF MAGNESIUM: CPT

## 2017-06-24 PROCEDURE — 25000003 PHARM REV CODE 250: Performed by: ANESTHESIOLOGY

## 2017-06-24 PROCEDURE — 25000003 PHARM REV CODE 250: Performed by: SURGERY

## 2017-06-24 PROCEDURE — 25000003 PHARM REV CODE 250: Performed by: COLON & RECTAL SURGERY

## 2017-06-24 PROCEDURE — 80048 BASIC METABOLIC PNL TOTAL CA: CPT

## 2017-06-24 PROCEDURE — 36415 COLL VENOUS BLD VENIPUNCTURE: CPT

## 2017-06-24 PROCEDURE — 20600001 HC STEP DOWN PRIVATE ROOM

## 2017-06-24 RX ORDER — POTASSIUM CHLORIDE 20 MEQ/1
20 TABLET, EXTENDED RELEASE ORAL ONCE
Status: COMPLETED | OUTPATIENT
Start: 2017-06-24 | End: 2017-06-24

## 2017-06-24 RX ORDER — LANOLIN ALCOHOL/MO/W.PET/CERES
800 CREAM (GRAM) TOPICAL ONCE
Status: COMPLETED | OUTPATIENT
Start: 2017-06-24 | End: 2017-06-24

## 2017-06-24 RX ORDER — FAMOTIDINE 20 MG/1
20 TABLET, FILM COATED ORAL 2 TIMES DAILY
Status: DISCONTINUED | OUTPATIENT
Start: 2017-06-24 | End: 2017-06-25 | Stop reason: HOSPADM

## 2017-06-24 RX ORDER — DIPHENHYDRAMINE HYDROCHLORIDE 50 MG/ML
25 INJECTION INTRAMUSCULAR; INTRAVENOUS ONCE
Status: COMPLETED | OUTPATIENT
Start: 2017-06-24 | End: 2017-06-24

## 2017-06-24 RX ORDER — HYDROMORPHONE HYDROCHLORIDE 1 MG/ML
0.5 INJECTION, SOLUTION INTRAMUSCULAR; INTRAVENOUS; SUBCUTANEOUS
Status: DISCONTINUED | OUTPATIENT
Start: 2017-06-24 | End: 2017-06-25 | Stop reason: HOSPADM

## 2017-06-24 RX ADMIN — LEVOTHYROXINE SODIUM 50 MCG: 25 TABLET ORAL at 08:06

## 2017-06-24 RX ADMIN — MAGNESIUM OXIDE TAB 400 MG (241.3 MG ELEMENTAL MG) 800 MG: 400 (241.3 MG) TAB at 01:06

## 2017-06-24 RX ADMIN — OXYCODONE HYDROCHLORIDE 10 MG: 5 TABLET ORAL at 09:06

## 2017-06-24 RX ADMIN — POTASSIUM CHLORIDE 20 MEQ: 1500 TABLET, EXTENDED RELEASE ORAL at 01:06

## 2017-06-24 RX ADMIN — HYDROMORPHONE HYDROCHLORIDE 0.5 MG: 1 INJECTION, SOLUTION INTRAMUSCULAR; INTRAVENOUS; SUBCUTANEOUS at 06:06

## 2017-06-24 RX ADMIN — OXYCODONE HYDROCHLORIDE 10 MG: 5 TABLET ORAL at 03:06

## 2017-06-24 RX ADMIN — PRAVASTATIN SODIUM 80 MG: 40 TABLET ORAL at 08:06

## 2017-06-24 RX ADMIN — OXYCODONE HYDROCHLORIDE 10 MG: 5 TABLET ORAL at 07:06

## 2017-06-24 RX ADMIN — HYDROMORPHONE HYDROCHLORIDE 0.5 MG: 1 INJECTION, SOLUTION INTRAMUSCULAR; INTRAVENOUS; SUBCUTANEOUS at 12:06

## 2017-06-24 RX ADMIN — ACETAMINOPHEN 1000 MG: 10 INJECTION, SOLUTION INTRAVENOUS at 12:06

## 2017-06-24 RX ADMIN — FAMOTIDINE 20 MG: 20 TABLET, FILM COATED ORAL at 09:06

## 2017-06-24 RX ADMIN — OXYCODONE HYDROCHLORIDE 10 MG: 5 TABLET ORAL at 11:06

## 2017-06-24 RX ADMIN — DIPHENHYDRAMINE HYDROCHLORIDE 25 MG: 50 INJECTION, SOLUTION INTRAMUSCULAR; INTRAVENOUS at 12:06

## 2017-06-24 RX ADMIN — STANDARDIZED SENNA CONCENTRATE AND DOCUSATE SODIUM 1 TABLET: 8.6; 5 TABLET, FILM COATED ORAL at 08:06

## 2017-06-24 RX ADMIN — SODIUM CHLORIDE: 0.9 INJECTION, SOLUTION INTRAVENOUS at 05:06

## 2017-06-24 RX ADMIN — FAMOTIDINE 20 MG: 10 INJECTION, SOLUTION INTRAVENOUS at 08:06

## 2017-06-24 RX ADMIN — DIPHENHYDRAMINE HYDROCHLORIDE 12.5 MG: 50 INJECTION, SOLUTION INTRAMUSCULAR; INTRAVENOUS at 09:06

## 2017-06-24 NOTE — PLAN OF CARE
Problem: Occupational Therapy Goal  Goal: Occupational Therapy Goal  OT initial eval completed and no goals set 2* high (I).  Outcome: Outcome(s) achieved Date Met: 06/24/17  Eval completed and no goals set 2* high (I).

## 2017-06-24 NOTE — PLAN OF CARE
Problem: Patient Care Overview  Goal: Plan of Care Review  Outcome: Ongoing (interventions implemented as appropriate)  Plan of care reviewed with patient. Patient verbalized understanding. Patient is oriented x4. Patient received prn pain meds per mar. Patient received prn benadryl for sleep. Patient voided in urinal. Patient ambulated adlib. Patient remained free of any falls or acute events, VSS, Will continue to monitor.

## 2017-06-24 NOTE — PROGRESS NOTES
Pharmacist Intervention IV to PO Note    Murphy Angel is a 56 y.o. male being treated with IV medication famotidine    Patient Data:    Vital Signs (Most Recent):  Temp: 98.4 °F (36.9 °C) (06/24/17 0731)  Pulse: 67 (06/24/17 0731)  Resp: 18 (06/24/17 0731)  BP: (!) 147/93 (06/24/17 0731)  SpO2: 96 % (06/24/17 0731)   Vital Signs (72h Range):  Temp:  [97 °F (36.1 °C)-98.4 °F (36.9 °C)]   Pulse:  []   Resp:  [8-25]   BP: (119-156)/()   SpO2:  [92 %-100 %]      CBC:    Recent Labs     Lab 06/24/17  0640   WBC 6.73   RBC 4.16*   HGB 12.6*   HCT 37.6*      MCV 90   MCH 30.3   MCHC 33.5     CMP:     Recent Labs     Lab 06/24/17  0640   GLU 91   CALCIUM 8.7      K 3.9   CO2 27      BUN 8   CREATININE 0.8       Dietary Orders:  Diet Orders            Diet low fiber/residue: Low Fiber/Residue starting at 06/24 0856            Based on the following criteria, this patient qualifies for intravenous to oral conversion:  [x] The patients gastrointestinal tract is functioning (tolerating medications via oral or enteral route for 24 hours and tolerating food or enteral feeds for 24 hours).  [x] The patient is hemodynamically stable for 24 hours (heart rate <100 beats per minute, systolic blood pressure >99 mm Hg, and respiratory rate <20 breaths per minute).  [x] The patient shows clinical improvement (afebrile for at least 24 hours and white blood cell count downtrending or normalized). Additionally, the patient must be non-neutropenic (absolute neutrophil count >500 cells/mm3).  [x] For antimicrobials, the patient has received IV therapy for at least 24 hours.    IV medication famotidine 20 mg every 12 hours will be changed to oral medication famotidine 20 mg every 12 hours    Pharmacist's Name: Tracey Mays  Pharmacist's Extension: [phone]

## 2017-06-24 NOTE — PLAN OF CARE
Problem: Patient Care Overview  Goal: Plan of Care Review  Outcome: Ongoing (interventions implemented as appropriate)  Plan of care discussed with pt. Pt verbalizes understanding. Pt tolerating low fiber diet with no complaints of discomfort or nausea. Pain managed with PRN oxy IR. Pt up ad rose. Pt positions independently. Vital signs stable on RA. Pt remains free of falls and injury. Will continue to monitor.

## 2017-06-24 NOTE — PROGRESS NOTES
"GENERAL SURGERY   PROGRESS NOTE    Hospital Day Hospital Day: 2  Post-Op Day 1 Day Post-Op    SUBJECTIVE:    Admit Diagnosis: Ileostomy in place, Procedure(s) (LRB):  CLOSURE-ILEOSTOMY (N/A)    Additional Problems:   Patient Active Problem List    Diagnosis Date Noted    LAR loop ileostomy 5/2017, s/p ileostomy closure 6/23 05/31/2017    History of rectal cancer 05/31/2017       Interval History: POD 1 s/p ileostomy closure. No AEON.   Pain adequately controlled, rhianna CLD w/o N/V, + flatus, + liquid BM    ROS: Pt denies fevers or chills, chest pain or shortness of breath, nausea or vomiting, constipation or diarrhea.    Scheduled Meds:   famotidine  20 mg Oral BID    levothyroxine  50 mcg Oral Daily    pravastatin  80 mg Oral Daily    senna-docusate 8.6-50 mg  1 tablet Oral BID     Continuous Infusions:   sodium chloride 0.9% 125 mL/hr at 06/24/17 0516     PRN Meds:  diphenhydrAMINE, HYDROmorphone, ondansetron, oxycodone, oxycodone, promethazine (PHENERGAN) IVPB    OBJECTIVE:    Temp:  [97 °F (36.1 °C)-98.4 °F (36.9 °C)] 97.3 °F (36.3 °C)  Pulse:  [67-77] 77  Resp:  [17-18] 18  SpO2:  [92 %-99 %] 94 %  BP: (135-156)/(80-99) 145/90  BP (!) 145/90 (BP Location: Left arm, Patient Position: Lying, BP Method: Automatic)   Pulse 77   Temp 97.3 °F (36.3 °C) (Oral)   Resp 18   Ht 5' 10" (1.778 m)   Wt 90.7 kg (200 lb)   SpO2 (!) 94%   BMI 28.70 kg/m²       Intake/Output Summary (Last 24 hours) at 06/24/17 1214  Last data filed at 06/24/17 0732   Gross per 24 hour   Intake          3391.25 ml   Output             2880 ml   Net           511.25 ml       General Appearance:    Alert, resting comfortably, no distress   HEENT:    PERRL, EOMI, MMM   Lungs:     Respirations unlabored   Heart:    Regular rate    Abdomen:     Soft, appropriately TTP, ND   Extremities:   No edema   Drains:    none   Wounds:   Midline incision C/d/i, prior ileostomy site with soaked dressing     Labs:  Lab Results   Component Value Date "    WBC 6.73 06/24/2017    HGB 12.6 (L) 06/24/2017    HCT 37.6 (L) 06/24/2017    MCV 90 06/24/2017     06/24/2017     BMP    Recent Labs  Lab 06/24/17  0640   GLU 91      K 3.9      CO2 27   BUN 8   CREATININE 0.8   CALCIUM 8.7   MG 1.8     Lab Results   Component Value Date    CALCIUM 8.7 06/24/2017    PHOS 3.5 05/21/2017       Imaging/Procedures: NONE      ASSESSMENT/PLAN:    Murphy Angel is a 56 y.o. male POD # 1 Day Post-Op s/p Procedure(s) (LRB):  CLOSURE-ILEOSTOMY (N/A) for Ileostomy in place    -Encourage OOB, ambulation, IS.  -Prophylaxis: SCDs  -SLIV  -advance to low residue diet  -cont current pain regimen  -hypomagnesia and hypokalemia - replaced aries Booker MD  PGY-1  CR Surgery

## 2017-06-24 NOTE — PT/OT/SLP EVAL
"Occupational Therapy  Evaluation and Discharge    Murphy Angel   MRN: [id number]   Admitting Diagnosis: Ileostomy in place    OT Date of Treatment: 06/24/17   OT Start Time: 0921  OT Stop Time: 0934  OT Total Time (min): 13 min    Billable Minutes:  Evaluation 13    Diagnosis: Ileostomy in place   S/p ileostomy closure    Past Medical History:   Diagnosis Date    Cancer     Thyroid disease       Past Surgical History:   Procedure Laterality Date    EYE SURGERY      illeostomy         Referring physician: Khalida Livingston MD  Date referred to OT: 6/23/17    General Precautions: Standard, fall  Orthopedic Precautions: N/A  Braces: N/A    Do you have any cultural, spiritual, Buddhism conflicts, given your current situation?: No     Patient History:  Living Environment  Living Environment Comment: Pt lives with wife in a H with 1 step inside to enter kitchen and 0 LUZMARIA. PTA, pt reports (I) with functional mobility and self-care tasks. Pt reports receiving home health 1-2 times per week since Feb. Pt will have assistance as needed upon DC.   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  "I just went to the bathroom" "I don't have a problem with any of that" -pt stated  Chief Complaint: Pain and inability to sleep  Patient/Family stated goals: return home    Pain/Comfort  Pain Rating 1: 7/10  Location - Side 1: Bilateral  Location - " Orientation 1: lower  Location 1: abdomen  Pain Addressed 1: Reposition, Distraction  Pain Rating Post-Intervention 1: 7/10    Objective:  Patient found with: peripheral IV    Cognitive Exam:  Oriented to: Person, Place, Time and Situation  Follows Commands/attention: Follows multistep  commands  Communication: clear/fluent  Memory:  No Deficits noted  Safety awareness/insight to disability: intact  Coping skills/emotional control: Appropriate to situation    Visual/perceptual:  Intact    Physical Exam:  Postural examination/scapula alignment: No postural abnormalities identified  Skin integrity: Visible skin intact  Edema: None noted     Sensation:   Intact - pt reported numbness in L index finger from previous injury    Upper Extremity Range of Motion:  Right Upper Extremity: WFL  Left Upper Extremity: WFL    Upper Extremity Strength:  Right Upper Extremity: WFL  Left Upper Extremity: WFL   Strength: WFL    Fine motor coordination:   Intact    Gross motor coordination: WFL    Functional Mobility:  Bed Mobility:  Rolling/Turning Right: Modified independent (HOB flat)  Scooting/Bridging: Independent (to EOB)  Supine to Sit: Independent (HOB flat)  Sit to Supine: Modified Independent (HOB flat; with side rail)    Transfers:  Sit <> Stand Assistance: Independent (EOB)  Sit <> Stand Assistive Device: No Assistive Device  Toilet Transfer Assistance: Activity Did not Occur (Pt refused to complete toilet t/f reporting he had just used the bathroom without any difficulty.)    Functional Ambulation: Pt performed 3-4 steps within room but reported ambulating to bathroom just prior to session and several times yesterday without difficulty.     Activities of Daily Living:     UE Dressing Level of Assistance: Minimum assistance (Min A required to don gown like robe while seated EOB 2* line management)  LE Dressing Level of Assistance: Independent (Doffed/donned socks at EOB )    Therapeutic Activities and Exercises:  -Pt  "educated on OT role and POC.   -OT addressed pt's concerns about returning home and educated pt on how to request OT services if there is a change in functional status.   -Pt encouraged to participate in OOB ax later this date.   -Pt whiteboard updated.      AM-PAC 6 CLICK ADL  How much help from another person does this patient currently need?  1 = Unable, Total/Dependent Assistance  2 = A lot, Maximum/Moderate Assistance  3 = A little, Minimum/Contact Guard/Supervision  4 = None, Modified [place]/Independent    Putting on and taking off regular lower body clothing? : 4  Bathing (including washing, rinsing, drying)?: 4  Toileting, which includes using toilet, bedpan, or urinal? : 4  Putting on and taking off regular upper body clothing?: 4  Taking care of personal grooming such as brushing teeth?: 4  Eating meals?: 4  Total Score: 24    AM-PAC Raw Score CMS "G-Code Modifier Level of Impairment Assistance   6 % Total / Unable   7 - 9 CM 80 - 100% Maximal Assist   10-14 CL 60 - 80% Moderate Assist   15 - 19 CK 40 - 60% Moderate Assist   20 - 22 CJ 20 - 40% Minimal Assist   23 CI 1-20% SBA / CGA   24 CH 0% Independent/ Mod I       Patient left supine with all lines intact, call button in reach and RN notified    Assessment:  Murphy Angel is a 56 y.o. male with a medical diagnosis of Ileostomy in place. Pt tolerated OT eval well and put forth good effort to participate. Pt performed all self-care and functional mobility tasks without physical A. Pt reported performing ADL tasks several times this date with no noted difficulty and reports performing at or close to PLOF. OT addressed concerns for returning home and educated pt on how to request OT services if there is a change in function. Pt declined participation in continued OOB ax stating, "I didn't get much sleep last night" but anticipates sitting in chair later this AM. Pt will be DC'd from OT services at this time due to high (I) and achieving " functional baseline.     Rehab identified problem list/impairments: Rehab identified problem list/impairments:  (no deficits noted)    Rehab potential is good.    Activity tolerance: Good    Discharge recommendations: Discharge Facility/Level Of Care Needs: home     Barriers to discharge: Barriers to Discharge: None    Equipment recommendations: none     GOALS:    Occupational Therapy Goals     Not on file          Multidisciplinary Problems (Resolved)        Problem: Occupational Therapy Goal    Goal Priority Disciplines Outcome Interventions   Occupational Therapy Goal   (Resolved)     OT, PT/OT Outcome(s) achieved    Description:  OT initial eval completed and no goals set 2* high (I).                    PLAN:    Pt DC'd from acute OT at this time.   Plan of Care reviewed with: patient       Karie JOYCELYN Zaman  06/24/2017

## 2017-06-25 VITALS
HEIGHT: 70 IN | RESPIRATION RATE: 18 BRPM | DIASTOLIC BLOOD PRESSURE: 81 MMHG | OXYGEN SATURATION: 96 % | WEIGHT: 200 LBS | TEMPERATURE: 99 F | BODY MASS INDEX: 28.63 KG/M2 | HEART RATE: 87 BPM | SYSTOLIC BLOOD PRESSURE: 129 MMHG

## 2017-06-25 LAB
ANION GAP SERPL CALC-SCNC: 8 MMOL/L
BASOPHILS # BLD AUTO: 0.01 K/UL
BASOPHILS NFR BLD: 0.1 %
BUN SERPL-MCNC: 8 MG/DL
CALCIUM SERPL-MCNC: 9.2 MG/DL
CHLORIDE SERPL-SCNC: 102 MMOL/L
CO2 SERPL-SCNC: 27 MMOL/L
CREAT SERPL-MCNC: 0.8 MG/DL
DIFFERENTIAL METHOD: ABNORMAL
EOSINOPHIL # BLD AUTO: 0.2 K/UL
EOSINOPHIL NFR BLD: 3 %
ERYTHROCYTE [DISTWIDTH] IN BLOOD BY AUTOMATED COUNT: 12 %
EST. GFR  (AFRICAN AMERICAN): >60 ML/MIN/1.73 M^2
EST. GFR  (NON AFRICAN AMERICAN): >60 ML/MIN/1.73 M^2
GLUCOSE SERPL-MCNC: 108 MG/DL
HCT VFR BLD AUTO: 37.2 %
HGB BLD-MCNC: 12.8 G/DL
LYMPHOCYTES # BLD AUTO: 0.8 K/UL
LYMPHOCYTES NFR BLD: 10.2 %
MAGNESIUM SERPL-MCNC: 2.2 MG/DL
MCH RBC QN AUTO: 30.5 PG
MCHC RBC AUTO-ENTMCNC: 34.4 %
MCV RBC AUTO: 89 FL
MONOCYTES # BLD AUTO: 1.1 K/UL
MONOCYTES NFR BLD: 14.3 %
NEUTROPHILS # BLD AUTO: 5.5 K/UL
NEUTROPHILS NFR BLD: 72.1 %
PLATELET # BLD AUTO: 237 K/UL
PMV BLD AUTO: 9.4 FL
POTASSIUM SERPL-SCNC: 3.9 MMOL/L
RBC # BLD AUTO: 4.19 M/UL
SODIUM SERPL-SCNC: 137 MMOL/L
WBC # BLD AUTO: 7.68 K/UL

## 2017-06-25 PROCEDURE — 36415 COLL VENOUS BLD VENIPUNCTURE: CPT

## 2017-06-25 PROCEDURE — 83735 ASSAY OF MAGNESIUM: CPT

## 2017-06-25 PROCEDURE — 25000003 PHARM REV CODE 250: Performed by: COLON & RECTAL SURGERY

## 2017-06-25 PROCEDURE — 80048 BASIC METABOLIC PNL TOTAL CA: CPT

## 2017-06-25 PROCEDURE — 85025 COMPLETE CBC W/AUTO DIFF WBC: CPT

## 2017-06-25 PROCEDURE — 25000003 PHARM REV CODE 250: Performed by: SURGERY

## 2017-06-25 RX ORDER — POLYETHYLENE GLYCOL 3350 17 G/17G
17 POWDER, FOR SOLUTION ORAL DAILY
Qty: 527 G | Refills: 0 | Status: SHIPPED | OUTPATIENT
Start: 2017-06-25 | End: 2017-07-26

## 2017-06-25 RX ORDER — OXYCODONE AND ACETAMINOPHEN 5; 325 MG/1; MG/1
1 TABLET ORAL EVERY 4 HOURS PRN
Qty: 41 TABLET | Refills: 0 | Status: SHIPPED | OUTPATIENT
Start: 2017-06-25 | End: 2018-02-07

## 2017-06-25 RX ADMIN — FAMOTIDINE 20 MG: 20 TABLET, FILM COATED ORAL at 08:06

## 2017-06-25 RX ADMIN — OXYCODONE HYDROCHLORIDE 10 MG: 5 TABLET ORAL at 06:06

## 2017-06-25 RX ADMIN — PRAVASTATIN SODIUM 80 MG: 40 TABLET ORAL at 08:06

## 2017-06-25 RX ADMIN — LEVOTHYROXINE SODIUM 50 MCG: 25 TABLET ORAL at 08:06

## 2017-06-25 RX ADMIN — HYDROMORPHONE HYDROCHLORIDE 0.5 MG: 1 INJECTION, SOLUTION INTRAMUSCULAR; INTRAVENOUS; SUBCUTANEOUS at 08:06

## 2017-06-25 RX ADMIN — OXYCODONE HYDROCHLORIDE 10 MG: 5 TABLET ORAL at 10:06

## 2017-06-25 NOTE — PLAN OF CARE
Problem: Patient Care Overview  Goal: Plan of Care Review  Outcome: Ongoing (interventions implemented as appropriate)  Pt complained of having frequent loose stools and it was causing him some irritation. Pt controlled his pain with PO pain med's. Breathing was regular equal and unlabored bilaterally on room air. The pt did receive Benadryl for use as a sleep aid.

## 2017-06-25 NOTE — PT/OT/SLP PROGRESS
Physical Therapy  No PT Needs    Murphy Angel  MRN: [id number]     PT Evaluate and treat orders received. PT attempted to see pt for Evaluation this date; however, noted pt to be independent and ambulating in room per OT note. PT arrived to pt's room to discuss mobility need; pt verbalized no need for skilled PT services this date as he has been ambulating in the hallway without incident and preparing to D/C home this PM. D/C Acute PT services 6/25/2017. No charges entered for this service.    Jessie Roa, PT, DPT  [phone]  6/25/2017

## 2017-06-25 NOTE — NURSING
Discharge instructions discussed with pt. Verbalizes understanding. Paperwork and prescription given to pt. Medications reconciled. IV D/C'd, catheter intact. Tolerated well. Pt informed that showering is okay, but to not submerge incision. Vital signs stable on RA. Pt to ambulate off unit with family.

## 2017-07-12 ENCOUNTER — OFFICE VISIT (OUTPATIENT)
Dept: SURGERY | Facility: CLINIC | Age: 57
End: 2017-07-12
Payer: COMMERCIAL

## 2017-07-12 VITALS
BODY MASS INDEX: 28 KG/M2 | DIASTOLIC BLOOD PRESSURE: 87 MMHG | WEIGHT: 195.56 LBS | HEIGHT: 70 IN | HEART RATE: 66 BPM | SYSTOLIC BLOOD PRESSURE: 142 MMHG

## 2017-07-12 DIAGNOSIS — Z93.2 ILEOSTOMY IN PLACE: Primary | ICD-10-CM

## 2017-07-12 DIAGNOSIS — Z85.048 HISTORY OF RECTAL CANCER: ICD-10-CM

## 2017-07-12 PROCEDURE — 99024 POSTOP FOLLOW-UP VISIT: CPT | Mod: S$GLB,,, | Performed by: COLON & RECTAL SURGERY

## 2017-07-12 PROCEDURE — 99999 PR PBB SHADOW E&M-EST. PATIENT-LVL III: CPT | Mod: PBBFAC,,, | Performed by: COLON & RECTAL SURGERY

## 2017-07-12 NOTE — PROGRESS NOTES
Patient ID:  Murphy Angel is a 56 y.o. male     Chief Complaint:   Chief Complaint   Patient presents with    Post-op Evaluation     pt. has been having some rectal bleeding. (light pink blood)        HPI: 6/223/17 ileostomy closure. Small bowel movements with little control. Os on no fiber      Pouchagram 2/22/17 no leak    5/15/17 LAR and DLI   Path : y pT3 pN0 pMX     Will completed chemoradiotherapy April 4    c-scope 1/31/17 for rectal bleeding founf to have mod diff adenocarcinoma in low rectum. Neg scans has seen XRT in BR.   CT 2/21/17 No evidence of mets mass not seen. MRI scheduled. Saw Dr Camacho who recommended APR. Came to [facility] for second opinion Feb 15, 2017.     ROS:        Constitutional: No fever, chills, activity or appetite change.      HENT: No hearing loss, facial swelling, neck pain or stiffness.       Eyes: No discharge, itching and visual disturbance.      Respiratory: No apnea, cough, choking or shortness of breath.       Cardiovascular: No leg swelling or chest pain      Gastrointestinal: No abdominal distention or change in bowel habbits     Genitourinary: No dysuria, frequency or flank pain.      Musculoskeletal: No arthralgias or gait problem.      Neurological: No dizziness, seizures or weakness.      Hematological: No adenopathy.      Psychiatric/Behavioral: No hallucinations or behavioral problems.       PE:    APPEARANCE: Well nourished, well developed, in no acute distress.   CHEST: Lungs clear. Normal respiratory effort.  CARDIOVASCULAR: Normal rhythm. No edema.  ABDOMEN: Soft. No tenderness or masses.  Rectum:  Normal skin, NST, mild stenosis dilated to little finger  Musculoskeletal: Symmetric, normal range of motion and strength.   Neurological: Alert and oriented to person, place, and time. Normal reflexes.   Skin: Skin is warm and dry.   Psychiatric: Normal mood and affect. Behavior is normal and appropriate.     Impression: Hx rectal cancer,    PLAN:    Ryan and  Miralax. Patient to call me in 3 weeks. RTc 3 months.

## 2017-07-12 NOTE — PATIENT INSTRUCTIONS
Fiber on a daily basis 2 capsules in morning and evening (Metamuci, citrucil or Gummy Fiber) by mouth    Miralax 1 teaspoon in AM    Imodium  in morning and/or evening if seepage occurs

## 2017-08-10 ENCOUNTER — OFFICE VISIT (OUTPATIENT)
Dept: SURGERY | Facility: CLINIC | Age: 57
End: 2017-08-10
Payer: COMMERCIAL

## 2017-08-10 VITALS
WEIGHT: 200.38 LBS | HEART RATE: 74 BPM | DIASTOLIC BLOOD PRESSURE: 95 MMHG | BODY MASS INDEX: 28.69 KG/M2 | SYSTOLIC BLOOD PRESSURE: 155 MMHG | HEIGHT: 70 IN

## 2017-08-10 DIAGNOSIS — K64.8 HEMORRHOIDS, INTERNAL, WITH BLEEDING: ICD-10-CM

## 2017-08-10 PROCEDURE — 99024 POSTOP FOLLOW-UP VISIT: CPT | Mod: S$GLB,,, | Performed by: COLON & RECTAL SURGERY

## 2017-08-10 PROCEDURE — 99999 PR PBB SHADOW E&M-EST. PATIENT-LVL III: CPT | Mod: PBBFAC,,, | Performed by: COLON & RECTAL SURGERY

## 2017-08-10 RX ORDER — CHOLESTYRAMINE 4 G/9G
2 POWDER, FOR SUSPENSION ORAL 2 TIMES DAILY WITH MEALS
Qty: 378 G | Refills: 5 | Status: SHIPPED | OUTPATIENT
Start: 2017-08-10 | End: 2018-06-07

## 2017-08-10 RX ORDER — DIPHENOXYLATE HYDROCHLORIDE AND ATROPINE SULFATE 2.5; .025 MG/1; MG/1
1-2 TABLET ORAL 4 TIMES DAILY PRN
Qty: 120 TABLET | Refills: 3 | Status: SHIPPED | OUTPATIENT
Start: 2017-08-10 | End: 2018-02-23 | Stop reason: SDUPTHER

## 2017-08-10 NOTE — PROGRESS NOTES
Patient ID:  Murphy Angel is a 56 y.o. male     Chief Complaint:   Chief Complaint   Patient presents with    Follow-up        HPI: 6/23/17 ileostomy closure. Small bowel movements with little control. Was on no fiber  Has episides of small frequent stools that burn and are associated with some painless BRBPR> Been using ducolax q 3-4 days. Feels better after cleanout.  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 Neg scans has seen XRT in BR.   CT 2/21/17 No evidence of mets mass not seen. MRI scheduled. Saw Dr Camacho who recommended APR. Came to [facility] for second opinion Feb 15, 2017.     ROS:        Constitutional: No fever, chills, activity or appetite change.      HENT: No hearing loss, facial swelling, neck pain or stiffness.       Eyes: No discharge, itching and visual disturbance.      Respiratory: No apnea, cough, choking or shortness of breath.       Cardiovascular: No leg swelling or chest pain      Gastrointestinal: No abdominal distention or change in bowel habbits     Genitourinary: No dysuria, frequency or flank pain.      Musculoskeletal: No arthralgias or gait problem.      Neurological: No dizziness, seizures or weakness.      Hematological: No adenopathy.      Psychiatric/Behavioral: No hallucinations or behavioral problems.       PE:    APPEARANCE: Well nourished, well developed, in no acute distress.   CHEST: Lungs clear. Normal respiratory effort.  CARDIOVASCULAR: Normal rhythm. No edema.  ABDOMEN: Soft. No tenderness or masses.  Rectum:  Normal skin, NST, mild stenosis dilated to first digit  Anosciopy Grade 2 int hemorrhoids and mild proctitis  Musculoskeletal: Symmetric, normal range of motion and strength.   Neurological: Alert and oriented to person, place, and time. Normal reflexes.   Skin: Skin  is warm and dry.   Psychiatric: Normal mood and affect. Behavior is normal and appropriate.   PROCEDURE:  Anoscopy - Diagnostic - Internal Hemorrhoids    With informed consent IRC was were applied at right posterior and left lateral positions.  The procedure was tolerated well.  The patient was given a handout which discussed their disease process, precautions, and instructions for follow-up and therapy.    IMP:  Internal Hemorrhoids, Symptomatic Hx rectal cancer,    PLAN:    1 metamucil capsule per day. Minimize Ducolax. RTC 3 months.

## 2017-10-12 ENCOUNTER — OFFICE VISIT (OUTPATIENT)
Dept: SURGERY | Facility: CLINIC | Age: 57
End: 2017-10-12
Payer: COMMERCIAL

## 2017-10-12 VITALS
WEIGHT: 203.5 LBS | DIASTOLIC BLOOD PRESSURE: 81 MMHG | HEART RATE: 75 BPM | BODY MASS INDEX: 29.2 KG/M2 | SYSTOLIC BLOOD PRESSURE: 126 MMHG

## 2017-10-12 DIAGNOSIS — Z85.048 HISTORY OF RECTAL CANCER: Primary | ICD-10-CM

## 2017-10-12 PROCEDURE — 99999 PR PBB SHADOW E&M-EST. PATIENT-LVL III: CPT | Mod: PBBFAC,,, | Performed by: COLON & RECTAL SURGERY

## 2017-10-12 PROCEDURE — 99213 OFFICE O/P EST LOW 20 MIN: CPT | Mod: S$GLB,,, | Performed by: COLON & RECTAL SURGERY

## 2017-10-12 NOTE — PROGRESS NOTES
Patient ID:  Murphy Angel is a 57 y.o. male     Chief Complaint:   Chief Complaint   Patient presents with    Follow-up        HPI:  On chemo with variable bowels. Fiber and Miralax in AM.     6/23/17 ileostomy closure.    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 Neg scans has seen XRT in BR.   CT 2/21/17 No evidence of mets mass not seen. MRI scheduled. Saw Dr Camacho who recommended APR. Came to [facility] for second opinion Feb 15, 2017.     ROS:        Constitutional: No fever, chills, activity or appetite change.      HENT: No hearing loss, facial swelling, neck pain or stiffness.       Eyes: No discharge, itching and visual disturbance.      Respiratory: No apnea, cough, choking or shortness of breath.       Cardiovascular: No leg swelling or chest pain      Gastrointestinal: No abdominal distention or change in bowel habbits     Genitourinary: No dysuria, frequency or flank pain.      Musculoskeletal: No arthralgias or gait problem.      Neurological: No dizziness, seizures or weakness.      Hematological: No adenopathy.      Psychiatric/Behavioral: No hallucinations or behavioral problems.       PE:    APPEARANCE: Well nourished, well developed, in no acute distress.   CHEST: Lungs clear. Normal respiratory effort.  CARDIOVASCULAR: Normal rhythm. No edema.  ABDOMEN: Soft. No tenderness or masses.  Rectum:  Normal skin, NST, mild stenosis dilated to first digit  Anosciopy Grade 2 int hemorrhoids and mild proctitis  Musculoskeletal: Symmetric, normal range of motion and strength.   Neurological: Alert and oriented to person, place, and time. Normal reflexes.   Skin: Skin is warm and dry.   Psychiatric: Normal mood and affect. Behavior is normal and appropriate.     IMP:   Hx rectal cancer,    PLAN:  Increase Moralax and fiber. Docipax as needed.  RTC 3 months

## 2018-02-07 ENCOUNTER — OFFICE VISIT (OUTPATIENT)
Dept: SURGERY | Facility: CLINIC | Age: 58
End: 2018-02-07
Payer: COMMERCIAL

## 2018-02-07 VITALS
DIASTOLIC BLOOD PRESSURE: 97 MMHG | WEIGHT: 218.5 LBS | BODY MASS INDEX: 31.28 KG/M2 | HEIGHT: 70 IN | SYSTOLIC BLOOD PRESSURE: 154 MMHG | HEART RATE: 73 BPM

## 2018-02-07 DIAGNOSIS — K59.9 BOWEL DYSFUNCTION: ICD-10-CM

## 2018-02-07 PROCEDURE — 99024 POSTOP FOLLOW-UP VISIT: CPT | Mod: S$GLB,,, | Performed by: COLON & RECTAL SURGERY

## 2018-02-07 PROCEDURE — 99999 PR PBB SHADOW E&M-EST. PATIENT-LVL III: CPT | Mod: PBBFAC,,, | Performed by: COLON & RECTAL SURGERY

## 2018-02-07 NOTE — PROGRESS NOTES
Patient ID:  Murphy Angel is a 57 y.o. male     Chief Complaint:   Chief Complaint   Patient presents with    Follow-up    Rectal Cancer    Hemorrhoids        HPI:  Completed chemo with variable bowels. Small amountg of Fiber and Miralax in AM. Spending most of the day in bathroom small pasty stool takes Ducolax     6/23/17 ileostomy closure.    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 Neg scans has seen XRT in BR.   CT 2/21/17 No evidence of mets mass not seen. MRI scheduled. Saw Dr Camacho who recommended APR. Came to [facility] for second opinion Feb 15, 2017.     ROS:        Constitutional: No fever, chills, activity or appetite change.      HENT: No hearing loss, facial swelling, neck pain or stiffness.       Eyes: No discharge, itching and visual disturbance.      Respiratory: No apnea, cough, choking or shortness of breath.       Cardiovascular: No leg swelling or chest pain      Gastrointestinal: No abdominal distention or change in bowel habbits     Genitourinary: No dysuria, frequency or flank pain.      Musculoskeletal: No arthralgias or gait problem.      Neurological: No dizziness, seizures or weakness.      Hematological: No adenopathy.      Psychiatric/Behavioral: No hallucinations or behavioral problems.       PE:    APPEARANCE: Well nourished, well developed, in no acute distress.   CHEST: Lungs clear. Normal respiratory effort.  CARDIOVASCULAR: Normal rhythm. No edema.  ABDOMEN: Soft. No tenderness or masses.  Rectum:  Normal skin, NST, mild stenosis dilated to first digit  Anosciopy Grade 2 int hemorrhoids and mild proctitis  Musculoskeletal: Symmetric, normal range of motion and strength.   Neurological: Alert and oriented to person, place, and time. Normal reflexes.   Skin: Skin is warm and dry.   Psychiatric: Normal mood and  affect. Behavior is normal and appropriate.     IMP:   Hx rectal cancer,  Low anterior syndroms  PLAN: Bowel regimen. Not ready to return to work for at least 2 months. Adjust fiber, Miralax and Imodium, RTC 6 weeks.

## 2018-02-23 RX ORDER — DIPHENOXYLATE HYDROCHLORIDE AND ATROPINE SULFATE 2.5; .025 MG/1; MG/1
TABLET ORAL
Qty: 120 TABLET | Refills: 3 | Status: ON HOLD | OUTPATIENT
Start: 2018-02-23 | End: 2018-10-18 | Stop reason: HOSPADM

## 2018-03-21 ENCOUNTER — OFFICE VISIT (OUTPATIENT)
Dept: SURGERY | Facility: CLINIC | Age: 58
End: 2018-03-21
Payer: COMMERCIAL

## 2018-03-21 VITALS
HEIGHT: 70 IN | SYSTOLIC BLOOD PRESSURE: 145 MMHG | WEIGHT: 214.31 LBS | BODY MASS INDEX: 30.68 KG/M2 | HEART RATE: 60 BPM | DIASTOLIC BLOOD PRESSURE: 83 MMHG

## 2018-03-21 DIAGNOSIS — K59.9 BOWEL DYSFUNCTION: Primary | ICD-10-CM

## 2018-03-21 DIAGNOSIS — K64.8 HEMORRHOIDS, INTERNAL, WITH BLEEDING: ICD-10-CM

## 2018-03-21 DIAGNOSIS — Z85.048 HISTORY OF RECTAL CANCER: ICD-10-CM

## 2018-03-21 PROCEDURE — 99213 OFFICE O/P EST LOW 20 MIN: CPT | Mod: 25,S$GLB,, | Performed by: COLON & RECTAL SURGERY

## 2018-03-21 PROCEDURE — 46930 DESTROY INTERNAL HEMORRHOIDS: CPT | Mod: S$GLB,,, | Performed by: COLON & RECTAL SURGERY

## 2018-03-21 PROCEDURE — 99999 PR PBB SHADOW E&M-EST. PATIENT-LVL III: CPT | Mod: PBBFAC,,, | Performed by: COLON & RECTAL SURGERY

## 2018-03-21 RX ORDER — MORPHINE SULFATE ORAL SOLUTION 10 MG/5ML
2 SOLUTION ORAL EVERY 6 HOURS PRN
Qty: 100 ML | Refills: 0 | Status: SHIPPED | OUTPATIENT
Start: 2018-03-21 | End: 2018-03-22 | Stop reason: SDUPTHER

## 2018-03-21 NOTE — PROGRESS NOTES
Patient ID:  Murphy Angel is a 57 y.o. male     Chief Complaint:   Chief Complaint   Patient presents with    Follow-up        HPI: Completed chemo with variable bowels. Small amountg of Fiber and Miralax in AM. Spending most of the day in bathroom small pasty stool takes Ducolax. No results with Imodium or Lomotil     6/23/17 ileostomy closure.    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 Neg scans has seen XRT in BR.   CT 2/21/17 No evidence of mets mass not seen. MRI scheduled. Saw Dr Camacho who recommended APR. Came to [facility] for second opinion Feb 15, 2017.     ROS:        Constitutional: No fever, chills, activity or appetite change.      HENT: No hearing loss, facial swelling, neck pain or stiffness.       Eyes: No discharge, itching and visual disturbance.      Respiratory: No apnea, cough, choking or shortness of breath.       Cardiovascular: No leg swelling or chest pain      Gastrointestinal: No abdominal distention or change in bowel habbits     Genitourinary: No dysuria, frequency or flank pain.      Musculoskeletal: No arthralgias or gait problem.      Neurological: No dizziness, seizures or weakness.      Hematological: No adenopathy.      Psychiatric/Behavioral: No hallucinations or behavioral problems.       PE:    APPEARANCE: Well nourished, well developed, in no acute distress.   CHEST: Lungs clear. Normal respiratory effort.  CARDIOVASCULAR: Normal rhythm. No edema.  ABDOMEN: Soft. No tenderness or masses.  Rectum:  Normal skin, NST, mild stenosis dilated to first digit  Anosciopy Grade 2 int hemorrhoids and mild proctitis  Musculoskeletal: Symmetric, normal range of motion and strength.   Neurological: Alert and oriented to person, place, and time. Normal reflexes.   Skin: Skin is warm and dry.   Psychiatric: Normal mood and  affect. Behavior is normal and appropriate.     PROCEDURE:  Anoscopy - Diagnostic - Internal Hemorrhoids    With informed consent IRC was applied at right anterior and left anterior positions.  The procedure was tolerated well.  The patient was given a handout which discussed their disease process, precautions, and instructions for follow-up and therapy.    IMP:  Internal Hemorrhoids, Symptomatic, LAR syndrome    IMP:   Hx rectal cancer,  Low anterior syndroms  PLAN: Bowel regimen.  Try Morphine elixir RTC 1 month

## 2018-03-21 NOTE — LETTER
March 21, 2018      [facility]  [address]  [facility] [address]  Phone: [phone]       Patient: Murphy Angel   YOB: 1960  Date of Visit: 03/21/2018    To Whom It May Concern:    Shola Angel  was at [facility] on 03/21/2018. He may return to work on 05/01/2018 with no restrictions. If you have any questions or concerns, or if I can be of further assistance, please do not hesitate to contact me.    Sincerely,    Nacho Dominguez MD

## 2018-03-22 ENCOUNTER — TELEPHONE (OUTPATIENT)
Dept: SURGERY | Facility: CLINIC | Age: 58
End: 2018-03-22

## 2018-03-22 RX ORDER — MORPHINE SULFATE ORAL SOLUTION 10 MG/5ML
2 SOLUTION ORAL EVERY 6 HOURS PRN
Qty: 100 ML | Refills: 0 | Status: SHIPPED | OUTPATIENT
Start: 2018-03-22 | End: 2018-03-26 | Stop reason: SDUPTHER

## 2018-03-22 NOTE — TELEPHONE ENCOUNTER
----- Message from Michael Landers sent at 3/22/2018  4:37 PM CDT -----  Contact: [facility]:[phone]   .                              Prescription Not In Stock   Name of medication and dose :morphine 10 mg/5 mL solution    Pharmacy:[id number] - [place] - [address] [phone] (Phone)  [phone] (Fax)        Are you completely out of your medication Yes    Additional Information:Pharmacist called and states the pt Rx is not in stock and asked if the Rx could be sent to [facility]  [place], [place] - [phone] (Phone)  [phone] (Fax)

## 2018-03-23 ENCOUNTER — TELEPHONE (OUTPATIENT)
Dept: SURGERY | Facility: CLINIC | Age: 58
End: 2018-03-23

## 2018-03-23 NOTE — TELEPHONE ENCOUNTER
----- Message from Michael Landers sent at 3/23/2018  1:06 PM CDT -----  Contact: Pt:[phone]  Pt called and states both pharmacies don't carry the medication. Pt would like to know if there is a pharmacy that the nurse may know of where the medication may be in stock.

## 2018-03-23 NOTE — TELEPHONE ENCOUNTER
----- Message from Jennie Villarreal sent at 3/23/2018 10:12 AM CDT -----  Contact: pt#[phone]  Pt is returning your call. Please call back

## 2018-03-23 NOTE — TELEPHONE ENCOUNTER
Spoke with patient and instructed him to call other pharmacies to see if they carry morphine.  He will call Monday with pharmacy.

## 2018-03-26 NOTE — TELEPHONE ENCOUNTER
Spoke with pt and he informed me that he found a pharmacy that carries his med. Sent rx request to Dr Dominguez.

## 2018-03-26 NOTE — TELEPHONE ENCOUNTER
----- Message from Leyla Matos MA sent at 3/26/2018 10:17 AM CDT -----  Contact: self  [phone]  States he is returning your call from Friday, 3-23-18.

## 2018-03-27 RX ORDER — MORPHINE SULFATE ORAL SOLUTION 10 MG/5ML
2 SOLUTION ORAL EVERY 6 HOURS PRN
Qty: 100 ML | Refills: 0 | Status: SHIPPED | OUTPATIENT
Start: 2018-03-27 | End: 2018-04-18 | Stop reason: SDUPTHER

## 2018-04-18 ENCOUNTER — OFFICE VISIT (OUTPATIENT)
Dept: SURGERY | Facility: CLINIC | Age: 58
End: 2018-04-18
Payer: COMMERCIAL

## 2018-04-18 VITALS
BODY MASS INDEX: 30.74 KG/M2 | DIASTOLIC BLOOD PRESSURE: 83 MMHG | SYSTOLIC BLOOD PRESSURE: 138 MMHG | WEIGHT: 214.75 LBS | HEART RATE: 62 BPM | HEIGHT: 70 IN

## 2018-04-18 DIAGNOSIS — K59.9 BOWEL DYSFUNCTION: Primary | ICD-10-CM

## 2018-04-18 DIAGNOSIS — Z85.048 HISTORY OF RECTAL CANCER: ICD-10-CM

## 2018-04-18 PROCEDURE — 99999 PR PBB SHADOW E&M-EST. PATIENT-LVL III: CPT | Mod: PBBFAC,,, | Performed by: COLON & RECTAL SURGERY

## 2018-04-18 PROCEDURE — 46930 DESTROY INTERNAL HEMORRHOIDS: CPT | Mod: 79,S$GLB,, | Performed by: COLON & RECTAL SURGERY

## 2018-04-18 RX ORDER — MORPHINE SULFATE ORAL SOLUTION 10 MG/5ML
2 SOLUTION ORAL EVERY 6 HOURS PRN
Qty: 100 ML | Refills: 0 | Status: SHIPPED | OUTPATIENT
Start: 2018-04-18 | End: 2018-05-23 | Stop reason: SDUPTHER

## 2018-04-18 RX ORDER — POLYETHYLENE GLYCOL 3350 17 G/17G
POWDER, FOR SOLUTION ORAL
Status: ON HOLD | COMMUNITY
Start: 2018-02-08 | End: 2018-10-18 | Stop reason: HOSPADM

## 2018-05-23 ENCOUNTER — OFFICE VISIT (OUTPATIENT)
Dept: SURGERY | Facility: CLINIC | Age: 58
End: 2018-05-23
Payer: COMMERCIAL

## 2018-05-23 VITALS
SYSTOLIC BLOOD PRESSURE: 126 MMHG | DIASTOLIC BLOOD PRESSURE: 89 MMHG | WEIGHT: 214.06 LBS | BODY MASS INDEX: 30.65 KG/M2 | HEART RATE: 66 BPM | HEIGHT: 70 IN

## 2018-05-23 DIAGNOSIS — K59.9 BOWEL DYSFUNCTION: Primary | ICD-10-CM

## 2018-05-23 PROCEDURE — 99024 POSTOP FOLLOW-UP VISIT: CPT | Mod: S$GLB,,, | Performed by: COLON & RECTAL SURGERY

## 2018-05-23 PROCEDURE — 99999 PR PBB SHADOW E&M-EST. PATIENT-LVL III: CPT | Mod: PBBFAC,,, | Performed by: COLON & RECTAL SURGERY

## 2018-05-23 RX ORDER — MORPHINE SULFATE ORAL SOLUTION 10 MG/5ML
2 SOLUTION ORAL EVERY 6 HOURS PRN
Qty: 100 ML | Refills: 0 | Status: ON HOLD | OUTPATIENT
Start: 2018-05-23 | End: 2018-10-18 | Stop reason: HOSPADM

## 2018-05-23 NOTE — LETTER
May 23, 2018      Chandan Vallejo MD  [address] [place] [address]           [facility]  [address]  [facility] [address]  Phone: [phone]          Patient: Murphy Angel   MR Number: [id number]   YOB: 1960   Date of Visit: 5/23/2018       Dear Dr. Chandan Vallejo:    Thank you for referring Murphy Angel to me for evaluation. Attached you will find relevant portions of my assessment and plan of care.    If you have questions, please do not hesitate to call me. I look forward to following Murphy Angel along with you.    Sincerely,    Nacho Dominguez MD    Enclosure  CC:  No Recipients    If you would like to receive this communication electronically, please contact [email or web address] or [phone] to request more information on [employer] access.    For providers and/or their staff who would like to refer a patient to [facility], please contact us through our one-stop-shop provider referral line, [facility] [place], at [phone].    If you feel you have received this communication in error or would no longer like to receive these types of communications, please e-mail [email or web address]

## 2018-05-23 NOTE — PROGRESS NOTES
Patient ID:  Murphy Angel is a 57 y.o. male     Chief Complaint:   Chief Complaint   Patient presents with    Follow-up        HPI: Completed chemo with variable bowels. Small amountg of Fiber and Miralax in AM. Spending most of the day in bathroom small pasty stool takes Ducolax. No results with Imodium or Lomotil. Was started on MS elixir    Doing better with MS. Stools still soft and pasty     6/23/17 ileostomy closure.    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 Neg scans has seen XRT in BR.   CT 2/21/17 No evidence of mets mass not seen. MRI scheduled. Saw Dr Camacho who recommended APR. Came to [facility] for second opinion Feb 15, 2017.     ROS:        Constitutional: No fever, chills, activity or appetite change.      HENT: No hearing loss, facial swelling, neck pain or stiffness.       Eyes: No discharge, itching and visual disturbance.      Respiratory: No apnea, cough, choking or shortness of breath.       Cardiovascular: No leg swelling or chest pain      Gastrointestinal: No abdominal distention or change in bowel habbits     Genitourinary: No dysuria, frequency or flank pain.      Musculoskeletal: No arthralgias or gait problem.      Neurological: No dizziness, seizures or weakness.      Hematological: No adenopathy.      Psychiatric/Behavioral: No hallucinations or behavioral problems.       PE:    APPEARANCE: Well nourished, well developed, in no acute distress.   CHEST: Lungs clear. Normal respiratory effort.  CARDIOVASCULAR: Normal rhythm. No edema.  ABDOMEN: Soft. No tenderness or masses.  Rectum:  Normal skin, NST, mild stenosis dilated to first digit  Anosciopy Grade 2 int hemorrhoids and mild proctitis  Musculoskeletal: Symmetric, normal range of motion and strength.   Neurological: Alert and oriented to person, place, and time.  Normal reflexes.   Skin: Skin is warm and dry.   Psychiatric: Normal mood and affect. Behavior is normal and appropriate.     IMP:  Internal Hemorrhoids, Symptomatic, LAR syndrome    IMP:   Hx rectal cancer,  Low anterior syndroms  PLAN: Continue Bowel regimen.   REfer to Dr Jasso for SNS.

## 2018-06-07 ENCOUNTER — OFFICE VISIT (OUTPATIENT)
Dept: SURGERY | Facility: CLINIC | Age: 58
End: 2018-06-07
Payer: COMMERCIAL

## 2018-06-07 VITALS
HEART RATE: 57 BPM | SYSTOLIC BLOOD PRESSURE: 122 MMHG | WEIGHT: 215.19 LBS | HEIGHT: 70 IN | BODY MASS INDEX: 30.81 KG/M2 | DIASTOLIC BLOOD PRESSURE: 76 MMHG

## 2018-06-07 DIAGNOSIS — R15.9 FULL INCONTINENCE OF FECES: Primary | ICD-10-CM

## 2018-06-07 PROCEDURE — 3008F BODY MASS INDEX DOCD: CPT | Mod: CPTII,S$GLB,, | Performed by: COLON & RECTAL SURGERY

## 2018-06-07 PROCEDURE — 99213 OFFICE O/P EST LOW 20 MIN: CPT | Mod: 24,S$GLB,, | Performed by: COLON & RECTAL SURGERY

## 2018-06-07 PROCEDURE — 99999 PR PBB SHADOW E&M-EST. PATIENT-LVL III: CPT | Mod: PBBFAC,,, | Performed by: COLON & RECTAL SURGERY

## 2018-06-08 NOTE — PROGRESS NOTES
Patient ID:  Murphy Angel is a 57 y.o. male     Chief Complaint:   Fecal incontinence    HPI: Completed chemo with variable bowels. Small amountg of Fiber and Miralax in AM. Spending most of the day in bathroom small pasty stool takes Ducolax. No results with Imodium or Lomotil. Was started on MS elixir  Still having intermittent fecal incontinence     6/23/17 ileostomy closure.    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 Neg scans has seen XRT in BR.   CT 2/21/17 No evidence of mets mass not seen. MRI scheduled. Saw Dr Camacho who recommended APR. Came to [facility] for second opinion Feb 15, 2017.     ROS:        Constitutional: No fever, chills, activity or appetite change.      HENT: No hearing loss, facial swelling, neck pain or stiffness.       Eyes: No discharge, itching and visual disturbance.      Respiratory: No apnea, cough, choking or shortness of breath.       Cardiovascular: No leg swelling or chest pain      Gastrointestinal: No abdominal distention or change in bowel habbits     Genitourinary: No dysuria, frequency or flank pain.      Musculoskeletal: No arthralgias or gait problem.      Neurological: No dizziness, seizures or weakness.      Hematological: No adenopathy.      Psychiatric/Behavioral: No hallucinations or behavioral problems.       PE:    APPEARANCE: Well nourished, well developed, in no acute distress.   CHEST: Lungs clear. Normal respiratory effort.  CARDIOVASCULAR: Normal rhythm. No edema.  ABDOMEN: Soft. No tenderness or masses.  Rectum:  Normal skin, NST, mild stenosis dilated to first digit  Anosciopy Grade 2 int hemorrhoids and mild proctitis  Musculoskeletal: Symmetric, normal range of motion and strength.   Neurological: Alert and oriented to person, place, and time. Normal reflexes.   Skin: Skin is warm and dry.    Psychiatric: Normal mood and affect. Behavior is normal and appropriate.     IMP:  Internal Hemorrhoids, Symptomatic, LAR syndrome    IMP:   Hx rectal cancer,   Intermittent fecal incontinence  Have discussed with him variety of options however based on his past history would recommend that he undergo sacral nerve stimulation he is planning on going on vacation he will return at the end of July when we can schedule a two-stage procedure

## 2018-07-31 ENCOUNTER — OFFICE VISIT (OUTPATIENT)
Dept: SURGERY | Facility: CLINIC | Age: 58
End: 2018-07-31
Payer: COMMERCIAL

## 2018-07-31 VITALS
HEIGHT: 70 IN | WEIGHT: 214.75 LBS | BODY MASS INDEX: 30.74 KG/M2 | HEART RATE: 57 BPM | SYSTOLIC BLOOD PRESSURE: 123 MMHG | DIASTOLIC BLOOD PRESSURE: 77 MMHG

## 2018-07-31 DIAGNOSIS — R15.9 FULL INCONTINENCE OF FECES: Primary | ICD-10-CM

## 2018-07-31 PROCEDURE — 99214 OFFICE O/P EST MOD 30 MIN: CPT | Mod: S$GLB,,, | Performed by: COLON & RECTAL SURGERY

## 2018-07-31 PROCEDURE — 99999 PR PBB SHADOW E&M-EST. PATIENT-LVL V: CPT | Mod: PBBFAC,,, | Performed by: COLON & RECTAL SURGERY

## 2018-07-31 PROCEDURE — 3008F BODY MASS INDEX DOCD: CPT | Mod: CPTII,S$GLB,, | Performed by: COLON & RECTAL SURGERY

## 2018-07-31 RX ORDER — HEPARIN SODIUM 5000 [USP'U]/ML
5000 INJECTION, SOLUTION INTRAVENOUS; SUBCUTANEOUS
Status: CANCELLED | OUTPATIENT
Start: 2018-07-31

## 2018-07-31 RX ORDER — MUPIROCIN 20 MG/G
OINTMENT TOPICAL
Status: CANCELLED | OUTPATIENT
Start: 2018-07-31

## 2018-07-31 RX ORDER — ACETAMINOPHEN 10 MG/ML
1000 INJECTION, SOLUTION INTRAVENOUS
Status: CANCELLED | OUTPATIENT
Start: 2018-07-31 | End: 2018-07-31

## 2018-07-31 RX ORDER — SODIUM CHLORIDE 9 MG/ML
INJECTION, SOLUTION INTRAVENOUS CONTINUOUS
Status: CANCELLED | OUTPATIENT
Start: 2018-07-31

## 2018-07-31 RX ORDER — METRONIDAZOLE 500 MG/100ML
500 INJECTION, SOLUTION INTRAVENOUS
Status: CANCELLED | OUTPATIENT
Start: 2018-07-31

## 2018-08-01 NOTE — PROGRESS NOTES
Patient ID:  Murphy Angel is a 57 y.o. male     Chief Complaint:   Fecal incontinence    HPI: Completed chemo with variable bowels. Small amountg of Fiber and Miralax in AM. Spending most of the day in bathroom small pasty stool takes Ducolax. No results with Imodium or Lomotil. Was started on MS elixir  Still having intermittent fecal incontinence     6/23/17 ileostomy closure.    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 Neg scans has seen XRT in BR.   CT 2/21/17 No evidence of mets mass not seen. MRI scheduled. Saw Dr Camacho who recommended APR. Came to [facility] for second opinion Feb 15, 2017.     ROS:        Constitutional: No fever, chills, activity or appetite change.      HENT: No hearing loss, facial swelling, neck pain or stiffness.       Eyes: No discharge, itching and visual disturbance.      Respiratory: No apnea, cough, choking or shortness of breath.       Cardiovascular: No leg swelling or chest pain      Gastrointestinal: No abdominal distention or change in bowel habbits     Genitourinary: No dysuria, frequency or flank pain.      Musculoskeletal: No arthralgias or gait problem.      Neurological: No dizziness, seizures or weakness.      Hematological: No adenopathy.      Psychiatric/Behavioral: No hallucinations or behavioral problems.       PE:    APPEARANCE: Well nourished, well developed, in no acute distress.   CHEST: Lungs clear. Normal respiratory effort.  CARDIOVASCULAR: Normal rhythm. No edema.  ABDOMEN: Soft. No tenderness or masses.  Rectum:  Normal skin, NST, mild stenosis dilated to first digit  Anosciopy Grade 2 int hemorrhoids and mild proctitis  Musculoskeletal: Symmetric, normal range of motion and strength.   Neurological: Alert and oriented to person, place, and time. Normal reflexes.   Skin: Skin is warm and dry.    Psychiatric: Normal mood and affect. Behavior is normal and appropriate.     IMP:  Internal Hemorrhoids, Symptomatic, LAR syndrome    IMP:   Hx rectal cancer,   Intermittent fecal incontinence  Based on this will schedule him for two-stage sacral nerve stimulation. I have explained the procedure including indications, alternatives, expected outcomes and potential complications. The patient appears to understand and gives informed consent. The patient will be evaluated by the preop center

## 2018-08-02 ENCOUNTER — ANESTHESIA EVENT (OUTPATIENT)
Dept: SURGERY | Facility: HOSPITAL | Age: 58
End: 2018-08-02
Payer: COMMERCIAL

## 2018-08-02 NOTE — PRE ADMISSION SCREENING
"Anesthesia Assessment: Preoperative EQUATION    Planned Procedure: Procedure(s) (LRB):  INSERTION, NEUROSTIMULATOR, PERMANENT, SACRAL STAGE II (N/A)  Requested Anesthesia Type:General  Surgeon: Nacho Cortes MD  Service: Colon and Rectal  Known or anticipated Date of Surgery:8/22/2018    Surgeon notes: reviewed and Full incontinence of feces  Previous anesthesia records:6/23/1785-[facility]-no apparent anesthetic complications    Anesthesia Hx:  No problems with previous Anesthesia      Last PCP note: outside [facility]   Subspecialty notes: Hematology/Oncology, Wound Care-Enterostomal  Tests already available:  No recent tests.      Plan:    Testing:  Hematology Profile, BMP and EKG      Patient  has previously scheduled Medical Appointment:None    Navigation:Phone Completed                      Tests Scheduled.Labs-Hem Prof/BMP/EKG on ?   Consult:OS PCP-Dr. Escamilla to send request for "Clearance"-PENDING                      Results will be tracked by Preop Clinic.              Tabitha Church RN  8/2/18  "

## 2018-08-02 NOTE — ANESTHESIA PREPROCEDURE EVALUATION
" Anesthesia Assessment: Preoperative EQUATION      Surgeon notes: reviewed and Full incontinence of feces  Previous anesthesia records:6/23/1734-[facility]-General-no apparent anesthetic complications    Anesthesia Hx:  No problems with previous Anesthesia      Last PCP note: outside [facility]   Subspecialty notes: Hematology/Oncology, Wound Care-Enterostomal  Tests already available:  No recent tests.      Plan:    Testing:  Hematology Profile, BMP and EKG      Patient  has previously scheduled Medical Appointment:None    Navigation:Phone Completed                        Tests Scheduled.Labs-Hem Prof/BMP/EKG      Consult:[facility] PCP-Dr. Munson-Aye to send request for "Clearance" & request PCP to do Labs-Hem Profile /BMP/EKG-PENDING.    Plan reviewed with anesthesia-.                        Results will be tracked by Preop Clinic.              Tabitha Church RN  8/9/18   Addendum: Received [facility] PCP  "Clearance" note & recent labs-CBC/BMP/EKG results from 8/10/18 visit.  Tabitha Church RN  8/13/18 08/02/2018  Murphy Angel is a 57 y.o., male here for removal of sacral neurostimulator.    Past Medical History:   Diagnosis Date    Cancer     Thyroid disease        Past Surgical History:   Procedure Laterality Date    COLON SURGERY  06/23/2017    Ileostomy Closure    COLON SURGERY  05/01/2017    Resection-Colon & Rectal Low Anterior    EYE SURGERY      Prosetheic eye placed-right    illeostomy           Anesthesia Evaluation    I have reviewed the Patient Summary Reports.     I have reviewed the Medications.     Review of Systems  Anesthesia Hx:  No problems with previous Anesthesia  History of prior surgery of interest to airway management or planning: Denies Family Hx of Anesthesia complications.   Denies Personal Hx of Anesthesia complications.   Social:  No Alcohol Use, Former Smoker "    Hematology/Oncology:         -- Cancer in past history: Other (see Oncology comments) chemotherapy, radiation and surgery  Oncology Comments: Hx of Colon Cancer     EENT/Dental:   Wears glasses   Cardiovascular:   Exercise tolerance: good Denies MI.    Denies Angina. hyperlipidemia Walking daily   Pulmonary:   Sleep Apnea, CPAP    Neurological:   resolved  Peripheral Neuropathy    Endocrine:   Hypothyroidism        Physical Exam  General:  Well nourished    Airway/Jaw/Neck:  Airway Findings: Mouth Opening: Normal Tongue: Normal  General Airway Assessment: Adult  Mallampati: III  TM Distance: Normal, at least 6 cm      Dental:  Dental Findings: In tact   Chest/Lungs:  Chest/Lungs Findings: Normal Respiratory Rate     Heart/Vascular:  Heart Findings: Rate: Normal        Mental Status:  Mental Status Findings:  Alert and Oriented     Tabitha Church RN  8/9/18    Anesthesia Plan  Type of Anesthesia, risks & benefits discussed:  Anesthesia Type:  general  Patient's Preference:   Intra-op Monitoring Plan: standard ASA monitors  Intra-op Monitoring Plan Comments:   Post Op Pain Control Plan: multimodal analgesia, IV/PO Opioids PRN and per primary service following discharge from PACU  Post Op Pain Control Plan Comments:   Induction:   IV  Beta Blocker:  Patient is not currently on a Beta-Blocker (No further documentation required).       Informed Consent: Patient understands risks and agrees with Anesthesia plan.  Questions answered. Anesthesia consent signed with patient.  ASA Score: 2     Day of Surgery Review of History & Physical:    H&P update referred to the surgeon.         Ready For Surgery From Anesthesia Perspective.

## 2018-08-03 NOTE — PRE-PROCEDURE INSTRUCTIONS
PreOp Instructions given:     - Verbal medication information (what to hold and what to take)   - NPO guidelines   - Arrival place directions given;  - Bathing with antibacterial soap   - Don't wear any jewelry or bring any valuables AM of surgery   - No makeup or moisturizer to face   - No perfume/cologne, powder, lotions or aftershave   Pt. verbalized understanding.   Denies any family history of side effects or issues with anesthesia or sedation.     PT HAS PROSTHETIC RT EYE

## 2018-08-06 ENCOUNTER — ANESTHESIA (OUTPATIENT)
Dept: SURGERY | Facility: HOSPITAL | Age: 58
End: 2018-08-06
Payer: COMMERCIAL

## 2018-08-06 ENCOUNTER — HOSPITAL ENCOUNTER (OUTPATIENT)
Facility: HOSPITAL | Age: 58
Discharge: HOME | End: 2018-08-06
Attending: COLON & RECTAL SURGERY | Admitting: COLON & RECTAL SURGERY
Payer: COMMERCIAL

## 2018-08-06 ENCOUNTER — ANESTHESIA EVENT (OUTPATIENT)
Dept: SURGERY | Facility: HOSPITAL | Age: 58
End: 2018-08-06
Payer: COMMERCIAL

## 2018-08-06 VITALS
RESPIRATION RATE: 18 BRPM | WEIGHT: 214 LBS | TEMPERATURE: 98 F | SYSTOLIC BLOOD PRESSURE: 132 MMHG | HEIGHT: 70 IN | HEART RATE: 51 BPM | DIASTOLIC BLOOD PRESSURE: 89 MMHG | BODY MASS INDEX: 30.64 KG/M2 | OXYGEN SATURATION: 99 %

## 2018-08-06 DIAGNOSIS — R15.9 FULL INCONTINENCE OF FECES: ICD-10-CM

## 2018-08-06 PROCEDURE — 64581 OPN IMPLTJ NEA SACRAL NERVE: CPT | Mod: ,,, | Performed by: COLON & RECTAL SURGERY

## 2018-08-06 PROCEDURE — C1778 LEAD, NEUROSTIMULATOR: HCPCS | Performed by: COLON & RECTAL SURGERY

## 2018-08-06 PROCEDURE — 63600175 PHARM REV CODE 636 W HCPCS: Performed by: COLON & RECTAL SURGERY

## 2018-08-06 PROCEDURE — C1894 INTRO/SHEATH, NON-LASER: HCPCS | Performed by: COLON & RECTAL SURGERY

## 2018-08-06 PROCEDURE — 63600175 PHARM REV CODE 636 W HCPCS: Performed by: NURSE PRACTITIONER

## 2018-08-06 PROCEDURE — 25000003 PHARM REV CODE 250: Performed by: STUDENT IN AN ORGANIZED HEALTH CARE EDUCATION/TRAINING PROGRAM

## 2018-08-06 PROCEDURE — 71000015 HC POSTOP RECOV 1ST HR: Performed by: COLON & RECTAL SURGERY

## 2018-08-06 PROCEDURE — 27201423 OPTIME MED/SURG SUP & DEVICES STERILE SUPPLY: Performed by: COLON & RECTAL SURGERY

## 2018-08-06 PROCEDURE — 71000033 HC RECOVERY, INTIAL HOUR: Performed by: COLON & RECTAL SURGERY

## 2018-08-06 PROCEDURE — C1767 GENERATOR, NEURO NON-RECHARG: HCPCS | Performed by: COLON & RECTAL SURGERY

## 2018-08-06 PROCEDURE — 36000708 HC OR TIME LEV III 1ST 15 MIN: Performed by: COLON & RECTAL SURGERY

## 2018-08-06 PROCEDURE — S0030 INJECTION, METRONIDAZOLE: HCPCS | Performed by: NURSE PRACTITIONER

## 2018-08-06 PROCEDURE — 25000003 PHARM REV CODE 250: Performed by: NURSE PRACTITIONER

## 2018-08-06 PROCEDURE — 36000709 HC OR TIME LEV III EA ADD 15 MIN: Performed by: COLON & RECTAL SURGERY

## 2018-08-06 PROCEDURE — 37000009 HC ANESTHESIA EA ADD 15 MINS: Performed by: COLON & RECTAL SURGERY

## 2018-08-06 PROCEDURE — 63600175 PHARM REV CODE 636 W HCPCS: Performed by: STUDENT IN AN ORGANIZED HEALTH CARE EDUCATION/TRAINING PROGRAM

## 2018-08-06 PROCEDURE — 25000003 PHARM REV CODE 250: Performed by: NURSE ANESTHETIST, CERTIFIED REGISTERED

## 2018-08-06 PROCEDURE — 63600175 PHARM REV CODE 636 W HCPCS: Performed by: NURSE ANESTHETIST, CERTIFIED REGISTERED

## 2018-08-06 PROCEDURE — 37000008 HC ANESTHESIA 1ST 15 MINUTES: Performed by: COLON & RECTAL SURGERY

## 2018-08-06 PROCEDURE — C9290 INJ, BUPIVACAINE LIPOSOME: HCPCS | Performed by: COLON & RECTAL SURGERY

## 2018-08-06 PROCEDURE — D9220A PRA ANESTHESIA: Mod: CRNA,,, | Performed by: NURSE ANESTHETIST, CERTIFIED REGISTERED

## 2018-08-06 PROCEDURE — D9220A PRA ANESTHESIA: Mod: ANES,,, | Performed by: ANESTHESIOLOGY

## 2018-08-06 DEVICE — LEAD KIT TINED: Type: IMPLANTABLE DEVICE | Site: BACK | Status: FUNCTIONAL

## 2018-08-06 RX ORDER — OXYCODONE AND ACETAMINOPHEN 5; 325 MG/1; MG/1
1 TABLET ORAL EVERY 6 HOURS PRN
Qty: 5 TABLET | Refills: 0 | Status: ON HOLD | OUTPATIENT
Start: 2018-08-06 | End: 2018-08-22 | Stop reason: HOSPADM

## 2018-08-06 RX ORDER — LIDOCAINE HCL/PF 100 MG/5ML
SYRINGE (ML) INTRAVENOUS
Status: DISCONTINUED | OUTPATIENT
Start: 2018-08-06 | End: 2018-08-06

## 2018-08-06 RX ORDER — ACETAMINOPHEN 10 MG/ML
1000 INJECTION, SOLUTION INTRAVENOUS
Status: COMPLETED | OUTPATIENT
Start: 2018-08-06 | End: 2018-08-06

## 2018-08-06 RX ORDER — FENTANYL CITRATE 50 UG/ML
INJECTION, SOLUTION INTRAMUSCULAR; INTRAVENOUS
Status: DISCONTINUED | OUTPATIENT
Start: 2018-08-06 | End: 2018-08-06

## 2018-08-06 RX ORDER — HEPARIN SODIUM 5000 [USP'U]/ML
5000 INJECTION, SOLUTION INTRAVENOUS; SUBCUTANEOUS
Status: COMPLETED | OUTPATIENT
Start: 2018-08-06 | End: 2018-08-06

## 2018-08-06 RX ORDER — OXYCODONE AND ACETAMINOPHEN 5; 325 MG/1; MG/1
1 TABLET ORAL EVERY 4 HOURS PRN
Status: DISCONTINUED | OUTPATIENT
Start: 2018-08-06 | End: 2018-08-06 | Stop reason: HOSPADM

## 2018-08-06 RX ORDER — METRONIDAZOLE 500 MG/100ML
500 INJECTION, SOLUTION INTRAVENOUS
Status: COMPLETED | OUTPATIENT
Start: 2018-08-06 | End: 2018-08-06

## 2018-08-06 RX ORDER — MUPIROCIN 20 MG/G
OINTMENT TOPICAL
Status: DISCONTINUED | OUTPATIENT
Start: 2018-08-06 | End: 2018-08-06 | Stop reason: HOSPADM

## 2018-08-06 RX ORDER — OXYCODONE AND ACETAMINOPHEN 5; 325 MG/1; MG/1
TABLET ORAL
Status: DISCONTINUED
Start: 2018-08-06 | End: 2018-08-06 | Stop reason: HOSPADM

## 2018-08-06 RX ORDER — SODIUM CHLORIDE 9 MG/ML
INJECTION, SOLUTION INTRAVENOUS CONTINUOUS
Status: DISCONTINUED | OUTPATIENT
Start: 2018-08-06 | End: 2018-08-06 | Stop reason: HOSPADM

## 2018-08-06 RX ORDER — SUCCINYLCHOLINE CHLORIDE 20 MG/ML
INJECTION INTRAMUSCULAR; INTRAVENOUS
Status: DISCONTINUED | OUTPATIENT
Start: 2018-08-06 | End: 2018-08-06

## 2018-08-06 RX ORDER — FENTANYL CITRATE 50 UG/ML
25 INJECTION, SOLUTION INTRAMUSCULAR; INTRAVENOUS EVERY 5 MIN PRN
Status: DISCONTINUED | OUTPATIENT
Start: 2018-08-06 | End: 2018-08-06 | Stop reason: HOSPADM

## 2018-08-06 RX ORDER — SODIUM CHLORIDE 0.9 % (FLUSH) 0.9 %
3 SYRINGE (ML) INJECTION
Status: DISCONTINUED | OUTPATIENT
Start: 2018-08-06 | End: 2018-08-06 | Stop reason: HOSPADM

## 2018-08-06 RX ORDER — ONDANSETRON 2 MG/ML
INJECTION INTRAMUSCULAR; INTRAVENOUS
Status: DISCONTINUED | OUTPATIENT
Start: 2018-08-06 | End: 2018-08-06

## 2018-08-06 RX ORDER — PROPOFOL 10 MG/ML
VIAL (ML) INTRAVENOUS
Status: DISCONTINUED | OUTPATIENT
Start: 2018-08-06 | End: 2018-08-06

## 2018-08-06 RX ORDER — MIDAZOLAM HYDROCHLORIDE 1 MG/ML
INJECTION, SOLUTION INTRAMUSCULAR; INTRAVENOUS
Status: DISCONTINUED | OUTPATIENT
Start: 2018-08-06 | End: 2018-08-06

## 2018-08-06 RX ORDER — GLYCOPYRROLATE 0.2 MG/ML
INJECTION INTRAMUSCULAR; INTRAVENOUS
Status: DISCONTINUED | OUTPATIENT
Start: 2018-08-06 | End: 2018-08-06

## 2018-08-06 RX ADMIN — SODIUM CHLORIDE: 0.9 INJECTION, SOLUTION INTRAVENOUS at 10:08

## 2018-08-06 RX ADMIN — MUPIROCIN: 20 OINTMENT TOPICAL at 10:08

## 2018-08-06 RX ADMIN — FENTANYL CITRATE 100 MCG: 50 INJECTION, SOLUTION INTRAMUSCULAR; INTRAVENOUS at 12:08

## 2018-08-06 RX ADMIN — OXYCODONE HYDROCHLORIDE AND ACETAMINOPHEN 1 TABLET: 5; 325 TABLET ORAL at 02:08

## 2018-08-06 RX ADMIN — HEPARIN SODIUM 5000 UNITS: 5000 INJECTION, SOLUTION INTRAVENOUS; SUBCUTANEOUS at 10:08

## 2018-08-06 RX ADMIN — GLYCOPYRROLATE 0.2 MG: 0.2 INJECTION, SOLUTION INTRAMUSCULAR; INTRAVENOUS at 01:08

## 2018-08-06 RX ADMIN — SODIUM CHLORIDE, SODIUM GLUCONATE, SODIUM ACETATE, POTASSIUM CHLORIDE, MAGNESIUM CHLORIDE, SODIUM PHOSPHATE, DIBASIC, AND POTASSIUM PHOSPHATE: .53; .5; .37; .037; .03; .012; .00082 INJECTION, SOLUTION INTRAVENOUS at 01:08

## 2018-08-06 RX ADMIN — PROPOFOL 100 MG: 10 INJECTION, EMULSION INTRAVENOUS at 12:08

## 2018-08-06 RX ADMIN — SUCCINYLCHOLINE CHLORIDE 140 MG: 20 INJECTION, SOLUTION INTRAMUSCULAR; INTRAVENOUS at 12:08

## 2018-08-06 RX ADMIN — ONDANSETRON 4 MG: 2 INJECTION INTRAMUSCULAR; INTRAVENOUS at 01:08

## 2018-08-06 RX ADMIN — PROPOFOL 50 MG: 10 INJECTION, EMULSION INTRAVENOUS at 12:08

## 2018-08-06 RX ADMIN — CEFTRIAXONE 2 G: 2 INJECTION, SOLUTION INTRAVENOUS at 12:08

## 2018-08-06 RX ADMIN — METRONIDAZOLE 500 MG: 500 SOLUTION INTRAVENOUS at 12:08

## 2018-08-06 RX ADMIN — MIDAZOLAM HYDROCHLORIDE 2 MG: 1 INJECTION, SOLUTION INTRAMUSCULAR; INTRAVENOUS at 12:08

## 2018-08-06 RX ADMIN — ACETAMINOPHEN 1000 MG: 10 INJECTION, SOLUTION INTRAVENOUS at 10:08

## 2018-08-06 RX ADMIN — VANCOMYCIN HYDROCHLORIDE 750 MG: 750 INJECTION, POWDER, LYOPHILIZED, FOR SOLUTION INTRAVENOUS at 12:08

## 2018-08-06 RX ADMIN — LIDOCAINE HYDROCHLORIDE 100 MG: 20 INJECTION, SOLUTION INTRAVENOUS at 12:08

## 2018-08-06 NOTE — H&P (VIEW-ONLY)
Patient ID:  Murphy Angle is a 57 y.o. male     Chief Complaint:   Fecal incontinence    HPI: Completed chemo with variable bowels. Small amountg of Fiber and Miralax in AM. Spending most of the day in bathroom small pasty stool takes Ducolax. No results with Imodium or Lomotil. Was started on MS elixir  Still having intermittent fecal incontinence     6/23/17 ileostomy closure.    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 Neg scans has seen XRT in [facility].   CT 2/21/17 No evidence of mets mass not seen. MRI scheduled. Saw Dr Camacho who recommended APR. Came to [facility] for second opinion Feb 15, 2017.     ROS:        Constitutional: No fever, chills, activity or appetite change.      HENT: No hearing loss, facial swelling, neck pain or stiffness.       Eyes: No discharge, itching and visual disturbance.      Respiratory: No apnea, cough, choking or shortness of breath.       Cardiovascular: No leg swelling or chest pain      Gastrointestinal: No abdominal distention or change in bowel habbits     Genitourinary: No dysuria, frequency or flank pain.      Musculoskeletal: No arthralgias or gait problem.      Neurological: No dizziness, seizures or weakness.      Hematological: No adenopathy.      Psychiatric/Behavioral: No hallucinations or behavioral problems.       PE:    APPEARANCE: Well nourished, well developed, in no acute distress.   CHEST: Lungs clear. Normal respiratory effort.  CARDIOVASCULAR: Normal rhythm. No edema.  ABDOMEN: Soft. No tenderness or masses.  Rectum:  Normal skin, NST, mild stenosis dilated to first digit  Anosciopy Grade 2 int hemorrhoids and mild proctitis  Musculoskeletal: Symmetric, normal range of motion and strength.   Neurological: Alert and oriented to person, place, and time. Normal reflexes.   Skin: Skin is warm and dry.    Psychiatric: Normal mood and affect. Behavior is normal and appropriate.     IMP:  Internal Hemorrhoids, Symptomatic, LAR syndrome    IMP:   Hx rectal cancer,   Intermittent fecal incontinence  Based on this will schedule him for two-stage sacral nerve stimulation. I have explained the procedure including indications, alternatives, expected outcomes and potential complications. The patient appears to understand and gives informed consent. The patient will be evaluated by the preop center

## 2018-08-06 NOTE — PROGRESS NOTES
Plan of care reviewed with pt & spouse, both verbalized understanding, pt progressing with plan of care, denies nausea, pain tolerable, tolerating PO, reviewed all DC instructions, home meds, scripts, when to call MD, when to follow-up, answered questions.

## 2018-08-06 NOTE — TRANSFER OF CARE
"Anesthesia Transfer of Care Note    Patient: Murphy Angel    Procedure(s) Performed: Procedure(s) (LRB):  INSERTION, NEUROSTIMULATOR, PERMANENT, SACRAL STAGE 1 (N/A)    Patient location: PACU    Anesthesia Type: general    Transport from OR: Transported from OR on room air with adequate spontaneous ventilation    Post pain: adequate analgesia    Post assessment: no apparent anesthetic complications and tolerated procedure well    Post vital signs: stable    Level of consciousness: awake, alert and oriented    Nausea/Vomiting: no nausea/vomiting    Complications: none    Transfer of care protocol was followed      Last vitals:   Visit Vitals  /82 (BP Location: Left arm, Patient Position: Lying)   Pulse 69   Temp 36.2 °C (97.2 °F) (Temporal)   Resp 14   Ht 5' 10" (1.778 m)   Wt 97.1 kg (214 lb)   SpO2 100%   BMI 30.71 kg/m²     "

## 2018-08-06 NOTE — ANESTHESIA PREPROCEDURE EVALUATION
08/06/2018  Murphy Angel is a 57 y.o., male.    Pre-operative evaluation for Procedure(s) (LRB):  INSERTION, NEUROSTIMULATOR, PERMANENT, SACRAL STAGE 1 (N/A)    Prev airway:  Present Prior to Hospital Arrival?: No; Placement Date: 05/15/17; Placement Time: 0726; Method of Intubation: Tomas; Inserted by: Anesthesia Resident; Airway Device: Endotracheal Tube; Mask Ventilation: Easy; Intubated: Postinduction; Blade: Petey #4; Airway Device Size: 8.0; Style: Cuffed; Cuff Inflation: Minimal occlusive pressure; Placement Verified By: Auscultation, Capnometry, ETT Condensation; Grade: Grade III; Complicating Factors: Anterior larynx, Large/Floppy epiglottis; Intubation Findings: Positive EtCO2, Bilateral breath sounds, Atraumatic/Condition of teeth unchanged;  Depth of Insertion: 24; Securment: Lips; Complications: None; Breath Sounds: Equal Bilateral; Insertion Attempts: 1; Removal Date: 05/15/17;  Removal Time: 1131      Murphy Angel is a 57 y.o. male     Patient Active Problem List   Diagnosis    History of rectal cancer    Hemorrhoids, internal, with bleeding    Bowel dysfunction    Full incontinence of feces       Review of patient's allergies indicates:  No Known Allergies    No current facility-administered medications on file prior to encounter.      Current Outpatient Prescriptions on File Prior to Encounter   Medication Sig Dispense Refill    diphenoxylate-atropine 2.5-0.025 mg (LOMOTIL) 2.5-0.025 mg per tablet TAKE ONE TO TWO TABLETS BY MOUTH 4 TIMES DAILY AS NEEDED FOR DIARRHEA 120 tablet 3    levothyroxine (SYNTHROID) 50 MCG tablet Take 1 tablet by mouth once daily.      morphine 10 mg/5 mL solution Take 1 mL (2 mg total) by mouth every 6 (six) hours as needed for Pain. 100 mL 0    polyethylene glycol (GLYCOLAX) 17 gram/dose powder       pravastatin (PRAVACHOL) 80 MG tablet Take 1  tablet by mouth once daily.         Past Surgical History:   Procedure Laterality Date    COLON SURGERY  06/23/2017    Ileostomy Closure    COLON SURGERY  05/01/2017    Resection-Colon & Rectal Low Anterior    EYE SURGERY      Prosetheic eye placed-right    illeostomy         Social History     Social History    Marital status:      Spouse name: N/A    Number of children: N/A    Years of education: N/A     Occupational History    Not on file.     Social History Main Topics    Smoking status: Former Smoker     Packs/day: 1.00     Years: 20.00     Types: Cigarettes     Quit date: 5/13/2017    Smokeless tobacco: Never Used    Alcohol use Yes      Comment: occasionally    Drug use: No    Sexual activity: Not on file     Other Topics Concern    Not on file     Social History Narrative    No narrative on file         CBC: No results for input(s): WBC, RBC, HGB, HCT, PLT, MCV, MCH, MCHC in the last 72 hours.    CMP: No results for input(s): NA, K, CL, CO2, BUN, CREATININE, GLU, MG, PHOS, CALCIUM, ALBUMIN, PROT, ALKPHOS, ALT, AST, BILITOT in the last 72 hours.    INR  No results for input(s): PT, INR, PROTIME, APTT in the last 72 hours.          Pre-op Assessment    I have reviewed the Patient Summary Reports.     I have reviewed the Nursing Notes.   I have reviewed the Medications.     Review of Systems  Anesthesia Hx:  No problems with previous Anesthesia   Denies Personal Hx of Anesthesia complications.   Social:  Smoker    Hematology/Oncology:  Hematology Normal      Current/Recent Cancer. surgery Oncology Comments: Rectal CA     EENT/Dental:EENT/Dental Normal   Pulmonary:   Sleep Apnea, CPAP    Renal/:  Renal/ Normal     Hepatic/GI:  Hepatic/GI Normal    Neurological:  Neurology Normal    Endocrine:   Hypothyroidism        Physical Exam  General:  Well nourished    Airway/Jaw/Neck:  Airway Findings: Mouth Opening: Normal Tongue: Normal  Mallampati: III      Dental:  Dental Findings: In tact,  upper front caps   Chest/Lungs:  Chest/Lungs Findings: Clear to auscultation, Normal Respiratory Rate     Heart/Vascular:  Heart Findings: Rate: Normal  Rhythm: Regular Rhythm        Mental Status:  Mental Status Findings:  Cooperative, Alert and Oriented         Anesthesia Plan  Type of Anesthesia, risks & benefits discussed:  Anesthesia Type:  general  Patient's Preference:   Intra-op Monitoring Plan: standard ASA monitors  Intra-op Monitoring Plan Comments:   Post Op Pain Control Plan: per primary service following discharge from PACU  Post Op Pain Control Plan Comments:   Induction:   IV  Beta Blocker:  Patient is not currently on a Beta-Blocker (No further documentation required).       Informed Consent: Patient understands risks and agrees with Anesthesia plan.  Questions answered. Anesthesia consent signed with patient.  ASA Score: 2     Day of Surgery Review of History & Physical:    H&P update referred to the surgeon.         Ready For Surgery From Anesthesia Perspective.

## 2018-08-06 NOTE — INTERVAL H&P NOTE
The patient has been examined and the H&P has been reviewed:    I concur with the findings and no changes have occurred since H&P was written. Plan for first stage of SNS today.     Anesthesia/Surgery risks, benefits and alternative options discussed and understood by patient/family.          Active Hospital Problems    Diagnosis  POA    Full incontinence of feces [R15.9]  Yes      Resolved Hospital Problems    Diagnosis Date Resolved POA   No resolved problems to display.     Have seen and examined the patient with the fellow and agree with their plan.  GREGG RANDALL

## 2018-08-06 NOTE — BRIEF OP NOTE
[facility]  Brief Operative Note     SUMMARY     Surgery Date: 8/6/2018     Surgeon(s) and Role:     * Derek Benavidez MD - Fellow     * Rand Meredith MD - Resident - Assisting     * Nacho Cortes MD - Primary        Pre-op Diagnosis:  Full incontinence of feces [R15.9]    Post-op Diagnosis:  Post-Op Diagnosis Codes:     * Full incontinence of feces [R15.9]    Procedure(s) (LRB):  INSERTION, NEUROSTIMULATOR, PERMANENT, SACRAL STAGE 1 (N/A)    Anesthesia: General    Description of the findings of the procedure:   1. Needle placement in S3 foramen confirmed on fluoroscopic imaging  2. positive bellow and toe reflex on stimulation at lead sites 0-3    Findings/Key Components: Neuro stimulator placement, stage 1    Estimated Blood Loss: minimal         Specimens:   Specimen (12h ago through future)    None          Discharge Note    SUMMARY     Admit Date: 8/6/2018    Discharge Date and Time:  08/06/2018 2:12 PM    Hospital Course (synopsis of major diagnoses, care, treatment, and services provided during the course of the hospital stay): Murphy Angel is a 57 y.o. male with fecal incontinence following LAR who presented for interstim placement. The procedure was performed successfully and the patient recovered in PACU. He was discharged home in stable condition     Final Diagnosis: Post-Op Diagnosis Codes:     * Full incontinence of feces [R15.9]    Disposition: Home or Self Care    Follow Up/Patient Instructions: Follow up with Dr. Cortes next week as scheduled    Medications:  Reconciled Home Medications:      Medication List      ASK your doctor about these medications    diphenoxylate-atropine 2.5-0.025 mg 2.5-0.025 mg per tablet  Commonly known as:  LOMOTIL  TAKE ONE TO TWO TABLETS BY MOUTH 4 TIMES DAILY AS NEEDED FOR DIARRHEA     levothyroxine 50 MCG tablet  Commonly known as:  SYNTHROID  Take 1 tablet by mouth once daily.     morphine 10 mg/5 mL solution  Take 1 mL (2  mg total) by mouth every 6 (six) hours as needed for Pain.     polyethylene glycol 17 gram/dose powder  Commonly known as:  GLYCOLAX     pravastatin 80 MG tablet  Commonly known as:  PRAVACHOL  Take 1 tablet by mouth once daily.          Activity ad rose  Have seen and examined the patient with the fellow and agree with their plan.  GREGG RANDALL    .alli

## 2018-08-06 NOTE — DISCHARGE INSTRUCTIONS

## 2018-08-07 NOTE — ANESTHESIA POSTPROCEDURE EVALUATION
"Anesthesia Post Evaluation    Patient: Murphy Angel    Procedure(s) Performed: Procedure(s) (LRB):  INSERTION, NEUROSTIMULATOR, PERMANENT, SACRAL STAGE 1 (N/A)    Final Anesthesia Type: general  Patient location during evaluation: [facility]  Patient participation: Yes- Able to Participate  Level of consciousness: awake and alert and oriented  Post-procedure vital signs: reviewed and stable  Pain management: adequate  Airway patency: patent  PONV status at discharge: No PONV  Anesthetic complications: no      Cardiovascular status: blood pressure returned to baseline  Respiratory status: unassisted and spontaneous ventilation  Hydration status: euvolemic  Follow-up not needed.        Visit Vitals  /89   Pulse (!) 51   Temp 36.4 °C (97.5 °F) (Temporal)   Resp 18   Ht 5' 10" (1.778 m)   Wt 97.1 kg (214 lb)   SpO2 99%   BMI 30.71 kg/m²       Pain/Conrad Score: Pain Assessment Performed: Yes (8/6/2018  3:13 PM)  Presence of Pain: complains of pain/discomfort (8/6/2018  3:13 PM)  Pain Rating Prior to Med Admin: 4 (8/6/2018  2:17 PM)  Pain Rating Post Med Admin: 4 (8/6/2018  3:13 PM)  Conrad Score: 10 (8/6/2018  3:13 PM)      "

## 2018-08-07 NOTE — OP NOTE
DATE OF PROCEDURE:  08/06/2018    PREOPERATIVE DIAGNOSIS:  Fecal incontinence.    POSTOPERATIVE DIAGNOSIS:  Fecal incontinence.    INDICATION:  Mr. Angel is a 57-year-old male, status post low anterior   resection, no sign of recurrence, who has fecal incontinence, here for   definitive therapy.    PROCEDURE:  Here for stage I therapy.    DESCRIPTION OF PROCEDURE:  The patient was brought to the Operating Room and   placed in the prone position under general anesthesia.  The back was prepped and   draped in a sterile manner and Ioban was placed.  Under fluoroscopic guidance,   the S3 foramina were cannulated in both the left and right side.  There were   better nathen on the left side than the right side with more S2 entrapment on   the right side.  Left side was chosen.  Then, after the nerve was identified, a   wire was placed through the needle and then a dilator was placed over the wire.    Once this was done, under fluoroscopic guidance, the lead was placed.  Testing   was then performed again showing good nathen and toe movement and no calf and   no heel rotation.  Once the dilator was removed, the lead was tunneled to a   premade pocket.  On the right side, the pocket was made by making a 3 cm   incision, dissecting down to the gluteal fascia, and inferiorly for   approximately 2 cm.  Once the lead was tunneled to the patient's right side, the   boot and lead were secured and tunneled to the contralateral side.  Hemostasis   was assured.  Exparel was placed in the cavity.  It was closed in two layers.    The midline wound was closed with one layer.  Dermabond was applied.  The   extraction site was covered with Dermabond and Biopatch and Steri-Strips.    Hemostasis was assured.  Sponge and needle count were correct.  The patient   tolerated the procedure and was transferred to Recovery Room in stable   condition.      DAM/HN  dd: 08/06/2018 17:04:40 (CDT)  td: 08/06/2018 19:25:42 (CDT)  Doc ID   #[id number]   Job ID #[id number]    CC:

## 2018-08-16 ENCOUNTER — TELEPHONE (OUTPATIENT)
Dept: SURGERY | Facility: CLINIC | Age: 58
End: 2018-08-16

## 2018-08-16 NOTE — TELEPHONE ENCOUNTER
----- Message from Lynn Cannon MA sent at 8/16/2018 11:47 AM CDT -----  Contact: [phone]  Needs Advice    Reason for call: pt is having surgery 8/23, wife states that she thought Dr Cortes wanted to see him in the office before then but no appt  has been made.  Can you confirm if that's is needed or not?         Communication Preference: [phone]

## 2018-08-21 ENCOUNTER — OFFICE VISIT (OUTPATIENT)
Dept: SURGERY | Facility: CLINIC | Age: 58
End: 2018-08-21
Payer: COMMERCIAL

## 2018-08-21 VITALS — DIASTOLIC BLOOD PRESSURE: 76 MMHG | HEART RATE: 74 BPM | SYSTOLIC BLOOD PRESSURE: 131 MMHG

## 2018-08-21 DIAGNOSIS — R15.9 FECAL INCONTINENCE: ICD-10-CM

## 2018-08-21 DIAGNOSIS — R15.9 FULL INCONTINENCE OF FECES: Primary | ICD-10-CM

## 2018-08-21 PROCEDURE — 99999 PR PBB SHADOW E&M-EST. PATIENT-LVL II: CPT | Mod: PBBFAC,,, | Performed by: NURSE PRACTITIONER

## 2018-08-21 PROCEDURE — 99212 OFFICE O/P EST SF 10 MIN: CPT | Mod: S$GLB,,, | Performed by: NURSE PRACTITIONER

## 2018-08-21 RX ORDER — SODIUM CHLORIDE 9 MG/ML
INJECTION, SOLUTION INTRAVENOUS CONTINUOUS
Status: CANCELLED | OUTPATIENT
Start: 2018-08-21

## 2018-08-21 RX ORDER — MUPIROCIN 20 MG/G
OINTMENT TOPICAL
Status: CANCELLED | OUTPATIENT
Start: 2018-08-21

## 2018-08-21 NOTE — H&P (VIEW-ONLY)
History & Physical    SUBJECTIVE:     History of Present Illness:  Patient is a 57 y.o. male    LAR with DLI for colon cancer by Dr Dominguez. 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 He underwent stage 1 interstim on .   He has had no improvement in his symptoms. He desires removal of the interstim.      No chief complaint on file.      Review of patient's allergies indicates:  No Known Allergies    Current Outpatient Medications   Medication Sig Dispense Refill    diphenoxylate-atropine 2.5-0.025 mg (LOMOTIL) 2.5-0.025 mg per tablet TAKE ONE TO TWO TABLETS BY MOUTH 4 TIMES DAILY AS NEEDED FOR DIARRHEA 120 tablet 3    DULCOLAX, BISACODYL, ORAL Take by mouth.      levothyroxine (SYNTHROID) 50 MCG tablet Take 1 tablet by mouth once daily.      polyethylene glycol (GLYCOLAX) 17 gram/dose powder       pravastatin (PRAVACHOL) 80 MG tablet Take 1 tablet by mouth once daily.      morphine 10 mg/5 mL solution Take 1 mL (2 mg total) by mouth every 6 (six) hours as needed for Pain. 100 mL 0    oxyCODONE-acetaminophen (PERCOCET) 5-325 mg per tablet Take 1 tablet by mouth every 6 (six) hours as needed for Pain. 5 tablet 0     No current facility-administered medications for this visit.        Past Medical History:   Diagnosis Date    Cancer     Thyroid disease      Past Surgical History:   Procedure Laterality Date    COLON SURGERY  2017    Ileostomy Closure    COLON SURGERY  2017    Resection-Colon & Rectal Low Anterior    EYE SURGERY      Prosetheic eye placed-right    illeostomy       Family History   Problem Relation Age of Onset    Anesthesia problems Neg Hx      Social History     Tobacco Use    Smoking status: Former Smoker     Packs/day: 1.00     Years: 20.00     Pack years: 20.00     Types: Cigarettes     Last attempt to quit: 2017     Years since quittin.2    Smokeless tobacco: Never Used   Substance Use Topics    Alcohol use: Yes     Comment: occasionally     Drug use: No        Review of Systems:           Review of Systems  Anesthesia Hx:  No problems with previous Anesthesia   Hematology/Oncology:  Hematology Normal        Cardiovascular:  Cardiovascular Normal     Renal/:  Renal/ Normal     Musculoskeletal:  Musculoskeletal Normal    Neurological:  Neurology Normal    Dermatological:  Skin Normal    Psych:  Psychiatric Normal           OBJECTIVE:     Vital Signs (Most Recent)  Pulse: 74 (08/21/18 0954)  BP: 131/76 (08/21/18 0954)           Physical Exam:    Physical Exam  General:  Well nourished, Obesity    Airway/Jaw/Neck:  AIRWAY FINDINGS: Normal      Eyes/Ears/Nose:  EYES/EARS/NOSE FINDINGS: Normal   Dental:  DENTAL FINDINGS: Normal   Chest/Lungs:  Chest/Lungs Clear    Heart/Vascular:  Heart Findings: Normal Heart murmur: negative Vascular Findings: Normal    Abdomen:  Abdomen Findings: Normal    Musculoskeletal:  Musculoskeletal Findings: Normal   Skin:  Skin Findings: Normal    Mental Status:  Mental Status Findings: Normal        ASSESSMENT/PLAN:     OR 8/22 Interstim Removal   Will follow up with Dr Dominguez post op to discuss possible colostomy

## 2018-08-22 ENCOUNTER — HOSPITAL ENCOUNTER (OUTPATIENT)
Facility: HOSPITAL | Age: 58
Discharge: HOME | End: 2018-08-22
Attending: COLON & RECTAL SURGERY | Admitting: COLON & RECTAL SURGERY
Payer: COMMERCIAL

## 2018-08-22 ENCOUNTER — ANESTHESIA (OUTPATIENT)
Dept: SURGERY | Facility: HOSPITAL | Age: 58
End: 2018-08-22
Payer: COMMERCIAL

## 2018-08-22 VITALS
HEIGHT: 70 IN | BODY MASS INDEX: 30.64 KG/M2 | TEMPERATURE: 98 F | WEIGHT: 214 LBS | OXYGEN SATURATION: 98 % | HEART RATE: 60 BPM | DIASTOLIC BLOOD PRESSURE: 84 MMHG | SYSTOLIC BLOOD PRESSURE: 124 MMHG | RESPIRATION RATE: 16 BRPM

## 2018-08-22 DIAGNOSIS — R15.9 FULL INCONTINENCE OF FECES: ICD-10-CM

## 2018-08-22 DIAGNOSIS — R15.9 FECAL INCONTINENCE: Primary | ICD-10-CM

## 2018-08-22 PROCEDURE — 71000015 HC POSTOP RECOV 1ST HR: Performed by: COLON & RECTAL SURGERY

## 2018-08-22 PROCEDURE — 36000707: Performed by: COLON & RECTAL SURGERY

## 2018-08-22 PROCEDURE — 25000003 PHARM REV CODE 250: Performed by: COLON & RECTAL SURGERY

## 2018-08-22 PROCEDURE — 63600175 PHARM REV CODE 636 W HCPCS: Performed by: COLON & RECTAL SURGERY

## 2018-08-22 PROCEDURE — 63600175 PHARM REV CODE 636 W HCPCS: Performed by: NURSE ANESTHETIST, CERTIFIED REGISTERED

## 2018-08-22 PROCEDURE — D9220A PRA ANESTHESIA: Mod: CRNA,,, | Performed by: NURSE ANESTHETIST, CERTIFIED REGISTERED

## 2018-08-22 PROCEDURE — 37000009 HC ANESTHESIA EA ADD 15 MINS: Performed by: COLON & RECTAL SURGERY

## 2018-08-22 PROCEDURE — D9220A PRA ANESTHESIA: Mod: ANES,,, | Performed by: ANESTHESIOLOGY

## 2018-08-22 PROCEDURE — C9290 INJ, BUPIVACAINE LIPOSOME: HCPCS | Performed by: COLON & RECTAL SURGERY

## 2018-08-22 PROCEDURE — 64585 REV/RMV PERPH NSTIM ELTRD RA: CPT | Mod: ,,, | Performed by: COLON & RECTAL SURGERY

## 2018-08-22 PROCEDURE — 25000003 PHARM REV CODE 250: Performed by: NURSE PRACTITIONER

## 2018-08-22 PROCEDURE — 36000706: Performed by: COLON & RECTAL SURGERY

## 2018-08-22 PROCEDURE — 25000003 PHARM REV CODE 250: Performed by: NURSE ANESTHETIST, CERTIFIED REGISTERED

## 2018-08-22 PROCEDURE — 71000044 HC DOSC ROUTINE RECOVERY FIRST HOUR: Performed by: COLON & RECTAL SURGERY

## 2018-08-22 PROCEDURE — 37000008 HC ANESTHESIA 1ST 15 MINUTES: Performed by: COLON & RECTAL SURGERY

## 2018-08-22 RX ORDER — HYDROMORPHONE HYDROCHLORIDE 1 MG/ML
0.2 INJECTION, SOLUTION INTRAMUSCULAR; INTRAVENOUS; SUBCUTANEOUS EVERY 5 MIN PRN
Status: DISCONTINUED | OUTPATIENT
Start: 2018-08-22 | End: 2018-08-22 | Stop reason: HOSPADM

## 2018-08-22 RX ORDER — BUPIVACAINE HYDROCHLORIDE 2.5 MG/ML
INJECTION, SOLUTION EPIDURAL; INFILTRATION; INTRACAUDAL
Status: DISCONTINUED | OUTPATIENT
Start: 2018-08-22 | End: 2018-08-22 | Stop reason: HOSPADM

## 2018-08-22 RX ORDER — PROPOFOL 10 MG/ML
VIAL (ML) INTRAVENOUS CONTINUOUS PRN
Status: DISCONTINUED | OUTPATIENT
Start: 2018-08-22 | End: 2018-08-22

## 2018-08-22 RX ORDER — SODIUM CHLORIDE 0.9 % (FLUSH) 0.9 %
3 SYRINGE (ML) INJECTION
Status: DISCONTINUED | OUTPATIENT
Start: 2018-08-22 | End: 2018-08-22 | Stop reason: HOSPADM

## 2018-08-22 RX ORDER — FENTANYL CITRATE 50 UG/ML
INJECTION, SOLUTION INTRAMUSCULAR; INTRAVENOUS
Status: DISCONTINUED | OUTPATIENT
Start: 2018-08-22 | End: 2018-08-22

## 2018-08-22 RX ORDER — SODIUM CHLORIDE 9 MG/ML
INJECTION, SOLUTION INTRAVENOUS CONTINUOUS
Status: DISCONTINUED | OUTPATIENT
Start: 2018-08-22 | End: 2018-08-22 | Stop reason: HOSPADM

## 2018-08-22 RX ORDER — LIDOCAINE HCL/PF 100 MG/5ML
SYRINGE (ML) INTRAVENOUS
Status: DISCONTINUED | OUTPATIENT
Start: 2018-08-22 | End: 2018-08-22

## 2018-08-22 RX ORDER — OXYCODONE AND ACETAMINOPHEN 5; 325 MG/1; MG/1
1 TABLET ORAL EVERY 4 HOURS PRN
Qty: 30 TABLET | Refills: 0 | Status: ON HOLD | OUTPATIENT
Start: 2018-08-22 | End: 2018-10-18 | Stop reason: HOSPADM

## 2018-08-22 RX ORDER — KETAMINE HCL IN 0.9 % NACL 50 MG/5 ML
SYRINGE (ML) INTRAVENOUS
Status: DISCONTINUED | OUTPATIENT
Start: 2018-08-22 | End: 2018-08-22

## 2018-08-22 RX ORDER — OXYCODONE AND ACETAMINOPHEN 5; 325 MG/1; MG/1
1 TABLET ORAL EVERY 4 HOURS PRN
Qty: 30 TABLET | Refills: 0 | Status: SHIPPED | OUTPATIENT
Start: 2018-08-22 | End: 2018-08-22 | Stop reason: SDUPTHER

## 2018-08-22 RX ORDER — MIDAZOLAM HYDROCHLORIDE 1 MG/ML
INJECTION, SOLUTION INTRAMUSCULAR; INTRAVENOUS
Status: DISCONTINUED | OUTPATIENT
Start: 2018-08-22 | End: 2018-08-22

## 2018-08-22 RX ORDER — MUPIROCIN 20 MG/G
OINTMENT TOPICAL
Status: DISCONTINUED | OUTPATIENT
Start: 2018-08-22 | End: 2018-08-22 | Stop reason: HOSPADM

## 2018-08-22 RX ORDER — PROPOFOL 10 MG/ML
VIAL (ML) INTRAVENOUS
Status: DISCONTINUED | OUTPATIENT
Start: 2018-08-22 | End: 2018-08-22

## 2018-08-22 RX ADMIN — FENTANYL CITRATE 10 MCG: 50 INJECTION, SOLUTION INTRAMUSCULAR; INTRAVENOUS at 08:08

## 2018-08-22 RX ADMIN — FENTANYL CITRATE 5 MCG: 50 INJECTION, SOLUTION INTRAMUSCULAR; INTRAVENOUS at 08:08

## 2018-08-22 RX ADMIN — MIDAZOLAM HYDROCHLORIDE 2 MG: 1 INJECTION, SOLUTION INTRAMUSCULAR; INTRAVENOUS at 08:08

## 2018-08-22 RX ADMIN — FENTANYL CITRATE 25 MCG: 50 INJECTION, SOLUTION INTRAMUSCULAR; INTRAVENOUS at 08:08

## 2018-08-22 RX ADMIN — LIDOCAINE HYDROCHLORIDE 50 MG: 20 INJECTION, SOLUTION INTRAVENOUS at 08:08

## 2018-08-22 RX ADMIN — MUPIROCIN: 20 OINTMENT TOPICAL at 07:08

## 2018-08-22 RX ADMIN — Medication 20 MG: at 08:08

## 2018-08-22 RX ADMIN — PROPOFOL 50 MCG/KG/MIN: 10 INJECTION, EMULSION INTRAVENOUS at 08:08

## 2018-08-22 RX ADMIN — SODIUM CHLORIDE: 0.9 INJECTION, SOLUTION INTRAVENOUS at 07:08

## 2018-08-22 RX ADMIN — PROPOFOL 40 MG: 10 INJECTION, EMULSION INTRAVENOUS at 08:08

## 2018-08-22 NOTE — INTERVAL H&P NOTE
No change in H+P I have again explained the procedure including indications, alternatives, expected outcomes and potential complications. All questions were answered          Active Hospital Problems    Diagnosis  POA    Fecal incontinence [R15.9]  Yes      Resolved Hospital Problems   No resolved problems to display.

## 2018-08-22 NOTE — ANESTHESIA POSTPROCEDURE EVALUATION
"Anesthesia Post Evaluation    Patient: Murphy Angel    Procedure(s) Performed: Procedure(s) (LRB):  REMOVAL, NEUROSTIMULATOR, SACRAL (N/A)    Final Anesthesia Type: general  Patient location during evaluation: PACU  Patient participation: Yes- Able to Participate  Level of consciousness: awake and alert  Post-procedure vital signs: reviewed and stable  Pain management: adequate  Airway patency: patent  PONV status at discharge: No PONV  Anesthetic complications: no      Cardiovascular status: blood pressure returned to baseline  Respiratory status: unassisted, room air and spontaneous ventilation  Hydration status: euvolemic  Follow-up not needed.        Visit Vitals  /84 (BP Location: Left arm, Patient Position: Lying)   Pulse 60   Temp 36.6 °C (97.9 °F) (Temporal)   Resp 16   Ht 5' 10" (1.778 m)   Wt 97.1 kg (214 lb)   SpO2 98%   BMI 30.71 kg/m²       Pain/Conrad Score: Pain Assessment Performed: Yes (8/22/2018  9:42 AM)  Presence of Pain: denies (8/22/2018  9:42 AM)  Ocnrad Score: 10 (8/22/2018  9:42 AM)        "

## 2018-08-22 NOTE — BRIEF OP NOTE
Operative Note     SUMMARY     Surgery Date: 8/22/2018     Surgeon(s) and Role:     * Nacho Cortes MD - Primary     * Fabio Tipton MD - Assisting    Pre-op Diagnosis:  Full incontinence of feces [R15.9]    Post-op Diagnosis:  Post-Op Diagnosis Codes:     * Full incontinence of feces [R15.9]    Procedure(s) (LRB):  REMOVAL, NEUROSTIMULATOR, SACRAL (N/A)    Anesthesia: General    Description of Procedure:   reomval interstim    Findings/Key Components:       Estimated Blood Loss:  10         Specimens (From admission, onward)    None            Discharge Note      SUMMARY     Admit Date: 8/22/2018    Attending Physician: No att. providers found     Discharge Physician: No att. providers found    Discharge Date: 8/22/2018     Final Diagnosis: Post-Op Diagnosis Codes:     * Full incontinence of feces [R15.9]    Hospital Course: Patient was admitted for an outpatient procedure and tolerated the procedure well with no complications.    Disposition: Home or Self Care    Follow Up/Patient Instructions:   Discharge Medication List as of 8/22/2018  9:05 AM      START taking these medications    Details   !! oxyCODONE-acetaminophen (PERCOCET) 5-325 mg per tablet Take 1 tablet by mouth every 4 (four) hours as needed for Pain., Starting Wed 8/22/2018, Print       !! - Potential duplicate medications found. Please discuss with provider.      CONTINUE these medications which have NOT CHANGED    Details   diphenoxylate-atropine 2.5-0.025 mg (LOMOTIL) 2.5-0.025 mg per tablet TAKE ONE TO TWO TABLETS BY MOUTH 4 TIMES DAILY AS NEEDED FOR DIARRHEA, Normal      DULCOLAX, BISACODYL, ORAL Take by mouth., Historical Med      levothyroxine (SYNTHROID) 50 MCG tablet Take 1 tablet by mouth once daily., Starting 2/13/2017, Until Discontinued, Historical Med      morphine 10 mg/5 mL solution Take 1 mL (2 mg total) by mouth every 6 (six) hours as needed for Pain., Starting Wed 5/23/2018, Normal      polyethylene glycol (GLYCOLAX) 17  gram/dose powder Starting Thu 2/8/2018, Historical Med      pravastatin (PRAVACHOL) 80 MG tablet Take 1 tablet by mouth once daily., Starting 2/13/2017, Until Discontinued, Historical Med      !! oxyCODONE-acetaminophen (PERCOCET) 5-325 mg per tablet Take 1 tablet by mouth every 6 (six) hours as needed for Pain., Starting Mon 8/6/2018, Print       !! - Potential duplicate medications found. Please discuss with provider.          Discharge Procedure Orders (must include Diet, Follow-up, Activity)   Discharge Procedure Orders (must include Diet, Follow-up, Activity)   Diet Adult Regular     Notify your health care provider if you experience any of the following:  temperature >100.4     Notify your health care provider if you experience any of the following:  persistent nausea and vomiting or diarrhea     Notify your health care provider if you experience any of the following:  severe uncontrolled pain     Notify your health care provider if you experience any of the following:  redness, tenderness, or signs of infection (pain, swelling, redness, odor or green/yellow discharge around incision site)     Notify your health care provider if you experience any of the following:  difficulty breathing or increased cough     Notify your health care provider if you experience any of the following:  worsening rash     No dressing needed   Order Comments: Your incision site has been closed with dermabond and requires no additional dressing, it will peel over the next couple of weeks on its own. You may shower and run water over the site and pat dry, please do not have wound soaking in still water     Weight bearing restrictions (specify):   Order Comments: No heavy lifting greater than 10 pounds      Activity ad rose  Regular diet   Rtc 2 jay

## 2018-08-22 NOTE — BRIEF OP NOTE
.  [facility]  Brief Operative Note     SUMMARY     Surgery Date: 8/22/2018     Surgeon(s) and Role:     * Nacho Cortes MD - Primary     * Fabio Tipton MD - Assisting        Pre-op Diagnosis:  Full incontinence of feces [R15.9]    Post-op Diagnosis:  Post-Op Diagnosis Codes:     * Full incontinence of feces [R15.9]    Procedure(s) (LRB):  REMOVAL, NEUROSTIMULATOR, SACRAL (N/A)    Anesthesia: General    Description of the findings of the procedure: see op note    Findings/Key Components:  Please see op note    Estimated Blood Loss: * No values recorded between 8/22/2018  8:48 AM and 8/22/2018  8:59 AM *         Specimens:   Specimen (12h ago, onward)    None          Discharge Note    SUMMARY     Admit Date: 8/22/2018    Discharge Date and Time:  08/22/2018 9:00 AM    Hospital Course (synopsis of major diagnoses, care, treatment, and services provided during the course of the hospital stay): Interim removed on 8.22 without issue, follow-up in outpatient clinic in 2 weeks.     Final Diagnosis: Post-Op Diagnosis Codes:     * Full incontinence of feces [R15.9]    Disposition: Home or Self Care    Follow Up/Patient Instructions:     Medications:  Reconciled Home Medications:      Medication List      CHANGE how you take these medications    * oxyCODONE-acetaminophen 5-325 mg per tablet  Commonly known as:  PERCOCET  Take 1 tablet by mouth every 6 (six) hours as needed for Pain.  What changed:  Another medication with the same name was added. Make sure you understand how and when to take each.     * oxyCODONE-acetaminophen 5-325 mg per tablet  Commonly known as:  PERCOCET  Take 1 tablet by mouth every 4 (four) hours as needed for Pain.  What changed:  You were already taking a medication with the same name, and this prescription was added. Make sure you understand how and when to take each.         * This list has 2 medication(s) that are the same as other medications prescribed for you.  Read the directions carefully, and ask your doctor or other care provider to review them with you.            CONTINUE taking these medications    diphenoxylate-atropine 2.5-0.025 mg 2.5-0.025 mg per tablet  Commonly known as:  LOMOTIL  TAKE ONE TO TWO TABLETS BY MOUTH 4 TIMES DAILY AS NEEDED FOR DIARRHEA     DULCOLAX (BISACODYL) ORAL  Take by mouth.     levothyroxine 50 MCG tablet  Commonly known as:  SYNTHROID  Take 1 tablet by mouth once daily.     morphine 10 mg/5 mL solution  Take 1 mL (2 mg total) by mouth every 6 (six) hours as needed for Pain.     polyethylene glycol 17 gram/dose powder  Commonly known as:  GLYCOLAX     pravastatin 80 MG tablet  Commonly known as:  PRAVACHOL  Take 1 tablet by mouth once daily.          Discharge Procedure Orders   Diet Adult Regular     Notify your health care provider if you experience any of the following:  temperature >100.4     Notify your health care provider if you experience any of the following:  persistent nausea and vomiting or diarrhea     Notify your health care provider if you experience any of the following:  severe uncontrolled pain     Notify your health care provider if you experience any of the following:  redness, tenderness, or signs of infection (pain, swelling, redness, odor or green/yellow discharge around incision site)     Notify your health care provider if you experience any of the following:  difficulty breathing or increased cough     Notify your health care provider if you experience any of the following:  worsening rash     No dressing needed   Order Comments: Your incision site has been closed with dermabond and requires no additional dressing, it will peel over the next couple of weeks on its own. You may shower and run water over the site and pat dry, please do not have wound soaking in still water     Weight bearing restrictions (specify):   Order Comments: No heavy lifting greater than 10 pounds     Follow-up Information     Nacho RODRIGUES  MD Sophia In 2 weeks.    Specialty:  Colon and Rectal Surgery  Why:  wound check after neurostim removal   Contact information:  [address]  [facility] [address] [phone]               activity ad rose  Regular diet  RTC 2 weeks

## 2018-08-22 NOTE — TRANSFER OF CARE
"Anesthesia Transfer of Care Note    Patient: Murphy Angel    Procedure(s) Performed: Procedure(s) (LRB):  REMOVAL, NEUROSTIMULATOR, SACRAL (N/A)    Patient location: PACU    Anesthesia Type: general    Transport from OR: Transported from OR on room air with adequate spontaneous ventilation    Post pain: adequate analgesia    Post assessment: no apparent anesthetic complications and tolerated procedure well    Post vital signs: stable    Level of consciousness: awake, alert and oriented    Nausea/Vomiting: no nausea/vomiting    Complications: none    Transfer of care protocol was followed      Last vitals:   Visit Vitals  /87 (BP Location: Left arm, Patient Position: Lying)   Pulse 64   Temp 36.6 °C (97.9 °F) (Temporal)   Resp 16   Ht 5' 10" (1.778 m)   Wt 97.1 kg (214 lb)   SpO2 99%   BMI 30.71 kg/m²     "

## 2018-08-23 NOTE — OP NOTE
DATE OF PROCEDURE:  08/22/2018    PREOPERATIVE DIAGNOSIS:  Fecal incontinence.    POSTOPERATIVE DIAGNOSIS:  Failed InterStim stage I.    PROCEDURE:  Extraction of InterStim leads.    SURGEON:  Nacho Cortes M.D.    RESIDENT:  Saeed.    ANESTHESIA:  Local with IV sedation.    INDICATION:  Mr. Angel is a 57-year-old male with fecal incontinence, status   post low anterior resection, had an InterStim stage I placed, which did not   improve his continence.  Based on this, surgery was indicated.    PROCEDURE IN DETAIL:  The patient was placed in a supine position under   monitored anesthesia care, back was prepped and draped in a sterile manner.  The   pocket where the site of the lead was, was incised.  The lead was cut and the   external lead was able to fall off the table.  The internal and under gentle   traction was able to be removed completely.  Hemostasis was assured.  The pocket   was closed in 2 layers.  Exparel was placed.  Dermabond was applied.  Sponge   and needle counts were correct.  The patient tolerated the procedure and   transported to Recovery Room in stable condition.      DAM/HN  dd: 08/22/2018 19:35:27 (CDT)  td: 08/22/2018 19:47:47 (CDT)  Doc ID   #[id number]  Job ID #[id number]    CC:

## 2018-09-06 ENCOUNTER — OFFICE VISIT (OUTPATIENT)
Dept: SURGERY | Facility: CLINIC | Age: 58
End: 2018-09-06
Payer: COMMERCIAL

## 2018-09-06 VITALS
HEIGHT: 70 IN | SYSTOLIC BLOOD PRESSURE: 143 MMHG | HEART RATE: 58 BPM | BODY MASS INDEX: 30.99 KG/M2 | DIASTOLIC BLOOD PRESSURE: 89 MMHG | WEIGHT: 216.5 LBS

## 2018-09-06 DIAGNOSIS — Z85.048 PERSONAL HISTORY OF MALIGNANT NEOPLASM OF RECTUM, RECTOSIGMOID JUNCTION, AND ANUS: ICD-10-CM

## 2018-09-06 DIAGNOSIS — R15.0 INCOMPLETE DEFECATION: Primary | ICD-10-CM

## 2018-09-06 PROCEDURE — 3008F BODY MASS INDEX DOCD: CPT | Mod: CPTII,S$GLB,, | Performed by: COLON & RECTAL SURGERY

## 2018-09-06 PROCEDURE — 99214 OFFICE O/P EST MOD 30 MIN: CPT | Mod: S$GLB,,, | Performed by: COLON & RECTAL SURGERY

## 2018-09-06 PROCEDURE — 99999 PR PBB SHADOW E&M-EST. PATIENT-LVL III: CPT | Mod: PBBFAC,,, | Performed by: COLON & RECTAL SURGERY

## 2018-09-06 RX ORDER — NEOMYCIN SULFATE 500 MG/1
500 TABLET ORAL SEE ADMIN INSTRUCTIONS
Qty: 3 TABLET | Refills: 0 | Status: ON HOLD | OUTPATIENT
Start: 2018-09-06 | End: 2018-10-18 | Stop reason: HOSPADM

## 2018-09-06 RX ORDER — METRONIDAZOLE 500 MG/1
500 TABLET ORAL SEE ADMIN INSTRUCTIONS
Qty: 3 TABLET | Refills: 0 | Status: ON HOLD | OUTPATIENT
Start: 2018-09-06 | End: 2018-10-18 | Stop reason: HOSPADM

## 2018-09-06 NOTE — PROGRESS NOTES
"History & Physical    SUBJECTIVE:     History of Present Illness:  Patient is a 57 y.o. male     5/17 LAR with DLI for colon cancer by Dr Dominguez. 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 He underwent stage 1 interstim on 8/6.   He has had no improvement in his symptoms and had the interstim removed. He has "no quality of life." Has clustering and up to 15 bowel movements daily. Occasional incontinence to liquid stool and regularly to gas.      No chief complaint on file.      Review of patient's allergies indicates:  No Known Allergies    Current Outpatient Medications   Medication Sig Dispense Refill    diphenoxylate-atropine 2.5-0.025 mg (LOMOTIL) 2.5-0.025 mg per tablet TAKE ONE TO TWO TABLETS BY MOUTH 4 TIMES DAILY AS NEEDED FOR DIARRHEA 120 tablet 3    DULCOLAX, BISACODYL, ORAL Take by mouth.      levothyroxine (SYNTHROID) 50 MCG tablet Take 1 tablet by mouth once daily.      polyethylene glycol (GLYCOLAX) 17 gram/dose powder       pravastatin (PRAVACHOL) 80 MG tablet Take 1 tablet by mouth once daily.      morphine 10 mg/5 mL solution Take 1 mL (2 mg total) by mouth every 6 (six) hours as needed for Pain. 100 mL 0    oxyCODONE-acetaminophen (PERCOCET) 5-325 mg per tablet Take 1 tablet by mouth every 4 (four) hours as needed for Pain. 30 tablet 0     No current facility-administered medications for this visit.        Past Medical History:   Diagnosis Date    Cancer     Thyroid disease      Past Surgical History:   Procedure Laterality Date    COLON SURGERY  06/23/2017    Ileostomy Closure    COLON SURGERY  05/01/2017    Resection-Colon & Rectal Low Anterior    EYE SURGERY      Prosetheic eye placed-right    illeostomy       Family History   Problem Relation Age of Onset    Anesthesia problems Neg Hx      Social History     Tobacco Use    Smoking status: Former Smoker     Packs/day: 1.00     Years: 20.00     Pack years: 20.00     Types: Cigarettes     Last attempt to quit: " "2017     Years since quittin.3    Smokeless tobacco: Never Used   Substance Use Topics    Alcohol use: Yes     Comment: occasionally    Drug use: No        Review of Systems:           Review of Systems  Anesthesia Hx:  No problems with previous Anesthesia   Hematology/Oncology:  Hematology Normal        Cardiovascular:  Cardiovascular Normal    Renal/:  Renal/ Normal     Musculoskeletal:  Musculoskeletal Normal    Neurological:  Neurology Normal    Dermatological:  Skin Normal    Psych:  Psychiatric Normal           OBJECTIVE:     Vital Signs (Most Recent)  Pulse: (!) 58 (18 0843)  BP: (!) 143/89 (18 0843)  5' 10" (1.778 m)  98.2 kg (216 lb 7.9 oz)     Physical Exam:    Physical Exam  General:  Well nourished, Obesity    Airway/Jaw/Neck:  AIRWAY FINDINGS: Normal      Eyes/Ears/Nose:  EYES/EARS/NOSE FINDINGS: Normal   Dental:  DENTAL FINDINGS: Normal   Chest/Lungs:  Chest/Lungs Clear    Heart/Vascular:  Heart Findings: Normal Heart murmur: negative Vascular Findings: Normal    Abdomen:  Abdomen Findings:  Soft, Nontender  +hernia at ileostomy site     Musculoskeletal:  Musculoskeletal Findings: Normal   Skin:  Skin Findings: Normal    Mental Status:  Mental Status Findings: Normal        ASSESSMENT/PLAN:     Murphy Angel is a 57 y.o. male with LAR syndrome. Discussed options including enema therapy and patient desires a colostomy. Will plan for laparoscopic/possible open colostomy creation and incisional hernia repair.    RTC prior to surgery for stoma site marking  Continue current medical management    Pt's condition explained to patient and his wife at length, and he will benefit from a colostomy.  Risk and rationale for this procedure have been explained to them at length.  All questions answered.  Informed consent has been obtained and pre-operative orders written.  We will plan to proceed with surgery on 10/15.  Pt may call back with any questions or concerns in the interim.   "           I have explained the procedure including indications, alternatives, expected outcomes and potential complications. The patient appears to understand and gives informed consent. The patient will be evaluated by the preop center  Have seen and examined the patient with the fellow and agree with their plan.  GREGG RANDALL

## 2018-09-07 ENCOUNTER — ANESTHESIA EVENT (OUTPATIENT)
Dept: SURGERY | Facility: HOSPITAL | Age: 58
DRG: 331 | End: 2018-09-07
Payer: COMMERCIAL

## 2018-09-07 NOTE — PRE ADMISSION SCREENING
Anesthesia Assessment: Preoperative EQUATION    Planned Procedure: Procedure(s) (LRB):  COLOSTOMY,LAPAROSCOPIC (N/A)  REPAIR,HERNIA,INCISIONAL OR VENTRAL,WITHOUT HISTORY OF PRIOR REPAIR (Right)  Requested Anesthesia Type:General  Surgeon: Nacho Cortes MD  Service: Colon and Rectal  Known or anticipated Date of Surgery:10/15/2018    Surgeon notes: reviewed and Incomplete defecation    Previous anesthesia records:8/22/18-Removal Neurostimulator Sacral-General    Last PCP note: within 1 month , outside [facility] , focused visit   Subspecialty notes: Hematology/Oncology, , Wound Care-Enterostomal  Tests already available:  Results have been reviewed.In Media tab-labs-CBC & BMP on 8/10/18 & EKG      Plan:    Testing:  None needed at this time     Patient  has previously scheduled Medical Appointment:Appointment on 10/5/18, prior to surgery date.    Navigation:           Consults scheduled.PCP?           Results will be tracked by Preop Clinic.                 Straight Line to surgery.               No tests, anesthesia preop clinic visit, or consult required.                  Tabitha Church RN  9/7/18   Attending Only

## 2018-09-07 NOTE — ANESTHESIA PREPROCEDURE EVALUATION
"   Anesthesia Assessment: Preoperative EQUATION    Planned Procedure: Procedure(s) (LRB):  COLOSTOMY,LAPAROSCOPIC (N/A)  REPAIR,HERNIA,INCISIONAL OR VENTRAL,WITHOUT HISTORY OF PRIOR REPAIR (Right)  Requested Anesthesia Type:General  Surgeon: Nacho Cortes MD  Service: Colon and Rectal  Known or anticipated Date of Surgery:10/15/2018    Surgeon notes: reviewed and Incomplete defecation    Previous anesthesia records:8/22/18-Removal Neurostimulator Sacral-General    Last PCP note: within 1 month , outside [facility] , focused visit   Subspecialty notes: Hematology/Oncology, , Wound Care-Enterostomal  Tests already available:  Results have been reviewed.In Media tab-labs-CBC & BMP on 8/10/18 & EKG      Plan:    Testing:  None needed at this time     Patient  has previously scheduled Medical Appointment:Appointment on 10/5/18, prior to surgery date.    Navigation:    Phone Completed                            Straight Line to surgery.               No tests, anesthesia preop clinic visit, or consult required.     Reviewed plan with anesthesia-Per : Patient did well with anesthesia with recent C&R surgery on 8/22/18, and is lisy-operatively ready for next C&R surgery on 10/15/18 and no "Clearance" is needed at this time.               Tabitha Church RN  9/7/18 09/07/2018  Murphy Angel is a 58 y.o., male   with a pre-operative diagnosis of Incomplete defecation [R15.0] who is scheduled for Procedure(s) (LRB):  CREATION, COLOSTOMY, LAPAROSCOPIC (N/A)  REPAIR, HERNIA, INCISIONAL OR VENTRAL, WITHOUT HISTORY OF PRIOR REPAIR (Right).     Requested anesthesia type: General  Surgeon: Nacho Cortes MD  Allergies: Review of patient's allergies indicates:  No Known Allergies  Vital Sign Range: Temp:  [36.7 °C (98 °F)] 36.7 °C (98 °F)  Pulse:  [63] 63  Resp:  [16] 16  SpO2:  [99 %] 99 %  BP: (125)/(86) " 125/86  Chronic Medications:   Medications Prior to Admission   Medication Sig Dispense Refill Last Dose    diphenoxylate-atropine 2.5-0.025 mg (LOMOTIL) 2.5-0.025 mg per tablet TAKE ONE TO TWO TABLETS BY MOUTH 4 TIMES DAILY AS NEEDED FOR DIARRHEA 120 tablet 3 Past Week at Unknown time    DULCOLAX, BISACODYL, ORAL Take by mouth.   10/14/2018 at 2000    levothyroxine (SYNTHROID) 50 MCG tablet Take 1 tablet by mouth once daily.   10/15/2018 at 0300    metroNIDAZOLE (FLAGYL) 500 MG tablet Take 1 tablet (500 mg total) by mouth As instructed (Take at 1pm, 2pm and 11pm the day before surgery). 3 tablet 0 10/14/2018 at 2300    neomycin (MYCIFRADIN) 500 mg Tab Take 1 tablet (500 mg total) by mouth As instructed (Take at 1pm, 2pm and 11pm the day before surgery). 3 tablet 0 10/14/2018 at 2300    polyethylene glycol (GLYCOLAX) 17 gram/dose powder    10/14/2018 at 2000    pravastatin (PRAVACHOL) 80 MG tablet Take 1 tablet by mouth once daily.   10/15/2018 at 0300    morphine 10 mg/5 mL solution Take 1 mL (2 mg total) by mouth every 6 (six) hours as needed for Pain. 100 mL 0 More than a month at Unknown time    oxyCODONE-acetaminophen (PERCOCET) 5-325 mg per tablet Take 1 tablet by mouth every 4 (four) hours as needed for Pain. 30 tablet 0 More than a month at Unknown time     Current Medications:   Current Facility-Administered Medications   Medication Dose Route Frequency Provider Last Rate Last Dose    0.9%  NaCl infusion   Intravenous Continuous Siria Schulz NP        cefTRIAXone (ROCEPHIN) 2 g in dextrose 5 % 50 mL IVPB  2 g Intravenous On Call Procedure Siria Schulz NP        And    metronidazole IVPB 500 mg  500 mg Intravenous On Call Procedure Siria Schulz NP        dextrose 5 % and 0.45 % NaCl infusion   Intravenous Continuous Nacho Cortes MD        dextrose 5 % and 0.45 % NaCl infusion   Intravenous Continuous Nacho Cortes MD 20 mL/hr at 10/15/18 1014      mupirocin 2  % ointment   Nasal On Call Procedure Siria Schulz NP        sodium chloride 0.9% flush 3 mL  3 mL Intravenous PRN Nacho Cortes MD         Medical History:   Past Medical History:   Diagnosis Date    Cancer     Thyroid disease      .    Anesthesia Evaluation    I have reviewed the Patient Summary Reports.    I have reviewed the Nursing Notes.   I have reviewed the Medications.     Review of Systems  Anesthesia Hx:  No problems with previous Anesthesia  Denies Family Hx of Anesthesia complications.   Denies Personal Hx of Anesthesia complications.   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   Pulmonary:  Pulmonary Normal Sleep Apnea, CPAP Uses C-PAP & will bring DOS   Renal/:  Renal/ Normal     Hepatic/GI:  Hepatic/GI Normal Hemorrhoids/Bowel dysfunction/Fecal incontinence   Musculoskeletal:  Musculoskeletal Normal    Neurological:  Neurology Normal    Endocrine:  Endocrine Normal    Dermatological:  Skin Normal    Psych:  Psychiatric Normal           Physical Exam  General:  Obesity    Airway/Jaw/Neck:  Airway Findings: Mouth Opening: Normal Tongue: Normal  General Airway Assessment: Adult, Good  Mallampati: II  Jaw/Neck Findings:     Neck ROM: Normal ROM      Dental:  Dental Findings: In tact, upper front caps   Chest/Lungs:  Chest/Lungs Findings: Clear to auscultation, Normal Respiratory Rate     Heart/Vascular:  Heart Findings: Rate: Normal  Rhythm: Regular Rhythm  Sounds: Normal     Abdomen:  Abdomen Findings:  Normal, Soft, Nontender        Tabitha Church RN  9/7/18    Anesthesia Plan  Type of Anesthesia, risks & benefits discussed:  Anesthesia Type:  general  Patient's Preference:  as indicated  Intra-op Monitoring Plan: standard ASA monitors  Intra-op Monitoring Plan Comments:   Post Op Pain Control Plan: per primary service following discharge from PACU  Post Op Pain Control Plan Comments:   Induction:   IV  Beta Blocker:  Patient is not currently on a Beta-Blocker (No further documentation required).       Informed Consent: Patient understands risks and agrees with Anesthesia plan.  Questions answered. Anesthesia consent signed with patient.  ASA Score: 2     Day of Surgery Review of History & Physical:  There are no significant changes.  H&P update referred to the provider.     Anesthesia Plan Notes: Reassurance given.        Ready For Surgery From Anesthesia Perspective.

## 2018-09-11 NOTE — OP NOTE
Operative Note       Surgery Date: 6/23/2017     Staff surgeon: Surgeon(s) and Role:     * Nacho Dominguez MD - Primary     * Khalida Livingston MD - Resident - Assisting    Surgeon: Khalida Franklin    Pre-op Diagnosis:  Ileostomy in place [Z93.2]  History of rectal cancer [Z85.048]    Post-op Diagnosis:  Ileostomy in place [Z93.2]  History of rectal cancer [Z85.048]    Procedure:  Procedure(s) (LRB):  CLOSURE-ILEOSTOMY (N/A)    Anesthesia: General    Findings:  Loop ileostomy in place    Specimen:  None    Estimated Blood Loss: 20cc    Procedure in detail: after informed consent was reviewed and patient marked. he was brought back to the operating room, positioned in Supine position, and after adequate anesthesia, a time out was done appropriately to identify two patient identifiers, site, side and procedure to be performed, it was confirmed that preoperative antibiotics were administered.           The stoma was sharply dissected from skin. This was divided down to the level of the fascia. All stoma adhesions were divided. The stoma was then stapled with a NICKI 60 blue load. We closed the common enterotomy with running vicryl suture and 4-0 ethibond running lembert, in two layers. This loop of bowel was reduced into the peritoneum. experal was infiltrated into the fascia. The fascia was closed with two interrupted figure of eight PDS sutures. Skin closed with 2-0 monocryl to leave a small 1cm opening, which was then packed and dressed with guaze and tegaderm.            Disposition: PACU - hemodynamically stable.           Condition: Good      
Declined

## 2018-09-18 ENCOUNTER — TELEPHONE (OUTPATIENT)
Dept: SURGERY | Facility: CLINIC | Age: 58
End: 2018-09-18

## 2018-09-18 NOTE — TELEPHONE ENCOUNTER
----- Message from Kemi Soto, CNS sent at 9/18/2018  9:48 AM CDT -----  Hate to do this to ya but I have to go take care of mom oct 5th, just foundout and there is a DM pt to be marked?? Can you arrange that for another day by any chance , sure hope so  Sorry for the hassle

## 2018-10-08 ENCOUNTER — OFFICE VISIT (OUTPATIENT)
Dept: WOUND CARE | Facility: CLINIC | Age: 58
End: 2018-10-08
Payer: COMMERCIAL

## 2018-10-08 DIAGNOSIS — Z43.3 ATTENTION TO COLOSTOMY: Primary | ICD-10-CM

## 2018-10-08 PROCEDURE — 99024 POSTOP FOLLOW-UP VISIT: CPT | Mod: S$GLB,,, | Performed by: CLINICAL NURSE SPECIALIST

## 2018-10-08 PROCEDURE — 99999 PR PBB SHADOW E&M-EST. PATIENT-LVL II: CPT | Mod: PBBFAC,,, | Performed by: CLINICAL NURSE SPECIALIST

## 2018-10-15 ENCOUNTER — HOSPITAL ENCOUNTER (INPATIENT)
Facility: HOSPITAL | Age: 58
LOS: 3 days | Discharge: HOME | DRG: 331 | End: 2018-10-18
Attending: COLON & RECTAL SURGERY | Admitting: COLON & RECTAL SURGERY
Payer: COMMERCIAL

## 2018-10-15 ENCOUNTER — ANESTHESIA (OUTPATIENT)
Dept: SURGERY | Facility: HOSPITAL | Age: 58
DRG: 331 | End: 2018-10-15
Payer: COMMERCIAL

## 2018-10-15 DIAGNOSIS — R15.9 COMPLETE FECAL INCONTINENCE: ICD-10-CM

## 2018-10-15 DIAGNOSIS — R15.9 FULL INCONTINENCE OF FECES: Primary | ICD-10-CM

## 2018-10-15 LAB
ABO + RH BLD: NORMAL
BLD GP AB SCN CELLS X3 SERPL QL: NORMAL

## 2018-10-15 PROCEDURE — 86850 RBC ANTIBODY SCREEN: CPT

## 2018-10-15 PROCEDURE — D9220A PRA ANESTHESIA: Mod: CRNA,,, | Performed by: NURSE ANESTHETIST, CERTIFIED REGISTERED

## 2018-10-15 PROCEDURE — S0030 INJECTION, METRONIDAZOLE: HCPCS | Performed by: NURSE PRACTITIONER

## 2018-10-15 PROCEDURE — S5010 5% DEXTROSE AND 0.45% SALINE: HCPCS | Performed by: COLON & RECTAL SURGERY

## 2018-10-15 PROCEDURE — 88302 TISSUE EXAM BY PATHOLOGIST: CPT | Performed by: PATHOLOGY

## 2018-10-15 PROCEDURE — 25000003 PHARM REV CODE 250: Performed by: NURSE ANESTHETIST, CERTIFIED REGISTERED

## 2018-10-15 PROCEDURE — 36000710: Performed by: COLON & RECTAL SURGERY

## 2018-10-15 PROCEDURE — 37000009 HC ANESTHESIA EA ADD 15 MINS: Performed by: COLON & RECTAL SURGERY

## 2018-10-15 PROCEDURE — 94761 N-INVAS EAR/PLS OXIMETRY MLT: CPT

## 2018-10-15 PROCEDURE — 49561 PR REPAIR INCISIONAL HERNIA,STRANG: CPT | Mod: 51,79,, | Performed by: COLON & RECTAL SURGERY

## 2018-10-15 PROCEDURE — 20600001 HC STEP DOWN PRIVATE ROOM

## 2018-10-15 PROCEDURE — 63600175 PHARM REV CODE 636 W HCPCS: Performed by: SURGERY

## 2018-10-15 PROCEDURE — 25000003 PHARM REV CODE 250: Performed by: STUDENT IN AN ORGANIZED HEALTH CARE EDUCATION/TRAINING PROGRAM

## 2018-10-15 PROCEDURE — D9220A PRA ANESTHESIA: Mod: ANES,,, | Performed by: ANESTHESIOLOGY

## 2018-10-15 PROCEDURE — 63600175 PHARM REV CODE 636 W HCPCS: Performed by: NURSE ANESTHETIST, CERTIFIED REGISTERED

## 2018-10-15 PROCEDURE — 25000003 PHARM REV CODE 250: Performed by: COLON & RECTAL SURGERY

## 2018-10-15 PROCEDURE — C9290 INJ, BUPIVACAINE LIPOSOME: HCPCS | Performed by: COLON & RECTAL SURGERY

## 2018-10-15 PROCEDURE — 25000003 PHARM REV CODE 250: Performed by: NURSE PRACTITIONER

## 2018-10-15 PROCEDURE — 36000711: Performed by: COLON & RECTAL SURGERY

## 2018-10-15 PROCEDURE — 88302 TISSUE EXAM BY PATHOLOGIST: CPT | Mod: 26,,, | Performed by: PATHOLOGY

## 2018-10-15 PROCEDURE — 71000033 HC RECOVERY, INTIAL HOUR: Performed by: COLON & RECTAL SURGERY

## 2018-10-15 PROCEDURE — 63600175 PHARM REV CODE 636 W HCPCS: Performed by: ANESTHESIOLOGY

## 2018-10-15 PROCEDURE — 63600175 PHARM REV CODE 636 W HCPCS: Performed by: COLON & RECTAL SURGERY

## 2018-10-15 PROCEDURE — S0020 INJECTION, BUPIVICAINE HYDRO: HCPCS | Performed by: COLON & RECTAL SURGERY

## 2018-10-15 PROCEDURE — 63600175 PHARM REV CODE 636 W HCPCS: Performed by: NURSE PRACTITIONER

## 2018-10-15 PROCEDURE — 71000039 HC RECOVERY, EACH ADD'L HOUR: Performed by: COLON & RECTAL SURGERY

## 2018-10-15 PROCEDURE — 0WQF0ZZ REPAIR ABDOMINAL WALL, OPEN APPROACH: ICD-10-PCS | Performed by: COLON & RECTAL SURGERY

## 2018-10-15 PROCEDURE — 37000008 HC ANESTHESIA 1ST 15 MINUTES: Performed by: COLON & RECTAL SURGERY

## 2018-10-15 PROCEDURE — 27201423 OPTIME MED/SURG SUP & DEVICES STERILE SUPPLY: Performed by: COLON & RECTAL SURGERY

## 2018-10-15 PROCEDURE — 0D1M0Z4 BYPASS DESCENDING COLON TO CUTANEOUS, OPEN APPROACH: ICD-10-PCS | Performed by: COLON & RECTAL SURGERY

## 2018-10-15 PROCEDURE — 63600175 PHARM REV CODE 636 W HCPCS: Performed by: STUDENT IN AN ORGANIZED HEALTH CARE EDUCATION/TRAINING PROGRAM

## 2018-10-15 PROCEDURE — 44188 LAP COLOSTOMY: CPT | Mod: 58,,, | Performed by: COLON & RECTAL SURGERY

## 2018-10-15 PROCEDURE — 27201037 HC PRESSURE MONITORING SET UP

## 2018-10-15 RX ORDER — SODIUM CHLORIDE 0.9 % (FLUSH) 0.9 %
3 SYRINGE (ML) INJECTION
Status: ACTIVE | OUTPATIENT
Start: 2018-10-15

## 2018-10-15 RX ORDER — IBUPROFEN 600 MG/1
600 TABLET ORAL 3 TIMES DAILY
Status: DISCONTINUED | OUTPATIENT
Start: 2018-10-15 | End: 2018-10-18 | Stop reason: HOSPADM

## 2018-10-15 RX ORDER — MUPIROCIN 20 MG/G
OINTMENT TOPICAL
Status: ACTIVE | OUTPATIENT
Start: 2018-10-15

## 2018-10-15 RX ORDER — BUPIVACAINE HYDROCHLORIDE 5 MG/ML
INJECTION, SOLUTION EPIDURAL; INTRACAUDAL
Status: DISCONTINUED | OUTPATIENT
Start: 2018-10-15 | End: 2018-10-15 | Stop reason: HOSPADM

## 2018-10-15 RX ORDER — MIDAZOLAM HYDROCHLORIDE 1 MG/ML
INJECTION, SOLUTION INTRAMUSCULAR; INTRAVENOUS
Status: DISCONTINUED | OUTPATIENT
Start: 2018-10-15 | End: 2018-10-15

## 2018-10-15 RX ORDER — HYDROMORPHONE HYDROCHLORIDE 1 MG/ML
0.2 INJECTION, SOLUTION INTRAMUSCULAR; INTRAVENOUS; SUBCUTANEOUS EVERY 5 MIN PRN
Status: DISCONTINUED | OUTPATIENT
Start: 2018-10-15 | End: 2018-10-15 | Stop reason: HOSPADM

## 2018-10-15 RX ORDER — GLYCOPYRROLATE 0.2 MG/ML
INJECTION INTRAMUSCULAR; INTRAVENOUS
Status: DISCONTINUED | OUTPATIENT
Start: 2018-10-15 | End: 2018-10-15

## 2018-10-15 RX ORDER — ENOXAPARIN SODIUM 100 MG/ML
40 INJECTION SUBCUTANEOUS DAILY
Status: DISCONTINUED | OUTPATIENT
Start: 2018-10-16 | End: 2018-10-18 | Stop reason: HOSPADM

## 2018-10-15 RX ORDER — DEXTROSE MONOHYDRATE AND SODIUM CHLORIDE 5; .45 G/100ML; G/100ML
INJECTION, SOLUTION INTRAVENOUS CONTINUOUS
Status: DISCONTINUED | OUTPATIENT
Start: 2018-10-15 | End: 2018-10-16

## 2018-10-15 RX ORDER — HYDROCODONE BITARTRATE AND ACETAMINOPHEN 5; 325 MG/1; MG/1
1 TABLET ORAL EVERY 4 HOURS PRN
Status: DISCONTINUED | OUTPATIENT
Start: 2018-10-15 | End: 2018-10-18 | Stop reason: HOSPADM

## 2018-10-15 RX ORDER — SODIUM CHLORIDE, SODIUM LACTATE, POTASSIUM CHLORIDE, CALCIUM CHLORIDE 600; 310; 30; 20 MG/100ML; MG/100ML; MG/100ML; MG/100ML
INJECTION, SOLUTION INTRAVENOUS CONTINUOUS
Status: DISCONTINUED | OUTPATIENT
Start: 2018-10-15 | End: 2018-10-18

## 2018-10-15 RX ORDER — LIDOCAINE HCL/PF 100 MG/5ML
SYRINGE (ML) INTRAVENOUS
Status: DISCONTINUED | OUTPATIENT
Start: 2018-10-15 | End: 2018-10-15

## 2018-10-15 RX ORDER — ONDANSETRON 2 MG/ML
INJECTION INTRAMUSCULAR; INTRAVENOUS
Status: DISCONTINUED | OUTPATIENT
Start: 2018-10-15 | End: 2018-10-15

## 2018-10-15 RX ORDER — FENTANYL CITRATE 50 UG/ML
25 INJECTION, SOLUTION INTRAMUSCULAR; INTRAVENOUS EVERY 5 MIN PRN
Status: COMPLETED | OUTPATIENT
Start: 2018-10-15 | End: 2018-10-15

## 2018-10-15 RX ORDER — MEPERIDINE HYDROCHLORIDE 50 MG/ML
12.5 INJECTION INTRAMUSCULAR; INTRAVENOUS; SUBCUTANEOUS ONCE AS NEEDED
Status: DISCONTINUED | OUTPATIENT
Start: 2018-10-15 | End: 2018-10-15 | Stop reason: HOSPADM

## 2018-10-15 RX ORDER — ACETAMINOPHEN 10 MG/ML
1000 INJECTION, SOLUTION INTRAVENOUS
Status: COMPLETED | OUTPATIENT
Start: 2018-10-15 | End: 2018-10-15

## 2018-10-15 RX ORDER — LORAZEPAM 2 MG/ML
0.25 INJECTION INTRAMUSCULAR ONCE AS NEEDED
Status: DISCONTINUED | OUTPATIENT
Start: 2018-10-15 | End: 2018-10-15 | Stop reason: HOSPADM

## 2018-10-15 RX ORDER — ACETAMINOPHEN 10 MG/ML
1000 INJECTION, SOLUTION INTRAVENOUS EVERY 8 HOURS
Status: DISPENSED | OUTPATIENT
Start: 2018-10-15 | End: 2018-10-16

## 2018-10-15 RX ORDER — KETAMINE HYDROCHLORIDE 10 MG/ML
INJECTION, SOLUTION INTRAMUSCULAR; INTRAVENOUS
Status: DISCONTINUED | OUTPATIENT
Start: 2018-10-15 | End: 2018-10-15

## 2018-10-15 RX ORDER — ONDANSETRON 2 MG/ML
4 INJECTION INTRAMUSCULAR; INTRAVENOUS EVERY 12 HOURS PRN
Status: DISCONTINUED | OUTPATIENT
Start: 2018-10-15 | End: 2018-10-18 | Stop reason: HOSPADM

## 2018-10-15 RX ORDER — LEVOTHYROXINE SODIUM 25 UG/1
50 TABLET ORAL DAILY
Status: DISCONTINUED | OUTPATIENT
Start: 2018-10-16 | End: 2018-10-18 | Stop reason: HOSPADM

## 2018-10-15 RX ORDER — ROCURONIUM BROMIDE 10 MG/ML
INJECTION, SOLUTION INTRAVENOUS
Status: DISCONTINUED | OUTPATIENT
Start: 2018-10-15 | End: 2018-10-15

## 2018-10-15 RX ORDER — HYDROMORPHONE HYDROCHLORIDE 1 MG/ML
INJECTION, SOLUTION INTRAMUSCULAR; INTRAVENOUS; SUBCUTANEOUS
Status: DISPENSED
Start: 2018-10-15 | End: 2018-10-16

## 2018-10-15 RX ORDER — HEPARIN SODIUM 5000 [USP'U]/ML
5000 INJECTION, SOLUTION INTRAVENOUS; SUBCUTANEOUS
Status: COMPLETED | OUTPATIENT
Start: 2018-10-15 | End: 2018-10-15

## 2018-10-15 RX ORDER — ONDANSETRON 2 MG/ML
4 INJECTION INTRAMUSCULAR; INTRAVENOUS DAILY PRN
Status: DISCONTINUED | OUTPATIENT
Start: 2018-10-15 | End: 2018-10-15 | Stop reason: HOSPADM

## 2018-10-15 RX ORDER — NALOXONE HCL 0.4 MG/ML
0.02 VIAL (ML) INJECTION
Status: DISCONTINUED | OUTPATIENT
Start: 2018-10-15 | End: 2018-10-18 | Stop reason: HOSPADM

## 2018-10-15 RX ORDER — NEOSTIGMINE METHYLSULFATE 1 MG/ML
INJECTION, SOLUTION INTRAVENOUS
Status: DISCONTINUED | OUTPATIENT
Start: 2018-10-15 | End: 2018-10-15

## 2018-10-15 RX ORDER — MUPIROCIN 20 MG/G
1 OINTMENT TOPICAL 2 TIMES DAILY
Status: DISCONTINUED | OUTPATIENT
Start: 2018-10-15 | End: 2018-10-18 | Stop reason: HOSPADM

## 2018-10-15 RX ORDER — FENTANYL CITRATE 50 UG/ML
INJECTION, SOLUTION INTRAMUSCULAR; INTRAVENOUS
Status: DISPENSED
Start: 2018-10-15 | End: 2018-10-16

## 2018-10-15 RX ORDER — METRONIDAZOLE 500 MG/100ML
500 INJECTION, SOLUTION INTRAVENOUS
Status: COMPLETED | OUTPATIENT
Start: 2018-10-15 | End: 2018-10-15

## 2018-10-15 RX ORDER — PROPOFOL 10 MG/ML
VIAL (ML) INTRAVENOUS
Status: DISCONTINUED | OUTPATIENT
Start: 2018-10-15 | End: 2018-10-15

## 2018-10-15 RX ORDER — DEXAMETHASONE SODIUM PHOSPHATE 4 MG/ML
INJECTION, SOLUTION INTRA-ARTICULAR; INTRALESIONAL; INTRAMUSCULAR; INTRAVENOUS; SOFT TISSUE
Status: DISCONTINUED | OUTPATIENT
Start: 2018-10-15 | End: 2018-10-15

## 2018-10-15 RX ORDER — HYDROMORPHONE HYDROCHLORIDE 1 MG/ML
1 INJECTION, SOLUTION INTRAMUSCULAR; INTRAVENOUS; SUBCUTANEOUS EVERY 4 HOURS PRN
Status: DISCONTINUED | OUTPATIENT
Start: 2018-10-15 | End: 2018-10-18 | Stop reason: HOSPADM

## 2018-10-15 RX ORDER — SODIUM CHLORIDE 9 MG/ML
INJECTION, SOLUTION INTRAVENOUS CONTINUOUS
Status: DISCONTINUED | OUTPATIENT
Start: 2018-10-15 | End: 2018-10-16

## 2018-10-15 RX ORDER — FENTANYL CITRATE 50 UG/ML
INJECTION, SOLUTION INTRAMUSCULAR; INTRAVENOUS
Status: DISCONTINUED | OUTPATIENT
Start: 2018-10-15 | End: 2018-10-15

## 2018-10-15 RX ORDER — HYDROCODONE BITARTRATE AND ACETAMINOPHEN 5; 325 MG/1; MG/1
TABLET ORAL
Status: DISPENSED
Start: 2018-10-15 | End: 2018-10-16

## 2018-10-15 RX ORDER — PRAVASTATIN SODIUM 40 MG/1
80 TABLET ORAL DAILY
Status: DISCONTINUED | OUTPATIENT
Start: 2018-10-16 | End: 2018-10-18 | Stop reason: HOSPADM

## 2018-10-15 RX ADMIN — KETAMINE HYDROCHLORIDE 50 MG: 10 INJECTION, SOLUTION INTRAMUSCULAR; INTRAVENOUS at 01:10

## 2018-10-15 RX ADMIN — ROCURONIUM BROMIDE 50 MG: 10 INJECTION, SOLUTION INTRAVENOUS at 12:10

## 2018-10-15 RX ADMIN — ROCURONIUM BROMIDE 30 MG: 10 INJECTION, SOLUTION INTRAVENOUS at 02:10

## 2018-10-15 RX ADMIN — HYDROCODONE BITARTRATE AND ACETAMINOPHEN 1 TABLET: 5; 325 TABLET ORAL at 03:10

## 2018-10-15 RX ADMIN — SODIUM CHLORIDE, SODIUM LACTATE, POTASSIUM CHLORIDE, AND CALCIUM CHLORIDE: .6; .31; .03; .02 INJECTION, SOLUTION INTRAVENOUS at 03:10

## 2018-10-15 RX ADMIN — HYDROMORPHONE HYDROCHLORIDE 0.2 MG: 1 INJECTION, SOLUTION INTRAMUSCULAR; INTRAVENOUS; SUBCUTANEOUS at 04:10

## 2018-10-15 RX ADMIN — SODIUM CHLORIDE, SODIUM GLUCONATE, SODIUM ACETATE, POTASSIUM CHLORIDE, MAGNESIUM CHLORIDE, SODIUM PHOSPHATE, DIBASIC, AND POTASSIUM PHOSPHATE: .53; .5; .37; .037; .03; .012; .00082 INJECTION, SOLUTION INTRAVENOUS at 01:10

## 2018-10-15 RX ADMIN — IBUPROFEN 600 MG: 600 TABLET ORAL at 10:10

## 2018-10-15 RX ADMIN — FENTANYL CITRATE 25 MCG: 50 INJECTION INTRAMUSCULAR; INTRAVENOUS at 03:10

## 2018-10-15 RX ADMIN — NEOSTIGMINE METHYLSULFATE 5 MG: 1 INJECTION INTRAVENOUS at 03:10

## 2018-10-15 RX ADMIN — FENTANYL CITRATE 100 MCG: 50 INJECTION, SOLUTION INTRAMUSCULAR; INTRAVENOUS at 12:10

## 2018-10-15 RX ADMIN — MIDAZOLAM HYDROCHLORIDE 2 MG: 1 INJECTION, SOLUTION INTRAMUSCULAR; INTRAVENOUS at 12:10

## 2018-10-15 RX ADMIN — SODIUM CHLORIDE, SODIUM GLUCONATE, SODIUM ACETATE, POTASSIUM CHLORIDE, MAGNESIUM CHLORIDE, SODIUM PHOSPHATE, DIBASIC, AND POTASSIUM PHOSPHATE: .53; .5; .37; .037; .03; .012; .00082 INJECTION, SOLUTION INTRAVENOUS at 12:10

## 2018-10-15 RX ADMIN — FENTANYL CITRATE 25 MCG: 50 INJECTION, SOLUTION INTRAMUSCULAR; INTRAVENOUS at 03:10

## 2018-10-15 RX ADMIN — MUPIROCIN 1 G: 20 OINTMENT TOPICAL at 10:10

## 2018-10-15 RX ADMIN — DEXTROSE AND SODIUM CHLORIDE: 5; .45 INJECTION, SOLUTION INTRAVENOUS at 10:10

## 2018-10-15 RX ADMIN — DEXAMETHASONE SODIUM PHOSPHATE 8 MG: 4 INJECTION, SOLUTION INTRAMUSCULAR; INTRAVENOUS at 01:10

## 2018-10-15 RX ADMIN — HEPARIN SODIUM 5000 UNITS: 5000 INJECTION, SOLUTION INTRAVENOUS; SUBCUTANEOUS at 10:10

## 2018-10-15 RX ADMIN — GLYCOPYRROLATE 0.6 MG: 0.2 INJECTION, SOLUTION INTRAMUSCULAR; INTRAVENOUS at 03:10

## 2018-10-15 RX ADMIN — FENTANYL CITRATE 50 MCG: 50 INJECTION, SOLUTION INTRAMUSCULAR; INTRAVENOUS at 02:10

## 2018-10-15 RX ADMIN — IBUPROFEN 400 MG: 800 INJECTION INTRAVENOUS at 10:10

## 2018-10-15 RX ADMIN — FENTANYL CITRATE 25 MCG: 50 INJECTION INTRAMUSCULAR; INTRAVENOUS at 04:10

## 2018-10-15 RX ADMIN — ACETAMINOPHEN 1000 MG: 10 INJECTION, SOLUTION INTRAVENOUS at 10:10

## 2018-10-15 RX ADMIN — MUPIROCIN: 20 OINTMENT TOPICAL at 10:10

## 2018-10-15 RX ADMIN — ONDANSETRON 4 MG: 2 INJECTION INTRAMUSCULAR; INTRAVENOUS at 03:10

## 2018-10-15 RX ADMIN — CEFTRIAXONE 2 G: 2 INJECTION, SOLUTION INTRAVENOUS at 12:10

## 2018-10-15 RX ADMIN — PROPOFOL 200 MG: 10 INJECTION, EMULSION INTRAVENOUS at 12:10

## 2018-10-15 RX ADMIN — METRONIDAZOLE 500 MG: 500 SOLUTION INTRAVENOUS at 12:10

## 2018-10-15 RX ADMIN — LIDOCAINE HYDROCHLORIDE 100 MG: 20 INJECTION, SOLUTION INTRAVENOUS at 12:10

## 2018-10-15 RX ADMIN — HYDROCODONE BITARTRATE AND ACETAMINOPHEN 1 TABLET: 5; 325 TABLET ORAL at 07:10

## 2018-10-15 NOTE — ANESTHESIA RELEASE NOTE
"Anesthesia Release from PACU Note    Patient: Murphy Angel    Procedure(s) Performed: Procedure(s) (LRB):  CREATION, COLOSTOMY, LAPAROSCOPIC (N/A)  REPAIR, HERNIA, INCISIONAL OR VENTRAL, WITHOUT HISTORY OF PRIOR REPAIR (Right)    Anesthesia type: GEN    Post pain: Adequate analgesia reported    Post assessment: no apparent anesthetic complications, tolerated procedure well and no evidence of recall    Post vital signs: BP (!) 151/94   Pulse 78   Temp 36.7 °C (98 °F) (Temporal)   Resp 14   Ht 5' 10" (1.778 m)   Wt 98 kg (216 lb)   SpO2 95%   BMI 30.99 kg/m²     Level of consciousness: awake, alert and oriented    Nausea/Vomiting: no nausea/no vomiting    Complications: none    Airway Patency: patent    Respiratory: unassisted, spontaneous ventilation, room air    Cardiovascular: stable and blood pressure at baseline    Hydration: euvolemic    "

## 2018-10-15 NOTE — TRANSFER OF CARE
"Anesthesia Transfer of Care Note    Patient: Murphy Angel    Procedure(s) Performed: Procedure(s) (LRB):  CREATION, COLOSTOMY, LAPAROSCOPIC (N/A)  REPAIR, HERNIA, INCISIONAL OR VENTRAL, WITHOUT HISTORY OF PRIOR REPAIR (Right)    Patient location: PACU    Anesthesia Type: general    Transport from OR: Transported from OR on 6-10 L/min O2 by face mask with adequate spontaneous ventilation    Post pain: adequate analgesia    Post assessment: tolerated procedure well and no apparent anesthetic complications    Post vital signs: stable    Level of consciousness: awake and alert    Nausea/Vomiting: no nausea/vomiting    Complications: none    Transfer of care protocol was followed      Last vitals:   Visit Vitals  BP (!) 142/88   Pulse 88   Temp 36.5 °C (97.7 °F) (Temporal)   Resp 16   Ht 5' 10" (1.778 m)   Wt 98 kg (216 lb)   SpO2 100%   BMI 30.99 kg/m²     "

## 2018-10-15 NOTE — H&P
"History of Present Illness:  Patient is a 57 y.o. male      5/17 LAR with DLI for colon cancer by Dr Dominguez. 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 He underwent stage 1 interstim on 8/6.   He has had no improvement in his symptoms and had the interstim removed. He has "no quality of life." Has clustering and up to 15 bowel movements daily. Occasional incontinence to liquid stool and regularly to gas.        No chief complaint on file.        Review of patient's allergies indicates:  No Known Allergies     Current Medications          Current Outpatient Medications   Medication Sig Dispense Refill    diphenoxylate-atropine 2.5-0.025 mg (LOMOTIL) 2.5-0.025 mg per tablet TAKE ONE TO TWO TABLETS BY MOUTH 4 TIMES DAILY AS NEEDED FOR DIARRHEA 120 tablet 3    DULCOLAX, BISACODYL, ORAL Take by mouth.        levothyroxine (SYNTHROID) 50 MCG tablet Take 1 tablet by mouth once daily.        polyethylene glycol (GLYCOLAX) 17 gram/dose powder          pravastatin (PRAVACHOL) 80 MG tablet Take 1 tablet by mouth once daily.        morphine 10 mg/5 mL solution Take 1 mL (2 mg total) by mouth every 6 (six) hours as needed for Pain. 100 mL 0    oxyCODONE-acetaminophen (PERCOCET) 5-325 mg per tablet Take 1 tablet by mouth every 4 (four) hours as needed for Pain. 30 tablet 0      No current facility-administered medications for this visit.                  Past Medical History:   Diagnosis Date    Cancer      Thyroid disease              Past Surgical History:   Procedure Laterality Date    COLON SURGERY   06/23/2017     Ileostomy Closure    COLON SURGERY   05/01/2017     Resection-Colon & Rectal Low Anterior    EYE SURGERY         Prosetheic eye placed-right    illeostomy                Family History   Problem Relation Age of Onset    Anesthesia problems Neg Hx        Social History            Tobacco Use    Smoking status: Former Smoker       Packs/day: 1.00       Years: 20.00       Pack years: " "20.00       Types: Cigarettes       Last attempt to quit: 2017       Years since quittin.3    Smokeless tobacco: Never Used   Substance Use Topics    Alcohol use: Yes       Comment: occasionally    Drug use: No         Review of Systems:             Review of Systems  Anesthesia Hx:  No problems with previous Anesthesia   Hematology/Oncology:  Hematology Normal         Cardiovascular:  Cardiovascular Normal    Renal/:  Renal/ Normal     Musculoskeletal:  Musculoskeletal Normal    Neurological:  Neurology Normal    Dermatological:  Skin Normal    Psych:  Psychiatric Normal              OBJECTIVE:      Vital Signs (Most Recent)  Pulse: (!) 58 (18)  BP: (!) 143/89 (18)  5' 10" (1.778 m)  98.2 kg (216 lb 7.9 oz)      Physical Exam:     Physical Exam  General:  Well nourished, Obesity    Airway/Jaw/Neck:  AIRWAY FINDINGS: Normal      Eyes/Ears/Nose:  EYES/EARS/NOSE FINDINGS: Normal   Dental:  DENTAL FINDINGS: Normal   Chest/Lungs:  Chest/Lungs Clear    Heart/Vascular:  Heart Findings: Normal Heart murmur: negative Vascular Findings: Normal    Abdomen:  Abdomen Findings:  Soft, Nontender  +hernia at ileostomy site     Musculoskeletal:  Musculoskeletal Findings: Normal   Skin:  Skin Findings: Normal    Mental Status:  Mental Status Findings: Normal          ASSESSMENT/PLAN:      Murphy Angel is a 57 y.o. male with LAR syndrome. Discussed options including enema therapy and patient desires a colostomy. Will plan for laparoscopic/possible open colostomy creation and incisional hernia repair.     RTC prior to surgery for stoma site marking  Continue current medical management     Pt's condition explained to patient and his wife at length, and he will benefit from a colostomy.  Risk and rationale for this procedure have been explained to them at length.  All questions answered.  Informed consent has been obtained and pre-operative orders written.  We will plan to proceed with surgery on " 10/15.  Pt may call back with any questions or concerns in the interim.                I have explained the procedure including indications, alternatives, expected outcomes and potential complications. The patient appears to understand and gives informed consent. The patient will be evaluated by the preop center.  No change in H+P I have again explained the procedure including indications, alternatives, expected outcomes and potential complications. All questions were answered

## 2018-10-15 NOTE — ANESTHESIA POSTPROCEDURE EVALUATION
"Anesthesia Post Evaluation    Patient: Murphy Angel    Procedure(s) Performed: Procedure(s) (LRB):  CREATION, COLOSTOMY, LAPAROSCOPIC (N/A)  REPAIR, HERNIA, INCISIONAL OR VENTRAL, WITHOUT HISTORY OF PRIOR REPAIR (Right)    Final Anesthesia Type: general  Patient location during evaluation: PACU  Patient participation: Yes- Able to Participate  Level of consciousness: awake and alert and oriented  Post-procedure vital signs: reviewed and stable  Pain management: adequate  Airway patency: patent  PONV status at discharge: No PONV  Anesthetic complications: no      Cardiovascular status: stable  Respiratory status: unassisted, spontaneous ventilation and room air  Hydration status: euvolemic  Follow-up not needed.        Visit Vitals  BP (!) 151/94   Pulse 78   Temp 36.7 °C (98 °F) (Temporal)   Resp 14   Ht 5' 10" (1.778 m)   Wt 98 kg (216 lb)   SpO2 95%   BMI 30.99 kg/m²       Pain/Conrad Score: Pain Assessment Performed: Yes (10/15/2018  5:00 PM)  Presence of Pain: complains of pain/discomfort (10/15/2018  5:00 PM)  Pain Rating Prior to Med Admin: 6 (10/15/2018  4:40 PM)  Pain Rating Post Med Admin: 5 (10/15/2018  5:00 PM)  Conrad Score: 10 (10/15/2018  5:00 PM)        "

## 2018-10-15 NOTE — BRIEF OP NOTE
[facility]  Brief Operative Note    SUMMARY     Surgery Date: 10/15/2018     Surgeon(s) and Role:     * Nacho Cortes MD - Primary     * Gini Barclay MD - Resident - Assisting     * Charles Acosta MD - Fellow        Pre-Operative Care:  Pre-operative bowel prep Mechanical, PO antibiotics and N/A  IV antibiotics given within 1 hour of incision? Yes  Preoperative multimodal pain control? Orfirmev, Calador and TAP    Pre-op Diagnosis:  Incomplete defecation [R15.0]    Operative Care:  Post-op Diagnosis: Post-Op Diagnosis Codes:     * Incomplete defecation [R15.0]    Procedure(s) (LRB):  CREATION, COLOSTOMY, LAPAROSCOPIC (N/A)  REPAIR, HERNIA, INCISIONAL OR VENTRAL, WITHOUT HISTORY OF PRIOR REPAIR (Right)    Anesthesia: General  Total volume administered 2000   Total UOP: 300     Technical Procedures Used:    Use of closing instrument setup? No  Gown/Glove Change @ time of closing? No  Suction tip change at time of closing? No  Wound Protector used if open case? N/A    Description of the findings of the procedure:    Wound Class (Contaminated    Complications: No     Estimated Blood Loss: 100 mL            Specimens:   Specimen (12h ago, onward)    None          Implants: * No implants in log *    Post-Operative Care:         Disposition: PACU - hemodynamically stable.           Condition: Stable  PT Temp >36 upon leaving the OR? Yes

## 2018-10-16 PROBLEM — Z93.3 COLOSTOMY IN PLACE: Status: ACTIVE | Noted: 2018-10-16

## 2018-10-16 LAB
ANION GAP SERPL CALC-SCNC: 8 MMOL/L
BASOPHILS # BLD AUTO: 0.01 K/UL
BASOPHILS NFR BLD: 0.1 %
BUN SERPL-MCNC: 11 MG/DL
CALCIUM SERPL-MCNC: 8.9 MG/DL
CHLORIDE SERPL-SCNC: 106 MMOL/L
CO2 SERPL-SCNC: 25 MMOL/L
CREAT SERPL-MCNC: 0.9 MG/DL
DIFFERENTIAL METHOD: ABNORMAL
EOSINOPHIL # BLD AUTO: 0 K/UL
EOSINOPHIL NFR BLD: 0 %
ERYTHROCYTE [DISTWIDTH] IN BLOOD BY AUTOMATED COUNT: 11.7 %
EST. GFR  (AFRICAN AMERICAN): >60 ML/MIN/1.73 M^2
EST. GFR  (NON AFRICAN AMERICAN): >60 ML/MIN/1.73 M^2
GLUCOSE SERPL-MCNC: 113 MG/DL
HCT VFR BLD AUTO: 37 %
HGB BLD-MCNC: 12.9 G/DL
IMM GRANULOCYTES # BLD AUTO: 0.05 K/UL
IMM GRANULOCYTES NFR BLD AUTO: 0.5 %
LYMPHOCYTES # BLD AUTO: 0.9 K/UL
LYMPHOCYTES NFR BLD: 7.9 %
MAGNESIUM SERPL-MCNC: 2.1 MG/DL
MCH RBC QN AUTO: 31.3 PG
MCHC RBC AUTO-ENTMCNC: 34.9 G/DL
MCV RBC AUTO: 90 FL
MONOCYTES # BLD AUTO: 1 K/UL
MONOCYTES NFR BLD: 8.9 %
NEUTROPHILS # BLD AUTO: 8.9 K/UL
NEUTROPHILS NFR BLD: 82.6 %
NRBC BLD-RTO: 0 /100 WBC
PHOSPHATE SERPL-MCNC: 3.9 MG/DL
PLATELET # BLD AUTO: 210 K/UL
PMV BLD AUTO: 10.2 FL
POTASSIUM SERPL-SCNC: 4.6 MMOL/L
RBC # BLD AUTO: 4.12 M/UL
SODIUM SERPL-SCNC: 139 MMOL/L
WBC # BLD AUTO: 10.8 K/UL

## 2018-10-16 PROCEDURE — 25000003 PHARM REV CODE 250: Performed by: STUDENT IN AN ORGANIZED HEALTH CARE EDUCATION/TRAINING PROGRAM

## 2018-10-16 PROCEDURE — 85025 COMPLETE CBC W/AUTO DIFF WBC: CPT

## 2018-10-16 PROCEDURE — 84100 ASSAY OF PHOSPHORUS: CPT

## 2018-10-16 PROCEDURE — 80048 BASIC METABOLIC PNL TOTAL CA: CPT

## 2018-10-16 PROCEDURE — 83735 ASSAY OF MAGNESIUM: CPT

## 2018-10-16 PROCEDURE — 36415 COLL VENOUS BLD VENIPUNCTURE: CPT

## 2018-10-16 PROCEDURE — 63600175 PHARM REV CODE 636 W HCPCS: Performed by: STUDENT IN AN ORGANIZED HEALTH CARE EDUCATION/TRAINING PROGRAM

## 2018-10-16 PROCEDURE — 20600001 HC STEP DOWN PRIVATE ROOM

## 2018-10-16 RX ADMIN — SODIUM CHLORIDE, SODIUM LACTATE, POTASSIUM CHLORIDE, AND CALCIUM CHLORIDE: .6; .31; .03; .02 INJECTION, SOLUTION INTRAVENOUS at 01:10

## 2018-10-16 RX ADMIN — HYDROCODONE BITARTRATE AND ACETAMINOPHEN 1 TABLET: 5; 325 TABLET ORAL at 01:10

## 2018-10-16 RX ADMIN — IBUPROFEN 600 MG: 600 TABLET ORAL at 09:10

## 2018-10-16 RX ADMIN — SODIUM CHLORIDE, SODIUM LACTATE, POTASSIUM CHLORIDE, AND CALCIUM CHLORIDE: .6; .31; .03; .02 INJECTION, SOLUTION INTRAVENOUS at 12:10

## 2018-10-16 RX ADMIN — LEVOTHYROXINE SODIUM 50 MCG: 25 TABLET ORAL at 05:10

## 2018-10-16 RX ADMIN — SODIUM CHLORIDE, SODIUM LACTATE, POTASSIUM CHLORIDE, AND CALCIUM CHLORIDE: .6; .31; .03; .02 INJECTION, SOLUTION INTRAVENOUS at 09:10

## 2018-10-16 RX ADMIN — IBUPROFEN 600 MG: 600 TABLET ORAL at 02:10

## 2018-10-16 RX ADMIN — PRAVASTATIN SODIUM 80 MG: 40 TABLET ORAL at 09:10

## 2018-10-16 RX ADMIN — HYDROMORPHONE HYDROCHLORIDE 1 MG: 1 INJECTION, SOLUTION INTRAMUSCULAR; INTRAVENOUS; SUBCUTANEOUS at 12:10

## 2018-10-16 RX ADMIN — ACETAMINOPHEN 1000 MG: 10 INJECTION, SOLUTION INTRAVENOUS at 05:10

## 2018-10-16 RX ADMIN — ENOXAPARIN SODIUM 40 MG: 100 INJECTION SUBCUTANEOUS at 09:10

## 2018-10-16 RX ADMIN — HYDROCODONE BITARTRATE AND ACETAMINOPHEN 1 TABLET: 5; 325 TABLET ORAL at 09:10

## 2018-10-16 NOTE — PROGRESS NOTES
[facility]  Colorectal Surgery  Progress Note    Patient Name: Murphy Angel  MRN: [id number]  Admission Date: 10/15/2018  Hospital Length of Stay: 1 days  Attending Physician: Nacho Cortes MD    Subjective:     Interval History: Tolerating clears, voiding. Has not ambulated yet. Pain controlled.     Post-Op Info:  Procedure(s) (LRB):  CREATION, COLOSTOMY, LAPAROSCOPIC (N/A)  REPAIR, HERNIA, INCISIONAL OR VENTRAL, WITHOUT HISTORY OF PRIOR REPAIR (Right)   1 Day Post-Op      Medications:  Continuous Infusions:   lactated ringers 100 mL/hr at 10/16/18 0105     Scheduled Meds:   acetaminophen  1,000 mg Intravenous Q8H    enoxaparin  40 mg Subcutaneous Daily    ibuprofen  600 mg Oral TID    levothyroxine  50 mcg Oral Daily    mupirocin  1 g Nasal BID    pravastatin  80 mg Oral Daily     PRN Meds:   HYDROcodone-acetaminophen    HYDROmorphone    mupirocin    naloxone    ondansetron    sodium chloride 0.9%        Objective:     Vital Signs (Most Recent):  Temp: 97.6 °F (36.4 °C) (10/16/18 0714)  Pulse: 68 (10/16/18 0714)  Resp: 17 (10/16/18 0714)  BP: 110/70 (10/16/18 0714)  SpO2: 98 % (10/16/18 0714) Vital Signs (24h Range):  Temp:  [97.5 °F (36.4 °C)-98.2 °F (36.8 °C)] 97.6 °F (36.4 °C)  Pulse:  [60-92] 68  Resp:  [10-20] 17  SpO2:  [93 %-100 %] 98 %  BP: (109-151)/(55-98) 110/70     Intake/Output - Last 3 Shifts       10/14 0700 - 10/15 0659 10/15 0700 - 10/16 0659 10/16 0700 - 10/17 0659    P.O.  120     I.V. (mL/kg)  2500 (25.5)     Total Intake(mL/kg)  2620 (26.7)     Urine (mL/kg/hr)  475 (0.2) 104 (0.3)    Blood  100     Total Output  575 104    Net  +2045 -104                 Physical Exam   Constitutional: He is oriented to person, place, and time. He appears well-developed and well-nourished.   Eyes: Conjunctivae and EOM are normal.   Pulmonary/Chest: Effort normal. No respiratory distress.   Abdominal: Soft. He exhibits distension (mild). There is tenderness (expected post  op).   Colostomy well budded, dark in appearence (Sophia aware)  No output.   Genitourinary: Rectal exam shows no mass and no tenderness.   Musculoskeletal: Normal range of motion.   Neurological: He is alert and oriented to person, place, and time.   Skin: Skin is warm and dry.   Psychiatric: He has a normal mood and affect. His behavior is normal.     Significant Labs:  BMP (Last 3 Results):   Recent Labs   Lab  10/16/18   0459   GLU  113*   NA  139   K  4.6   CL  106   CO2  25   BUN  11   CREATININE  0.9   CALCIUM  8.9   MG  2.1     CBC (Last 3 Results):   Recent Labs   Lab  10/16/18   0459   WBC  10.80   RBC  4.12*   HGB  12.9*   HCT  37.0*   PLT  210   MCV  90   MCH  31.3*   MCHC  34.9     CMP (Last 3 Results):   Recent Labs   Lab  10/16/18   0459   GLU  113*   CALCIUM  8.9   NA  139   K  4.6   CO2  25   CL  106   BUN  11   CREATININE  0.9     CRP (Last 3 Results): No results for input(s): CRP in the last 168 hours.    Significant Diagnostics:  None    Assessment/Plan:     * Complete fecal incontinence    POD 1 Colostomy     Remove luna  Clear Liquids  Amublate TID  Pain control  Ostomy teaching               Siria Schulz NP  Colorectal Surgery  [facility]

## 2018-10-16 NOTE — PLAN OF CARE
SW following for d/c needs. Pt expected to d/c home with home health. SW will send a referral when orders are written.        Jose Alvarez, MSW, [facility]  [facility]  [id number]

## 2018-10-16 NOTE — OP NOTE
DATE OF PROCEDURE:  10/15/2018    PREOPERATIVE DIAGNOSES:  Fecal incontinence, stoma site hernia.    POSTOPERATIVE DIAGNOSES:  Fecal incontinence, stoma site hernia.    PROCEDURES:  Laparoscopic loop sigmoid colostomy, repair of stoma site hernia.    SURGEON:  Nacho Cortes M.D.    RESIDENTS:  Farideh and resident Deny.    ANESTHESIA:  General endotracheal.    INDICATIONS:  Mr. Angel is a 58-year-old male previously with low anterior   resection, coloanal, who is incontinent despite advanced therapy.  Based on   this, surgery was indicated.    PROCEDURE IN DETAIL:  The patient was brought to the Operating Room and placed   on the table in a modified lithotomy position.  All pressure points were padded.    Abdomen was prepped and draped in a sterile manner.  Irrigation was performed.    Using a Veress needle in the left upper quadrant, pneumoperitoneum was   established.  Then, using Optiview technique, an 11 mm trocar was placed distal   to the hernia site.  There were some adhesions to the anterior abdominal wall.    These were taken down sharply with the addition of a 5 mm trocar and a 15 mm   trocar in the patient's left side.  Once this was done, a balloon tipped trocar   was placed in the midline and another 5 mm trocar was placed on the right side.    All adhesions were then taken down.  The hernia, which had bowel trapped in it,   was reduced and the edges were freshened.  At this point, the patient was then   placed in Trendelenburg and rotated to his right.  The colon was then mobilized   up from the right gutter.  Care was taken to identify the ureter and retracted   inferiorly.  It was carried all the way up to the level of the splenic flexure.    This gave adequate mobilization for the colon.  Pneumoperitoneum was released   and the colon was then brought out in a loop fashion through the 15 mm trocar   site, which was enlarged.  A stoma j carlos was placed.  Pneumoperitoneum was   reinstilled to assure  the stoma was not twisted.  Please note that prior to any   manipulation, a TAPS block with Marcaine, Exparel, and saline was placed.  Once   this was done, the pneumoperitoneum was released.  All trocar sites were   removed.  The 12 mm and the 11 mm trocar site were closed with Anshul-Pinky   needle.  The hernia was then addressed.  The skin was incised over the hernia   defect.  The defect was closed with a series of figure-of-eight PDS sutures.    Skin incisions were all closed with 4-0 Monocryl and Dermabond.  The stoma was   then matured circumferentially in a Guera fashion.  Sponge and needle counts   were correct.  The patient tolerated the procedure and transported to the   Recovery Room in stable condition.      JOHNNA/MARVA  dd: 10/15/2018 19:45:25 (CDT)  td: 10/15/2018 20:07:17 (CDT)  Doc ID   #[id number]  Job ID #[id number]    CC:

## 2018-10-16 NOTE — PLAN OF CARE
Problem: Patient Care Overview  Goal: Plan of Care Review  Outcome: Ongoing (interventions implemented as appropriate)  Plan of care reviewed with patient. Verbalizes understanding. AAOx4. VSS. Patient denies any pain at this time. Tolerating low fiber/residue diet. Lap sites and incision intact with dermabond. Adequate urine output noted. Colostomy back intact remains with output of bowel sweat. Ambulated in ro[place]berg with stand by assistance remains free of any falls or trauma. Patient has no other concerns at this time. Call light within reach. WCTM.

## 2018-10-16 NOTE — PROGRESS NOTES
Client arrived to room 1034 via stretcher from the PACU with RN, Sourav. Vital signs are stable. AAOx4. PRN Lortab is being given for pain. Abdominal lap sites x 5 along with hernia incision are C/D/I. LLQ abdominal colostomy is intact with no output. Abdomen round but not distended. SCD's are intact. Free from falls/injuries. No acute distress noted. Patient is sleeping in bed with call light within reach. Will continue to monitor.

## 2018-10-16 NOTE — SUBJECTIVE & OBJECTIVE
Subjective:     Interval History: Tolerating clears, voiding. Has not ambulated yet. Pain controlled.     Post-Op Info:  Procedure(s) (LRB):  CREATION, COLOSTOMY, LAPAROSCOPIC (N/A)  REPAIR, HERNIA, INCISIONAL OR VENTRAL, WITHOUT HISTORY OF PRIOR REPAIR (Right)   1 Day Post-Op      Medications:  Continuous Infusions:   lactated ringers 100 mL/hr at 10/16/18 0105     Scheduled Meds:   acetaminophen  1,000 mg Intravenous Q8H    enoxaparin  40 mg Subcutaneous Daily    ibuprofen  600 mg Oral TID    levothyroxine  50 mcg Oral Daily    mupirocin  1 g Nasal BID    pravastatin  80 mg Oral Daily     PRN Meds:   HYDROcodone-acetaminophen    HYDROmorphone    mupirocin    naloxone    ondansetron    sodium chloride 0.9%        Objective:     Vital Signs (Most Recent):  Temp: 97.6 °F (36.4 °C) (10/16/18 0714)  Pulse: 68 (10/16/18 0714)  Resp: 17 (10/16/18 0714)  BP: 110/70 (10/16/18 0714)  SpO2: 98 % (10/16/18 0714) Vital Signs (24h Range):  Temp:  [97.5 °F (36.4 °C)-98.2 °F (36.8 °C)] 97.6 °F (36.4 °C)  Pulse:  [60-92] 68  Resp:  [10-20] 17  SpO2:  [93 %-100 %] 98 %  BP: (109-151)/(55-98) 110/70     Intake/Output - Last 3 Shifts       10/14 0700 - 10/15 0659 10/15 0700 - 10/16 0659 10/16 0700 - 10/17 0659    P.O.  120     I.V. (mL/kg)  2500 (25.5)     Total Intake(mL/kg)  2620 (26.7)     Urine (mL/kg/hr)  475 (0.2) 104 (0.3)    Blood  100     Total Output  575 104    Net  +2045 -104                 Physical Exam   Constitutional: He is oriented to person, place, and time. He appears well-developed and well-nourished.   Eyes: Conjunctivae and EOM are normal.   Pulmonary/Chest: Effort normal. No respiratory distress.   Abdominal: Soft. He exhibits distension (mild). There is tenderness (expected post op).   Colostomy well budded, dark in appearence (Sophia aware)  No output.   Genitourinary: Rectal exam shows no mass and no tenderness.   Musculoskeletal: Normal range of motion.   Neurological: He is alert and  oriented to person, place, and time.   Skin: Skin is warm and dry.   Psychiatric: He has a normal mood and affect. His behavior is normal.     Significant Labs:  BMP (Last 3 Results):   Recent Labs   Lab  10/16/18   0459   GLU  113*   NA  139   K  4.6   CL  106   CO2  25   BUN  11   CREATININE  0.9   CALCIUM  8.9   MG  2.1     CBC (Last 3 Results):   Recent Labs   Lab  10/16/18   0459   WBC  10.80   RBC  4.12*   HGB  12.9*   HCT  37.0*   PLT  210   MCV  90   MCH  31.3*   MCHC  34.9     CMP (Last 3 Results):   Recent Labs   Lab  10/16/18   0459   GLU  113*   CALCIUM  8.9   NA  139   K  4.6   CO2  25   CL  106   BUN  11   CREATININE  0.9     CRP (Last 3 Results): No results for input(s): CRP in the last 168 hours.    Significant Diagnostics:  None

## 2018-10-16 NOTE — PLAN OF CARE
Problem: Patient Care Overview  Goal: Plan of Care Review  Outcome: Ongoing (interventions implemented as appropriate)  Plan of care reviewed, patient verbalized understanding. Vital signs are stable. AAOx4. PRN Lortab is being given for pain. Abdominal lap sites x 5 along with hernia incision are C/D/I. LLQ abdominal colostomy is intact with no output. Abdomen round but not distended. Coates catheter is intact with an adequate amount of clear yellow urine. SCD's are intact. Free from falls/injuries. No acute distress noted. Patient is sleeping in bed with call light within reach. Will continue to monitor.

## 2018-10-16 NOTE — NURSING TRANSFER
Nursing Transfer Note      10/15/2018     Transfer To: 1034    Transfer via stretcher    Transfer with room air    Transported by nurse    Medicines sent: yes    Chart send with patient: Yes    Notified: family

## 2018-10-17 PROBLEM — Z43.3 COLOSTOMY CARE: Status: ACTIVE | Noted: 2018-10-17

## 2018-10-17 LAB
ANION GAP SERPL CALC-SCNC: 6 MMOL/L
BASOPHILS # BLD AUTO: 0.02 K/UL
BASOPHILS NFR BLD: 0.3 %
BUN SERPL-MCNC: 13 MG/DL
CALCIUM SERPL-MCNC: 8.7 MG/DL
CHLORIDE SERPL-SCNC: 110 MMOL/L
CO2 SERPL-SCNC: 25 MMOL/L
CREAT SERPL-MCNC: 0.8 MG/DL
DIFFERENTIAL METHOD: ABNORMAL
EOSINOPHIL # BLD AUTO: 0 K/UL
EOSINOPHIL NFR BLD: 0 %
ERYTHROCYTE [DISTWIDTH] IN BLOOD BY AUTOMATED COUNT: 12 %
EST. GFR  (AFRICAN AMERICAN): >60 ML/MIN/1.73 M^2
EST. GFR  (NON AFRICAN AMERICAN): >60 ML/MIN/1.73 M^2
GLUCOSE SERPL-MCNC: 95 MG/DL
HCT VFR BLD AUTO: 35.4 %
HGB BLD-MCNC: 11.6 G/DL
IMM GRANULOCYTES # BLD AUTO: 0.01 K/UL
IMM GRANULOCYTES NFR BLD AUTO: 0.1 %
LYMPHOCYTES # BLD AUTO: 1.1 K/UL
LYMPHOCYTES NFR BLD: 15.6 %
MAGNESIUM SERPL-MCNC: 2.1 MG/DL
MCH RBC QN AUTO: 30.5 PG
MCHC RBC AUTO-ENTMCNC: 32.8 G/DL
MCV RBC AUTO: 93 FL
MONOCYTES # BLD AUTO: 1 K/UL
MONOCYTES NFR BLD: 13.8 %
NEUTROPHILS # BLD AUTO: 5.1 K/UL
NEUTROPHILS NFR BLD: 70.2 %
NRBC BLD-RTO: 0 /100 WBC
PHOSPHATE SERPL-MCNC: 2.4 MG/DL
PLATELET # BLD AUTO: 174 K/UL
PMV BLD AUTO: 10.1 FL
POTASSIUM SERPL-SCNC: 4.1 MMOL/L
RBC # BLD AUTO: 3.8 M/UL
SODIUM SERPL-SCNC: 141 MMOL/L
WBC # BLD AUTO: 7.2 K/UL

## 2018-10-17 PROCEDURE — 80048 BASIC METABOLIC PNL TOTAL CA: CPT

## 2018-10-17 PROCEDURE — 20600001 HC STEP DOWN PRIVATE ROOM

## 2018-10-17 PROCEDURE — 25000003 PHARM REV CODE 250: Performed by: STUDENT IN AN ORGANIZED HEALTH CARE EDUCATION/TRAINING PROGRAM

## 2018-10-17 PROCEDURE — 36415 COLL VENOUS BLD VENIPUNCTURE: CPT

## 2018-10-17 PROCEDURE — 63600175 PHARM REV CODE 636 W HCPCS: Performed by: STUDENT IN AN ORGANIZED HEALTH CARE EDUCATION/TRAINING PROGRAM

## 2018-10-17 PROCEDURE — 83735 ASSAY OF MAGNESIUM: CPT

## 2018-10-17 PROCEDURE — 85025 COMPLETE CBC W/AUTO DIFF WBC: CPT

## 2018-10-17 PROCEDURE — 84100 ASSAY OF PHOSPHORUS: CPT

## 2018-10-17 PROCEDURE — 27000192 HC POUCH, WOUND DRAINAGE COLLECTOR (ANY SIZE)

## 2018-10-17 RX ADMIN — HYDROCODONE BITARTRATE AND ACETAMINOPHEN 1 TABLET: 5; 325 TABLET ORAL at 03:10

## 2018-10-17 RX ADMIN — HYDROMORPHONE HYDROCHLORIDE 1 MG: 1 INJECTION, SOLUTION INTRAMUSCULAR; INTRAVENOUS; SUBCUTANEOUS at 05:10

## 2018-10-17 RX ADMIN — HYDROCODONE BITARTRATE AND ACETAMINOPHEN 1 TABLET: 5; 325 TABLET ORAL at 07:10

## 2018-10-17 RX ADMIN — IBUPROFEN 600 MG: 600 TABLET ORAL at 03:10

## 2018-10-17 RX ADMIN — LEVOTHYROXINE SODIUM 50 MCG: 25 TABLET ORAL at 05:10

## 2018-10-17 RX ADMIN — HYDROCODONE BITARTRATE AND ACETAMINOPHEN 1 TABLET: 5; 325 TABLET ORAL at 08:10

## 2018-10-17 RX ADMIN — HYDROMORPHONE HYDROCHLORIDE 1 MG: 1 INJECTION, SOLUTION INTRAMUSCULAR; INTRAVENOUS; SUBCUTANEOUS at 09:10

## 2018-10-17 RX ADMIN — SODIUM CHLORIDE, SODIUM LACTATE, POTASSIUM CHLORIDE, AND CALCIUM CHLORIDE: .6; .31; .03; .02 INJECTION, SOLUTION INTRAVENOUS at 10:10

## 2018-10-17 RX ADMIN — SODIUM CHLORIDE, SODIUM LACTATE, POTASSIUM CHLORIDE, AND CALCIUM CHLORIDE: .6; .31; .03; .02 INJECTION, SOLUTION INTRAVENOUS at 08:10

## 2018-10-17 RX ADMIN — ENOXAPARIN SODIUM 40 MG: 100 INJECTION SUBCUTANEOUS at 09:10

## 2018-10-17 RX ADMIN — IBUPROFEN 600 MG: 600 TABLET ORAL at 09:10

## 2018-10-17 RX ADMIN — PRAVASTATIN SODIUM 80 MG: 40 TABLET ORAL at 09:10

## 2018-10-17 RX ADMIN — HYDROCODONE BITARTRATE AND ACETAMINOPHEN 1 TABLET: 5; 325 TABLET ORAL at 01:10

## 2018-10-17 NOTE — PLAN OF CARE
Problem: Fall Risk (Adult)  Goal: Absence of Falls  Patient will demonstrate the desired outcomes by discharge/transition of care.  Outcome: Ongoing (interventions implemented as appropriate)  Plan of care reviewed, patient verbalized understanding. Vital signs are stable. AAOx4. PRN Lortab is being given for pain. Abdominal lap sites x 5 along with hernia incision are C/D/I. LLQ abdominal colostomy is intact with no output. Abdomen round but not distended. Patient is now voiding clear yellow urine in his urinal. SCD's are intact. Free from falls/injuries. No acute distress noted. Patient is up in bed with call light within reach. Will continue to monitor.

## 2018-10-18 VITALS
DIASTOLIC BLOOD PRESSURE: 91 MMHG | HEART RATE: 66 BPM | HEIGHT: 70 IN | BODY MASS INDEX: 30.92 KG/M2 | OXYGEN SATURATION: 96 % | SYSTOLIC BLOOD PRESSURE: 146 MMHG | WEIGHT: 216 LBS | RESPIRATION RATE: 16 BRPM | TEMPERATURE: 98 F

## 2018-10-18 PROBLEM — R15.9 FULL INCONTINENCE OF FECES: Status: RESOLVED | Noted: 2018-08-06 | Resolved: 2018-10-18

## 2018-10-18 PROBLEM — Z43.3 COLOSTOMY CARE: Status: RESOLVED | Noted: 2018-10-17 | Resolved: 2018-10-18

## 2018-10-18 PROBLEM — R15.9 FECAL INCONTINENCE: Status: RESOLVED | Noted: 2018-08-22 | Resolved: 2018-10-18

## 2018-10-18 PROBLEM — K59.9 BOWEL DYSFUNCTION: Status: RESOLVED | Noted: 2018-02-07 | Resolved: 2018-10-18

## 2018-10-18 PROBLEM — R15.9 COMPLETE FECAL INCONTINENCE: Status: RESOLVED | Noted: 2018-10-15 | Resolved: 2018-10-18

## 2018-10-18 LAB
ANION GAP SERPL CALC-SCNC: 7 MMOL/L
BASOPHILS # BLD AUTO: 0.02 K/UL
BASOPHILS NFR BLD: 0.2 %
BUN SERPL-MCNC: 10 MG/DL
CALCIUM SERPL-MCNC: 9.3 MG/DL
CHLORIDE SERPL-SCNC: 107 MMOL/L
CO2 SERPL-SCNC: 26 MMOL/L
CREAT SERPL-MCNC: 0.8 MG/DL
DIFFERENTIAL METHOD: ABNORMAL
EOSINOPHIL # BLD AUTO: 0 K/UL
EOSINOPHIL NFR BLD: 0 %
ERYTHROCYTE [DISTWIDTH] IN BLOOD BY AUTOMATED COUNT: 11.9 %
EST. GFR  (AFRICAN AMERICAN): >60 ML/MIN/1.73 M^2
EST. GFR  (NON AFRICAN AMERICAN): >60 ML/MIN/1.73 M^2
GLUCOSE SERPL-MCNC: 99 MG/DL
HCT VFR BLD AUTO: 36.9 %
HGB BLD-MCNC: 12.5 G/DL
IMM GRANULOCYTES # BLD AUTO: 0.01 K/UL
IMM GRANULOCYTES NFR BLD AUTO: 0.1 %
LYMPHOCYTES # BLD AUTO: 1.1 K/UL
LYMPHOCYTES NFR BLD: 13.1 %
MAGNESIUM SERPL-MCNC: 2.1 MG/DL
MCH RBC QN AUTO: 30.7 PG
MCHC RBC AUTO-ENTMCNC: 33.9 G/DL
MCV RBC AUTO: 91 FL
MONOCYTES # BLD AUTO: 1.2 K/UL
MONOCYTES NFR BLD: 14.7 %
NEUTROPHILS # BLD AUTO: 5.8 K/UL
NEUTROPHILS NFR BLD: 71.9 %
NRBC BLD-RTO: 0 /100 WBC
PHOSPHATE SERPL-MCNC: 3.5 MG/DL
PLATELET # BLD AUTO: 178 K/UL
PMV BLD AUTO: 9.9 FL
POTASSIUM SERPL-SCNC: 3.9 MMOL/L
RBC # BLD AUTO: 4.07 M/UL
SODIUM SERPL-SCNC: 140 MMOL/L
WBC # BLD AUTO: 8.1 K/UL

## 2018-10-18 PROCEDURE — 90471 IMMUNIZATION ADMIN: CPT | Performed by: COLON & RECTAL SURGERY

## 2018-10-18 PROCEDURE — 83735 ASSAY OF MAGNESIUM: CPT

## 2018-10-18 PROCEDURE — 25000003 PHARM REV CODE 250: Performed by: STUDENT IN AN ORGANIZED HEALTH CARE EDUCATION/TRAINING PROGRAM

## 2018-10-18 PROCEDURE — 36415 COLL VENOUS BLD VENIPUNCTURE: CPT

## 2018-10-18 PROCEDURE — 80048 BASIC METABOLIC PNL TOTAL CA: CPT

## 2018-10-18 PROCEDURE — 63600175 PHARM REV CODE 636 W HCPCS: Performed by: STUDENT IN AN ORGANIZED HEALTH CARE EDUCATION/TRAINING PROGRAM

## 2018-10-18 PROCEDURE — 85025 COMPLETE CBC W/AUTO DIFF WBC: CPT

## 2018-10-18 PROCEDURE — 63600175 PHARM REV CODE 636 W HCPCS: Performed by: COLON & RECTAL SURGERY

## 2018-10-18 PROCEDURE — 90686 IIV4 VACC NO PRSV 0.5 ML IM: CPT | Performed by: COLON & RECTAL SURGERY

## 2018-10-18 PROCEDURE — 84100 ASSAY OF PHOSPHORUS: CPT

## 2018-10-18 RX ORDER — HYDROCODONE BITARTRATE AND ACETAMINOPHEN 5; 325 MG/1; MG/1
1 TABLET ORAL EVERY 4 HOURS PRN
Qty: 30 TABLET | Refills: 0 | Status: SHIPPED | OUTPATIENT
Start: 2018-10-18 | End: 2018-11-01

## 2018-10-18 RX ADMIN — LEVOTHYROXINE SODIUM 50 MCG: 25 TABLET ORAL at 05:10

## 2018-10-18 RX ADMIN — INFLUENZA A VIRUS A/MICHIGAN/45/2015 X-275 (H1N1) ANTIGEN (FORMALDEHYDE INACTIVATED), INFLUENZA A VIRUS A/SINGAPORE/INFIMH-16-0019/2016 IVR-186 (H3N2) ANTIGEN (FORMALDEHYDE INACTIVATED), INFLUENZA B VIRUS B/PHUKET/3073/2013 ANTIGEN (FORMALDEHYDE INACTIVATED), AND INFLUENZA B VIRUS B/MARYLAND/15/2016 BX-69A ANTIGEN (FORMALDEHYDE INACTIVATED) 0.5 ML: 15; 15; 15; 15 INJECTION, SUSPENSION INTRAMUSCULAR at 11:10

## 2018-10-18 RX ADMIN — PRAVASTATIN SODIUM 80 MG: 40 TABLET ORAL at 08:10

## 2018-10-18 RX ADMIN — ENOXAPARIN SODIUM 40 MG: 100 INJECTION SUBCUTANEOUS at 09:10

## 2018-10-18 RX ADMIN — HYDROCODONE BITARTRATE AND ACETAMINOPHEN 1 TABLET: 5; 325 TABLET ORAL at 02:10

## 2018-10-18 RX ADMIN — HYDROCODONE BITARTRATE AND ACETAMINOPHEN 1 TABLET: 5; 325 TABLET ORAL at 11:10

## 2018-10-18 RX ADMIN — SODIUM CHLORIDE, SODIUM LACTATE, POTASSIUM CHLORIDE, AND CALCIUM CHLORIDE: .6; .31; .03; .02 INJECTION, SOLUTION INTRAVENOUS at 05:10

## 2018-10-18 RX ADMIN — HYDROCODONE BITARTRATE AND ACETAMINOPHEN 1 TABLET: 5; 325 TABLET ORAL at 06:10

## 2018-10-18 RX ADMIN — IBUPROFEN 600 MG: 600 TABLET ORAL at 09:10

## 2018-10-18 NOTE — PLAN OF CARE
Problem: Patient Care Overview  Goal: Plan of Care Review  Outcome: Ongoing (interventions implemented as appropriate)  Plan of care reviewed, patient verbalized understanding. Vital signs are stable. AAOx4. PRN Lortab and Dilaudid given for pain. Abdominal lap sites x 5 along with hernia incision are C/D/I. LLQ abdominal colostomy is intact with no output. Abdomen round and distended.Patient tolerating low fiber/low residue diet. Patient denies nausea/emesis.  Patient spontaneously voiding. SCD's are intact. Patient remains free from falls/injuries. No acute distress noted. Call light within reach. Will continue to monitor.

## 2018-10-18 NOTE — NURSING
Ostomy follow up. Patient seen for continued teaching. Sathish removed easily. The stoma is dark but red is visible. Small stool output seen.   Patient has had an ileostomy in the past. Patient is comfortable emptying bag and changing his pouch.       Removed patients ostomy pouch and discussed stoma and lisy-stomal care.  Demonstrated cutting pouch to fit stoma allowing 1/8 inch clearance.   Pt verbalized understanding of when to alert MD of issues with stoma.  .       All questions answered and start kits sent from [facility].   Patient to follow up with Kemi RIVAS For outpatient ostomy care. Patient given my phone number to call with any questions.   Patient discharging home today.     Carlee Baron RN CN [facility]   [phone]

## 2018-10-18 NOTE — HPI
"  5/17 LAR with DLI for colon cancer by Dr Dominguez. 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 He underwent stage 1 interstim on 8/6.   He has had no improvement in his symptoms and had the interstim removed. He has "no quality of life." Has clustering and up to 15 bowel movements daily. Occasional incontinence to liquid stool and regularly to gas.      "

## 2018-10-18 NOTE — DISCHARGE SUMMARY
"[facility]  Colorectal Surgery  Discharge Summary      Patient Name: Murphy Angel  MRN: [id number]  Admission Date: 10/15/2018  Hospital Length of Stay: 3 days  Discharge Date and Time: 10/18/2018 12:25 PM  Attending Physician: No att. providers found   Discharging Provider: Siria Schulz NP  Primary Care Provider: Chandan Vallejo MD     HPI:    5/17 LAR with DLI for colon cancer by Dr Dominguez. 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 He underwent stage 1 interstim on 8/6.   He has had no improvement in his symptoms and had the interstim removed. He has "no quality of life." Has clustering and up to 15 bowel movements daily. Occasional incontinence to liquid stool and regularly to gas.        Procedure(s) (LRB):  CREATION, COLOSTOMY, LAPAROSCOPIC (N/A)  REPAIR, HERNIA, INCISIONAL OR VENTRAL, WITHOUT HISTORY OF PRIOR REPAIR (Right)     Hospital Course:  No notes on file  The patient underwent the above operation. He had an expected post operative course. He was discharged in stable condition. Tolerating diet, pain controlled, ambulatory, ostomy functioning. He will follow up with Dr santos in 2 weeks       Significant Diagnostic Studies: Labs:   BMP:   Recent Labs   Lab 10/17/18  0511 10/18/18  0529   GLU 95 99    140   K 4.1 3.9    107   CO2 25 26   BUN 13 10   CREATININE 0.8 0.8   CALCIUM 8.7 9.3   MG 2.1 2.1   , CMP   Recent Labs   Lab 10/17/18  0511 10/18/18  0529    140   K 4.1 3.9    107   CO2 25 26   GLU 95 99   BUN 13 10   CREATININE 0.8 0.8   CALCIUM 8.7 9.3   ANIONGAP 6* 7*   ESTGFRAFRICA >60.0 >60.0   EGFRNONAA >60.0 >60.0    and CBC   Recent Labs   Lab 10/17/18  0511 10/18/18  0529   WBC 7.20 8.10   HGB 11.6* 12.5*   HCT 35.4* 36.9*    178       Pending Diagnostic Studies:     None        Final Active Diagnoses:    Diagnosis Date Noted POA    Colostomy in place [Z93.3] 10/16/2018 Not Applicable    History of rectal cancer " [Z85.048] 05/31/2017 Yes      Problems Resolved During this Admission:    Diagnosis Date Noted Date Resolved POA    PRINCIPAL PROBLEM:  Complete fecal incontinence [R15.9] 10/15/2018 10/18/2018 Yes    Colostomy care [Z43.3] 10/17/2018 10/18/2018 Not Applicable    Fecal incontinence [R15.9] 08/22/2018 10/18/2018 Yes    Full incontinence of feces [R15.9] 08/06/2018 10/18/2018 Yes      Discharged Condition: good    Disposition: Home or Self Care    Follow Up:  Follow-up Information     Nacho Cortes MD In 2 weeks.    Specialty:  Colon and Rectal Surgery  Contact information:  [address] [address]  [phone]             JOSE Grant In 2 weeks.    Specialties:  Colon and Rectal Surgery, Wound Care  Contact information:  [address] [address]  [phone]                 Patient Instructions:      Diet Adult Regular     Lifting restrictions   Order Comments: No heavy lifting over 10lbs     Notify your health care provider if you experience any of the following:  temperature >100.4     Notify your health care provider if you experience any of the following:  persistent nausea and vomiting or diarrhea     Notify your health care provider if you experience any of the following:  severe uncontrolled pain     Notify your health care provider if you experience any of the following:  redness, tenderness, or signs of infection (pain, swelling, redness, odor or green/yellow discharge around incision site)     Medications:  Reconciled Home Medications:      Medication List      START taking these medications    HYDROcodone-acetaminophen 5-325 mg per tablet  Commonly known as:  NORCO  Take 1 tablet by mouth every 4 (four) hours as needed.        CONTINUE taking these medications    DULCOLAX (BISACODYL) ORAL  Take by mouth.     levothyroxine 50 MCG tablet  Commonly known as:  SYNTHROID  Take 1 tablet by mouth once daily.     pravastatin 80 MG tablet  Commonly known as:   PRAVACHOL  Take 1 tablet by mouth once daily.        STOP taking these medications    diphenoxylate-atropine 2.5-0.025 mg 2.5-0.025 mg per tablet  Commonly known as:  LOMOTIL     metroNIDAZOLE 500 MG tablet  Commonly known as:  FLAGYL     morphine 10 mg/5 mL solution     neomycin 500 mg Tab  Commonly known as:  MYCIFRADIN     oxyCODONE-acetaminophen 5-325 mg per tablet  Commonly known as:  PERCOCET     polyethylene glycol 17 gram/dose powder  Commonly known as:  GLYCOLAX            Siria Schulz NP  Colorectal Surgery  [facility]

## 2018-10-18 NOTE — PROGRESS NOTES
Patient had a small amount of bright red blood discharge from his rectum.  Paged on-call physician. Dr. Olivier went to patient's bedside, examined discharge. No further orders. JJ

## 2018-10-18 NOTE — PROGRESS NOTES
[facility]  Colorectal Surgery  Progress Note    Patient Name: Murphy Angel  MRN: [id number]  Admission Date: 10/15/2018  Hospital Length of Stay: 3 days  Attending Physician: Nacho Cortes MD    Subjective:     Interval History: tolerating diet, up out of bed walking, small amount of ostomy OP    Post-Op Info:  Procedure(s) (LRB):  CREATION, COLOSTOMY, LAPAROSCOPIC (N/A)  REPAIR, HERNIA, INCISIONAL OR VENTRAL, WITHOUT HISTORY OF PRIOR REPAIR (Right)   3 Days Post-Op      Medications:  Continuous Infusions:  Scheduled Meds:   enoxaparin  40 mg Subcutaneous Daily    ibuprofen  600 mg Oral TID    levothyroxine  50 mcg Oral Daily    mupirocin  1 g Nasal BID    pravastatin  80 mg Oral Daily     PRN Meds:   HYDROcodone-acetaminophen    HYDROmorphone    influenza    mupirocin    naloxone    ondansetron    sodium chloride 0.9%        Objective:     Vital Signs (Most Recent):  Temp: 98.2 °F (36.8 °C) (10/18/18 0800)  Pulse: 66 (10/18/18 0800)  Resp: 16 (10/18/18 0800)  BP: (!) 146/91 (10/18/18 0800)  SpO2: 96 % (10/18/18 0906) Vital Signs (24h Range):  Temp:  [97.4 °F (36.3 °C)-98.8 °F (37.1 °C)] 98.2 °F (36.8 °C)  Pulse:  [66-85] 66  Resp:  [16-20] 16  SpO2:  [94 %-96 %] 96 %  BP: (121-153)/(76-91) 146/91     Intake/Output - Last 3 Shifts       10/16 0700 - 10/17 0659 10/17 0700 - 10/18 0659 10/18 0700 - 10/19 0659    P.O. 360 480     I.V. (mL/kg) 1600 (16.3) 2448.3 (25)     Total Intake(mL/kg) 1960 (20) 2928.3 (29.9)     Urine (mL/kg/hr) 2104 (0.9) 4175 (1.8) 400 (1)    Emesis/NG output 0      Stool 20 20     Blood       Total Output 2124 4195 400    Net -164 -1266.7 -400           Urine Occurrence 3 x      Stool Occurrence 0 x      Emesis Occurrence 0 x            Physical Exam   Constitutional: He is oriented to person, place, and time. No distress.   Neck: Neck supple.   Cardiovascular: Normal rate and regular rhythm.   Pulmonary/Chest: Effort normal. No respiratory distress.    Abdominal: Soft.   Ostomy in place, dusky but having small amount of liquid OP, mild TPP   Musculoskeletal: Normal range of motion.   Neurological: He is alert and oriented to person, place, and time.   Skin: Skin is warm and dry.         Significant Labs:  BMP (Last 3 Results):   Recent Labs   Lab 10/16/18  0459 10/17/18  0511 10/18/18  0529   * 95 99    141 140   K 4.6 4.1 3.9    110 107   CO2 25 25 26   BUN 11 13 10   CREATININE 0.9 0.8 0.8   CALCIUM 8.9 8.7 9.3   MG 2.1 2.1 2.1     CBC (Last 3 Results):   Recent Labs   Lab 10/16/18  0459 10/17/18  0511 10/18/18  0529   WBC 10.80 7.20 8.10   RBC 4.12* 3.80* 4.07*   HGB 12.9* 11.6* 12.5*   HCT 37.0* 35.4* 36.9*    174 178   MCV 90 93 91   MCH 31.3* 30.5 30.7   MCHC 34.9 32.8 33.9       Significant Diagnostics:  None    Assessment/Plan:     * Complete fecal incontinence    POD 3 Colostomy     Cont reg diet  Ostomy teaching, plan to remove bar today  OOB  dispo planning           Charles Acosta MD  Colorectal Surgery  [facility]  Have seen and examined the patient with the fellow and agree with their plan.  GREGG RANDALL

## 2018-10-18 NOTE — PLAN OF CARE
SW following for d/c needs. Pt refusing HH services at this time.           Jose Alvarez, MSW, [facility]  [facility]  [id number]

## 2018-10-18 NOTE — PLAN OF CARE
Problem: Patient Care Overview  Goal: Plan of Care Review  Outcome: Ongoing (interventions implemented as appropriate)  Plan of care reviewed with patient, verbalizes understanding. Ambulated independently in rosenberg x2, ostomy pouch changed by wound care nurse. Continues to have small amount of liquid output. Remains on IV fluids, large amount of urine output. Cont. To have pain 7-8/10. Well managed with current regimen. Tolerates diet well. In Bed without distress, call light within reach.

## 2018-10-18 NOTE — PLAN OF CARE
PCP: Chandan Vallejo MD    Pharmacy:   [facility] - [place] - [address]  [address] [place] [address]  Phone: [phone] Fax: [phone]    Payor: [facility] / Plan: [facility] CHOICE PLUS / Product Type: Commercial /        10/16/18 1515   Discharge Assessment   Assessment Type Discharge Planning Assessment   Confirmed/corrected address and phone number on facesheet? Yes   Assessment information obtained from? Patient   Communicated expected length of stay with patient/caregiver yes   Prior to hospitilization cognitive status: Alert/Oriented   Prior to hospitalization functional status: Independent   Current cognitive status: Alert/Oriented   Current Functional Status: Independent   Lives With spouse   Able to Return to Prior Arrangements yes   Is patient able to care for self after discharge? Yes   Patient's perception of discharge disposition home or selfcare   Readmission Within The Last 30 Days no previous admission in last 30 days   Patient currently being followed by outpatient case management? No   Patient currently receives any other outside agency services? No   Equipment Currently Used at Home CPAP   Do you have any problems affording any of your prescribed medications? No   Is the patient taking medications as prescribed? yes   Does the patient have transportation home? Yes   Transportation Available family or friend will provide   Dialysis Name and Scheduled days n/a   Does the patient receive services at the Coumadin Clinic? No   Discharge Plan A Home;Home with family   Discharge Plan B Home;Home with family;Home Health   Patient/Family In Agreement With Plan yes

## 2018-10-18 NOTE — PLAN OF CARE
CM met with patient to discuss discharge plan to home with Home Health services. Patient in agreement with discharge plan to home. Patient reports he does not want to receive Home Health services at this time. Patient states he is comfortable with caring for the ostomy independently and his wife will assist as needed. CM informed patient of follow up appt with Dr Cortes and JOSE Mcmullen, WOCN, pt verbalized understanding. Patient's family will provide transportation home.    Future Appointments   Date Time Provider Department Center   11/1/2018 10:15 AM JOSE Grant [facility] ENTERO [facility]   11/1/2018 10:15 AM Nacho Cortes MD [facility] COLON [facility]        10/18/18 1222   Final Note   Assessment Type Final Discharge Note   What phone number can be called within the next 1-3 days to see how you are doing after discharge? [phone]   Hospital Follow Up  Appt(s) scheduled? Yes   Discharge plans and expectations educations in teach back method with documentation complete? Yes

## 2018-11-01 ENCOUNTER — OFFICE VISIT (OUTPATIENT)
Dept: SURGERY | Facility: CLINIC | Age: 58
End: 2018-11-01
Payer: COMMERCIAL

## 2018-11-01 ENCOUNTER — OFFICE VISIT (OUTPATIENT)
Dept: WOUND CARE | Facility: CLINIC | Age: 58
End: 2018-11-01
Payer: COMMERCIAL

## 2018-11-01 VITALS
HEIGHT: 70 IN | BODY MASS INDEX: 30.58 KG/M2 | SYSTOLIC BLOOD PRESSURE: 138 MMHG | WEIGHT: 213.63 LBS | DIASTOLIC BLOOD PRESSURE: 89 MMHG | HEART RATE: 61 BPM

## 2018-11-01 DIAGNOSIS — K94.19 IRRITANT CONTACT DERMATITIS DUE TO ILEOSTOMY: ICD-10-CM

## 2018-11-01 DIAGNOSIS — Z43.3 ATTENTION TO COLOSTOMY: Primary | ICD-10-CM

## 2018-11-01 DIAGNOSIS — Z98.890 POSTOPERATIVE STATE: Primary | ICD-10-CM

## 2018-11-01 DIAGNOSIS — Z71.89 ENCOUNTER FOR OSTOMY CARE EDUCATION: ICD-10-CM

## 2018-11-01 DIAGNOSIS — L24.9 IRRITANT CONTACT DERMATITIS DUE TO ILEOSTOMY: ICD-10-CM

## 2018-11-01 PROCEDURE — 99999 PR PBB SHADOW E&M-EST. PATIENT-LVL III: CPT | Mod: PBBFAC,,, | Performed by: NURSE PRACTITIONER

## 2018-11-01 PROCEDURE — 99024 POSTOP FOLLOW-UP VISIT: CPT | Mod: S$GLB,,, | Performed by: NURSE PRACTITIONER

## 2018-11-01 PROCEDURE — 99024 POSTOP FOLLOW-UP VISIT: CPT | Mod: S$GLB,,, | Performed by: CLINICAL NURSE SPECIALIST

## 2018-11-01 PROCEDURE — 99999 PR PBB SHADOW E&M-EST. PATIENT-LVL II: CPT | Mod: PBBFAC,,, | Performed by: CLINICAL NURSE SPECIALIST

## 2018-11-01 RX ORDER — HYDROCODONE BITARTRATE AND ACETAMINOPHEN 5; 325 MG/1; MG/1
1-2 TABLET ORAL
Qty: 31 TABLET | Refills: 0 | Status: SHIPPED | OUTPATIENT
Start: 2018-11-01 | End: 2019-01-08

## 2018-11-01 RX ORDER — HYDROCODONE BITARTRATE AND ACETAMINOPHEN 5; 325 MG/1; MG/1
1-2 TABLET ORAL
Qty: 31 TABLET | Refills: 0 | Status: SHIPPED | OUTPATIENT
Start: 2018-11-01 | End: 2018-11-01

## 2018-11-01 NOTE — PROGRESS NOTES
This patient is known to me and is here in clinic today for first post op evaluation related to colostomy. Surgery done 2 weeks ago by Dr. Cortes. The patient reports some problems with  New permanent ostomy. The patient is not receiving home health care.   Pain level today is reported as  2 but is skin pain from ostomy leaks    The colostomy is 32mm octaviano retracted lower part of loop  stoma.  The patient is currently  wearing a 1piece pouching system by [employer].   Average wear time is 1 days.   Peristomal skin is scalded    There is  mucocutaneous separation and necrotic mucosa hanging from base that I cut away.  Pt is coping well with the new ostomy.  SUPPLIES/DME: sent to [employer] today  Patient enrolled in [employer] [facility] Program. Pt gives verbal permission and understanding a representative will call them regarding samples sent and follow up needs.  Samples requested today based on needs or patient requests today.    Pouching concerns include:    Necrotic tissue cut away        Loop stoma has lower part sunken in and this will heal flush at best . NEEDS CONVEXITY to help it heal in upright position       Patient instructions for pouching:  Cleanse skin with water, dry well.  Apply no sting skin barrier film . Allow to dry    Apply  Convex  pouch sized appropriately for stoma. Reviewed sizing of new stoma  If he decided to used closed end pouches, later, he may be fine with flat once stoma edge is healed   FU IN 2 WEEKS  SPECIAL NEEDS:  Pt counseled on skin care, nutrition, hydration as well as  how to order ostomy supplies.  I spent greater than 50% of this 45 minute visit in face to face counseling.

## 2018-11-01 NOTE — PROGRESS NOTES
Subjective:       Patient ID: Murphy Angel is a 58 y.o. male who is here for first po visit after having colostomy placed, had LAR done in 2017, he developed fecal incontinence after closure.  Doing well post op.  Stomas was dusky, (see Kemi Soto note), soft brown stool, eating well, good energy level, family wedding next week     Chief Complaint:  Post op  HPI  Review of Systems   Constitutional: Negative for activity change, appetite change, chills, fatigue and fever.   Respiratory: Negative for cough, chest tightness, shortness of breath and wheezing.    Cardiovascular: Negative for chest pain and leg swelling.   Gastrointestinal: Negative for abdominal distention, abdominal pain, anal bleeding, blood in stool, constipation, diarrhea, nausea, rectal pain and vomiting.   Genitourinary: Negative for difficulty urinating, enuresis, flank pain, frequency, genital sores and hematuria.   Musculoskeletal: Negative for back pain, gait problem and joint swelling.   Skin: Negative.  Negative for color change.   Neurological: Negative for dizziness, syncope and numbness.   Psychiatric/Behavioral: Negative for agitation, confusion, decreased concentration, dysphoric mood and hallucinations. The patient is not nervous/anxious and is not hyperactive.        Objective:      Physical Exam   Constitutional: He is oriented to person, place, and time. He appears well-developed and well-nourished. No distress.   Cardiovascular: Normal rate, regular rhythm and normal heart sounds.   Pulmonary/Chest: Effort normal and breath sounds normal. No respiratory distress. He has no wheezes. He has no rales.   Abdominal: Soft. He exhibits no distension and no mass. There is no tenderness. There is no guarding.   abd inc line healing well  Colostomy functional   Musculoskeletal: Normal range of motion.   Neurological: He is alert and oriented to person, place, and time.   Skin: Skin is warm and dry.   Psychiatric: He has a normal mood and  affect. His behavior is normal. Judgment and thought content normal.   Nursing note and vitals reviewed.      Assessment:         post op  Plan:         fu with Kemi 2 weeks  Fu 2-3 months  Call office for any difficulties

## 2018-11-01 NOTE — LETTER
November 1, 2018      Chandan Vallejo MD  [address]  [place] [address]           [facility]  [address]  [facility] [address]  Phone: [phone]          Patient: Murphy Angel   MR Number: [id number]   YOB: 1960   Date of Visit: 11/1/2018       Dear Dr. Chandan Vallejo:    Thank you for referring Murphy Angel to me for evaluation. Attached you will find relevant portions of my assessment and plan of care.    If you have questions, please do not hesitate to call me. I look forward to following Murphy Angel along with you.    Sincerely,    Kemi Soto, CNS    Enclosure  CC:  No Recipients    If you would like to receive this communication electronically, please contact [email or web address] or [phone] to request more information on [employer] access.    For providers and/or their staff who would like to refer a patient to [facility], please contact us through our one-stop-shop provider referral line, [facility], at [phone].    If you feel you have received this communication in error or would no longer like to receive these types of communications, please e-mail [email or web address]

## 2018-11-15 ENCOUNTER — OFFICE VISIT (OUTPATIENT)
Dept: WOUND CARE | Facility: CLINIC | Age: 58
End: 2018-11-15
Payer: COMMERCIAL

## 2018-11-15 DIAGNOSIS — Z43.3 ATTENTION TO COLOSTOMY: Primary | ICD-10-CM

## 2018-11-15 DIAGNOSIS — L24.9 IRRITANT CONTACT DERMATITIS DUE TO ILEOSTOMY: ICD-10-CM

## 2018-11-15 DIAGNOSIS — K94.19 IRRITANT CONTACT DERMATITIS DUE TO ILEOSTOMY: ICD-10-CM

## 2018-11-15 PROCEDURE — 99999 PR PBB SHADOW E&M-EST. PATIENT-LVL II: CPT | Mod: PBBFAC,,, | Performed by: CLINICAL NURSE SPECIALIST

## 2018-11-15 PROCEDURE — 99024 POSTOP FOLLOW-UP VISIT: CPT | Mod: S$GLB,,, | Performed by: CLINICAL NURSE SPECIALIST

## 2018-11-15 NOTE — PROGRESS NOTES
"This is one month fu post colostomy and here for eval of stoma and skin, the separation noted on last visit has healed 80% and stoma is now smaller, down to 1 " , peristomal skin is red and irritated mainly because the pch he is wearing is precut and not too large      Os remains in low position but doing ok with this as stool formed much of time,  Likes the light nayeli convex, gave him product number for next order of 1"  72979 and to add 2 mm ring for now to help protect skin as he has 2 boxes of pre cut     Return after [place] to eval for return to work,   Using [employer] for DME  Wife is still going care at this point but he knows he has to do his own very soon  Discussed use of hernia prevention and abd support.  Héctor lew made.   "

## 2019-01-08 ENCOUNTER — OFFICE VISIT (OUTPATIENT)
Dept: WOUND CARE | Facility: CLINIC | Age: 59
End: 2019-01-08
Payer: COMMERCIAL

## 2019-01-08 VITALS
SYSTOLIC BLOOD PRESSURE: 136 MMHG | WEIGHT: 224.19 LBS | DIASTOLIC BLOOD PRESSURE: 82 MMHG | HEIGHT: 70 IN | BODY MASS INDEX: 32.1 KG/M2 | HEART RATE: 76 BPM

## 2019-01-08 DIAGNOSIS — Z43.3 ATTENTION TO COLOSTOMY: Primary | ICD-10-CM

## 2019-01-08 PROCEDURE — 99024 POSTOP FOLLOW-UP VISIT: CPT | Mod: S$GLB,,, | Performed by: CLINICAL NURSE SPECIALIST

## 2019-01-08 PROCEDURE — 99024 PR POST-OP FOLLOW-UP VISIT: ICD-10-PCS | Mod: S$GLB,,, | Performed by: CLINICAL NURSE SPECIALIST

## 2019-01-08 PROCEDURE — 99999 PR PBB SHADOW E&M-EST. PATIENT-LVL II: ICD-10-PCS | Mod: PBBFAC,,, | Performed by: CLINICAL NURSE SPECIALIST

## 2019-01-08 PROCEDURE — 99999 PR PBB SHADOW E&M-EST. PATIENT-LVL II: CPT | Mod: PBBFAC,,, | Performed by: CLINICAL NURSE SPECIALIST

## 2019-01-08 NOTE — PROGRESS NOTES
This is three month fu post colostomy and here for eval of stoma and skin, the pt is doing well with pouching and using deep convexity and ring, his wife does all her applications but I have encouraged him to learn this as he is going back to work     2018      Today 1/8/19      Pt wants to return to work in Feb, letter given   Needs to be independent , encouraged this   Continue deep convex can go with out belt as this bothers his back     Discussed use of hernia prevention and abd support.  FU 6-9 months

## 2019-09-24 ENCOUNTER — OFFICE VISIT (OUTPATIENT)
Dept: WOUND CARE | Facility: CLINIC | Age: 59
End: 2019-09-24
Payer: COMMERCIAL

## 2019-09-24 DIAGNOSIS — Z43.3 ATTENTION TO COLOSTOMY: Primary | ICD-10-CM

## 2019-09-24 PROCEDURE — 99213 PR OFFICE/OUTPT VISIT, EST, LEVL III, 20-29 MIN: ICD-10-PCS | Mod: S$GLB,,, | Performed by: CLINICAL NURSE SPECIALIST

## 2019-09-24 PROCEDURE — 99999 PR PBB SHADOW E&M-EST. PATIENT-LVL II: CPT | Mod: PBBFAC,,, | Performed by: CLINICAL NURSE SPECIALIST

## 2019-09-24 PROCEDURE — 99213 OFFICE O/P EST LOW 20 MIN: CPT | Mod: S$GLB,,, | Performed by: CLINICAL NURSE SPECIALIST

## 2019-09-24 PROCEDURE — 99999 PR PBB SHADOW E&M-EST. PATIENT-LVL II: ICD-10-PCS | Mod: PBBFAC,,, | Performed by: CLINICAL NURSE SPECIALIST

## 2019-09-24 NOTE — PROGRESS NOTES
Subjective:       Patient ID: Murphy Angel is a 59 y.o. male.    Chief Complaint: Colostomy    This is a fu post one year colostomy done for incontinence. Pt doing well, happy with stoma and living with ostomy, Quality of life improved,  HIs wife still does his ostomy care but he knows how to do and can do if necessary. He works and is active.    Review of Systems   Constitutional: Negative for chills and fever.   Respiratory: Negative for cough and shortness of breath.    Cardiovascular: Negative for chest pain and leg swelling.   Gastrointestinal: Negative for abdominal distention and constipation.   Genitourinary: Negative for dysuria and hematuria.   Musculoskeletal: Negative for arthralgias and back pain.   Skin: Negative for rash and wound.   Neurological: Positive for light-headedness. Negative for weakness and headaches.        Lightheaded /dizzy very intermittent and this was discussed with PCP       Objective:      Physical Exam   Constitutional: He is oriented to person, place, and time. He appears well-developed and well-nourished.   Pulmonary/Chest: Effort normal. No respiratory distress. He has no wheezes.   Abdominal: Soft. Bowel sounds are normal. He exhibits no distension.   Musculoskeletal: Normal range of motion. He exhibits no edema.   Neurological: He is alert and oriented to person, place, and time.   Skin: Skin is warm and dry. No erythema.   Psychiatric: He has a normal mood and affect.       9/24 he wears deep convex GRICELDA and belt, with ring, I have suggested he either stop ring or switch to light convex and see how this does. Wears pouch 3-4 days , very rare leaks,   He uses [employer] for DME   No issues with supplies  Discussed hernia prevention measures and binders   Assessment:       1. Attention to colostomy        Plan:       No significant changes to plan of care for colostomy  Hernia prevention and support STRONGLY recommended  FU annually   I have reviewed the plan of care with the  patient and/ wife and they express understanding. I spent over 50% of this 15 minute visit in face to face counseling.

## 2021-02-16 NOTE — PROGRESS NOTES
Gabapentin 600 mg BID ordered for patient which is home dose. This medication is renally eliminated. Patient with ALIS on CKD. Will change to gabapentin 400 BID per renal dose adjustment policy. Estimated Creatinine Clearance: 36 mL/min (A) (based on SCr of 2 mg/dL (H)). Pharmacy will continue to monitor renal function and adjust dose as necessary. Please call with any questions.     Thanks  Lamin Baez RPh 2/15/2021, 11:28 PM Patient ID:  Murphy Angel is a 57 y.o. male     Chief Complaint:   Chief Complaint   Patient presents with    Follow-up    Rectal Cancer        HPI: Completed chemo with variable bowels. Small amountg of Fiber and Miralax in AM. Spending most of the day in bathroom small pasty stool takes Ducolax. No results with Imodium or Lomotil. Was started on MS elixir    Doing better with MS. Stools still soft and pasty     6/23/17 ileostomy closure.    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 Neg scans has seen XRT in [facility].   CT 2/21/17 No evidence of mets mass not seen. MRI scheduled. Saw Dr Camacho who recommended APR. Came to [facility] for second opinion Feb 15, 2017.     ROS:        Constitutional: No fever, chills, activity or appetite change.      HENT: No hearing loss, facial swelling, neck pain or stiffness.       Eyes: No discharge, itching and visual disturbance.      Respiratory: No apnea, cough, choking or shortness of breath.       Cardiovascular: No leg swelling or chest pain      Gastrointestinal: No abdominal distention or change in bowel habbits     Genitourinary: No dysuria, frequency or flank pain.      Musculoskeletal: No arthralgias or gait problem.      Neurological: No dizziness, seizures or weakness.      Hematological: No adenopathy.      Psychiatric/Behavioral: No hallucinations or behavioral problems.       PE:    APPEARANCE: Well nourished, well developed, in no acute distress.   CHEST: Lungs clear. Normal respiratory effort.  CARDIOVASCULAR: Normal rhythm. No edema.  ABDOMEN: Soft. No tenderness or masses.  Rectum:  Normal skin, NST, mild stenosis dilated to first digit  Anosciopy Grade 2 int hemorrhoids and mild proctitis  Musculoskeletal: Symmetric, normal range of motion and strength.   Neurological: Alert and oriented to person,  place, and time. Normal reflexes.   Skin: Skin is warm and dry.   Psychiatric: Normal mood and affect. Behavior is normal and appropriate.     PROCEDURE:  Anoscopy - Diagnostic - Internal Hemorrhoids    With informed consent IRC was applied at right anterior and left anterior positions.  The procedure was tolerated well.  The patient was given a handout which discussed their disease process, precautions, and instructions for follow-up and therapy.    IMP:  Internal Hemorrhoids, Symptomatic, LAR syndrome    IMP:   Hx rectal cancer,  Low anterior syndroms  PLAN: Bowel regimen.  COntinue Morphine elixir  Increase Fiber RTC 5 weeks

## 2024-02-26 NOTE — PATIENT INSTRUCTIONS
Fiber on a daily basis 2-3 capsules in morning (Metamuci, citrucil or Gummy Fiber) by mouth    Morralax 1/2 to 1 capful in AM    Imodium liquid (1 mg) daily in morning after 1st BM    MAy adjust after 5 days    Only take ducolax if no BM for 3 days  
DISPLAY PLAN FREE TEXT

## 2025-02-10 NOTE — DISCHARGE SUMMARY
[facility]  General Surgery  Discharge Summary      Patient Name: Murphy Angel  MRN: [id number]  Admission Date: 6/23/2017  Hospital Length of Stay: 2 days  Discharge Date and Time:  06/25/2017 12:42 PM  Attending Physician: Nacho Dominguez MD   Discharging Provider: Gabriela Booker MD  Primary Care Provider: Chandan Vallejo MD     HPI:     Mr. Angel is a 55 yo male with a history of rectal bleeding found to be due to low rectal cancer. 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 Neg scans has seen XRT in [facility]. Came to [facility] for second opinion Feb 15, 2017. Pouchagram 2/22/17 showed no leak. CT 2/21/17 showed no evidence of mets, mass not seen. The patient saw Dr. Camacho who recommended APR. He underwent LAR and DLI on 5/15/17. Pathology showed y pT3 pN0 pMX.       Procedure(s) (LRB):  CLOSURE-ILEOSTOMY (N/A)     Hospital Course: Mr. Angel underwent the above procedure on 6/23/2017 for treatment of no longer requiring an ileostomy. He tolerated the procedure well and his post operative course was uncomplicated. Prior to his discharge his pain was well controled, he tolerated his diet without nausea or vomiting, he ambulated, and experienced return of bowel function. He was discharged home in good condition on 6/25/2017.       Consults:  None    Exam:  General Appearance:    Alert, resting comfortably, no distress   HEENT:    PERRL, EOMI, MMM   Lungs:     Respirations unlabored, normal RR   Heart:    Regular rate, cap refill < 2s    Abdomen:     Soft, appropriately TTP, ND   Extremities:   No edema   Drains:    none   Wounds:   Midline incision C/d/i, prior ileostomy site with dressing         Significant Diagnostic Studies: None    Pending Diagnostic Studies:     None        Final Active Diagnoses:    Diagnosis Date Noted POA    PRINCIPAL PROBLEM:  LAR loop ileostomy 5/2017, s/p ileostomy closure 6/23 [Z93.2] 05/31/2017 Not Applicable       Problems Resolved During this Admission:    Diagnosis Date Noted Date Resolved POA      Discharged Condition: good    Disposition: Home or Self Care    Follow Up:  Follow-up Information     Nacho Dominguez MD In 2 weeks.    Specialty:  Colon and Rectal Surgery  Why:  Post-op  Contact information:  [address]  [facility] [address]  [phone]                 Patient Instructions:     Diet general   Order Comments: Low residue diet     Activity as tolerated   Order Comments: Do not drive while on pain medication.  Do not lift anything heavier than 10 lbs. for 6 weeks.  Shower after 48 hrs from surgery. Do not submerge incision for 6 weeks.     Call MD for:  temperature >100.4     Call MD for:  persistent nausea and vomiting or diarrhea     Call MD for:  severe uncontrolled pain     Call MD for:  redness, tenderness, or signs of infection (pain, swelling, redness, odor or green/yellow discharge around incision site)     Call MD for:  difficulty breathing or increased cough     Call MD for:  severe persistent headache     Call MD for:  worsening rash     Call MD for:  persistent dizziness, light-headedness, or visual disturbances     Call MD for:  increased confusion or weakness     No dressing needed   Order Comments: Shower after 48 hrs from surgery. Do not submerge incision for 6 weeks. May apply gauze if desired to keep clothing clean and change the gauze every day or more frequently.       Medications:  Reconciled Home Medications:   Current Discharge Medication List      START taking these medications    Details   oxycodone-acetaminophen (PERCOCET) 5-325 mg per tablet Take 1 tablet by mouth every 4 (four) hours as needed for Pain.  Qty: 41 tablet, Refills: 0      polyethylene glycol (GLYCOLAX) 17 gram/dose powder Take 17 g by mouth once daily.  Qty: 527 g, Refills: 0         CONTINUE these medications which have NOT CHANGED    Details   hydrocodone-acetaminophen 5-325mg (NORCO) 5-325 mg per tablet Take 1  tablet by mouth every 6 (six) hours as needed for Pain.  Qty: 90 tablet, Refills: 0      levothyroxine (SYNTHROID) 50 MCG tablet Take 1 tablet by mouth once daily.      pravastatin (PRAVACHOL) 80 MG tablet Take 1 tablet by mouth once daily.      capecitabine (XELODA) 500 MG Tab              Gabriela Booker MD  General Surgery  [facility]   Opt out

## (undated) DEVICE — POUCH OSTOMY 1PC 5/8 - 1 1/4

## (undated) DEVICE — SUT CTD VICRYL 3-0 VIL BR

## (undated) DEVICE — SUT MCRYL PLUS 4-0 PS2 27IN

## (undated) DEVICE — SEE MEDLINE ITEM 152622

## (undated) DEVICE — NDL BOX COUNTER

## (undated) DEVICE — SUT 2-0 NYLON D/A

## (undated) DEVICE — SUT CTD VICRYL BR CR/SH VIL

## (undated) DEVICE — SYR 50ML CATH TIP

## (undated) DEVICE — ELECTRODE REM PLYHSV RETURN 9

## (undated) DEVICE — SOL NS 1000CC

## (undated) DEVICE — TRAY CATH UM FOLEY SIL W 16FR

## (undated) DEVICE — COVER LIGHT HANDLE 80/CA

## (undated) DEVICE — ADHESIVE DERMABOND ADVANCED

## (undated) DEVICE — SUT 3-0 VICRYL SH CR/8 18

## (undated) DEVICE — STAPLER DST GREEN 45X4.8MM

## (undated) DEVICE — SEE MEDLINE ITEM 156902

## (undated) DEVICE — LEGGINGS 48X31 INCH

## (undated) DEVICE — SOL BETADINE 5%

## (undated) DEVICE — COVERS PROBE NR-48 STERILE

## (undated) DEVICE — SEE MEDLINE ITEM 146417

## (undated) DEVICE — SUT VICRYL PLUS 3-0 FS1 27

## (undated) DEVICE — SYR ONLY LUER LOCK 20CC

## (undated) DEVICE — Device

## (undated) DEVICE — SEE MEDLINE ITEM 154981

## (undated) DEVICE — SEE MEDLINE ITEM 146347

## (undated) DEVICE — NDL 20GX1-1/2IN IB

## (undated) DEVICE — APPLICATOR CHLORAPREP ORN 26ML

## (undated) DEVICE — SEE MEDLINE ITEM 157117

## (undated) DEVICE — CLIPPER BLADE MOD 4406 (CAREF)

## (undated) DEVICE — DRESSING TRANS 4X4 TEGADERM

## (undated) DEVICE — SUT 4/0 36IN ETHIBOND EXCE

## (undated) DEVICE — SPONGE DERMACEA 4X4IN 12PLY

## (undated) DEVICE — DRAPE INCISE IOBAN 2 23X33IN

## (undated) DEVICE — TRAY FOLEY 16FR INFECTION CONT

## (undated) DEVICE — LEAD INTRODUCER KIT

## (undated) DEVICE — SYR 30CC LUER LOCK

## (undated) DEVICE — SUT 0 54IN CHROMIC GUT

## (undated) DEVICE — LUBRICANT SURGILUBE 2 OZ

## (undated) DEVICE — DRAPE ABDOMINAL TIBURON 14X11

## (undated) DEVICE — SOL CLEARIFY VISUALIZATION LAP

## (undated) DEVICE — NDL HYPO A BEVEL 22X1 1/2

## (undated) DEVICE — SUT MONOCRYL 4-0 PS-1 UND

## (undated) DEVICE — SUT CTD VICRYL VIL BR SH 27

## (undated) DEVICE — SYS CLSR DERMABOND PRINEO 22CM

## (undated) DEVICE — TROCAR ENDOPATH XCEL 11X100MM

## (undated) DEVICE — TIP SUC SIGMOIDOSCOPE 18F 12IN

## (undated) DEVICE — SEE MEDLINE ITEM 157181

## (undated) DEVICE — CATH PEZZAR 34 FR

## (undated) DEVICE — SET IRR URLGY 2LINE UNIV SPIKE

## (undated) DEVICE — SEE MEDLINE ITEM 152487

## (undated) DEVICE — SEE MEDLINE ITEM 157144

## (undated) DEVICE — SEE L#95700

## (undated) DEVICE — SIGMOIDOSCOPE DISP 19X25 25/BX

## (undated) DEVICE — TRAY MINOR GEN SURG

## (undated) DEVICE — TAPE SILK 3IN

## (undated) DEVICE — SCREENER ISTIM EXT NEROSTIMLTR

## (undated) DEVICE — CUTTER PROXIMATE BLUE 75MM

## (undated) DEVICE — CABLE TEST MINI J HOOK

## (undated) DEVICE — CLOSURE SKIN STERI STRIP 1/8X3

## (undated) DEVICE — SUT 0 VICRYL / UR6 (J603)

## (undated) DEVICE — CABLE TWIST LOCK 64CM

## (undated) DEVICE — SUT 1 36IN PDS II VIO MONO

## (undated) DEVICE — SWAB PROCTO 16 X 1 1/2 ST 2/PK

## (undated) DEVICE — SUT CTD VICRYL 2-0 VIL BR

## (undated) DEVICE — SOL WATER STRL IRR 1000ML

## (undated) DEVICE — CONNECTOR Y 3/8X3/8X3/8

## (undated) DEVICE — SUT 2-0 VICRYL / SH (J417)

## (undated) DEVICE — TUBING HF INSUFFLATION W/ FLTR

## (undated) DEVICE — SUT PDS II 1 TP-1 VIL

## (undated) DEVICE — NDL 22GA X1 1/2 REG BEVEL

## (undated) DEVICE — TAPE UMBILICAL 1/8X36IN WHITE

## (undated) DEVICE — DRESSING ADH ISLAND 3.6 X 14